# Patient Record
Sex: MALE | Race: WHITE | Employment: FULL TIME | URBAN - METROPOLITAN AREA
[De-identification: names, ages, dates, MRNs, and addresses within clinical notes are randomized per-mention and may not be internally consistent; named-entity substitution may affect disease eponyms.]

---

## 2017-01-10 ENCOUNTER — OFFICE VISIT (OUTPATIENT)
Dept: CARDIOLOGY CLINIC | Age: 57
End: 2017-01-10

## 2017-01-10 VITALS
BODY MASS INDEX: 34.27 KG/M2 | DIASTOLIC BLOOD PRESSURE: 70 MMHG | HEIGHT: 72 IN | HEART RATE: 79 BPM | WEIGHT: 253 LBS | SYSTOLIC BLOOD PRESSURE: 134 MMHG | OXYGEN SATURATION: 98 %

## 2017-01-10 DIAGNOSIS — R07.89 OTHER CHEST PAIN: Primary | ICD-10-CM

## 2017-01-10 NOTE — PROGRESS NOTES
1. Have you been to the ER, urgent care clinic since your last visit? Hospitalized since your last visit? No    2. Have you seen or consulted any other health care providers outside of the [address] [place] [address] since your last visit? Include any pap smears or colon screening.  No

## 2017-01-10 NOTE — PROGRESS NOTES
Mr. Ariana Fink is a pleasant 60year old male with history of coronary artery disease, status post LAD and OM branch stent in April, 2016, aortic aneurysm, mitral regurgitation, dyslipidemia and hypertension. Mr. Ariana Fink is here today to establish care with me. He used to be a patient of Dr. Vega Flores. Mr. Ariana Fink has a known history of coronary artery disease, mitral regurgitation, aortic aneurysm as mentioned above. He said that he is doing quite well since last time. He continues to have occasional dyspnea on exertion, which has been stable over the last couple years. There is no change in his cardiac status since last visit, however he said that occasionally he continues to feel chest tightness, lasting sometimes for a few seconds, sometimes a few minutes and sometimes even for one or two days. He has been having this discomfort for a couple years. There is no associated nausea or shortness of breath. There is no associated presyncope or syncope. He takes his medications regularly. Past Medical History   Diagnosis Date    Aortic aneurysm      annulus 34 x 16mm,  SOV 39 x 39mm,  STJ 35 x 38mm,  MID ASC 39 x 38mm,  MAX DIAMETER (at level of RPA) 41 x 43mm,  PROX ARCH 37 x 37mm,  DIST ARCH 34 x 32mm,  DESC AO 28 x 28mm   (CTA 12/15)    Coronary artery disease      LAD - 4.0 x 18mm XIENCE 4/16, OM1 - 4.0 x 15mm XIENCE 4/16     Dyslipidemia     Hypertension     Mitral regurgitation      moderate (TTE 10/15),  moderate (ANITA 4/16)    Pulmonary fibrosis      upper lobes bilat (CTA 12/16)    Remote tobacco use      quit 2012        Past Surgical History   Procedure Laterality Date    Hx orthopaedic       prior (L) knee surgery    Hx cervical fusion       cervical fusion       Current Outpatient Prescriptions   Medication Sig    clopidogrel (PLAVIX) 75 mg tab take 1 tablet by mouth once daily    albuterol (PROVENTIL HFA, VENTOLIN HFA, PROAIR HFA) 90 mcg/actuation inhaler Take  by inhalation.     ergocalciferol (ERGOCALCIFEROL) 50,000 unit capsule take 1 capsule by mouth every week    losartan (COZAAR) 100 mg tablet Take 1 Tab by mouth daily.  cholecalciferol, vitamin D3, 2,000 unit tab Take  by mouth.  ezetimibe (ZETIA) 10 mg tablet Take 1 Tab by mouth daily.  metoprolol succinate (TOPROL XL) 25 mg XL tablet Take 1 Tab by mouth daily.  atorvastatin (LIPITOR) 80 mg tablet Take 1 Tab by mouth daily.  nitroglycerin (NITROSTAT) 0.4 mg SL tablet 1 Tab by SubLINGual route every five (5) minutes as needed for Chest Pain.  ASPIRIN (ASPIR-81 PO) Take 81 mg by mouth daily.  omega-3 fatty acids-vitamin e (FISH OIL) 1,000 mg cap Take 4,000 mg by mouth daily. No current facility-administered medications for this visit. Allergies and Intolerances: Allergies   Allergen Reactions    Brilinta [Ticagrelor] Other (comments)     dyspnea    Sildenafil Other (comments)     Flushing    Vardenafil Other (comments)     Flushing          Family History:    Heart Disease Father        Social History:   He  reports that he quit smoking about 3 years ago. His smoking use included Cigarettes. He has a 40.00 pack-year smoking history. He has never used smokeless tobacco.  He  reports that he drinks alcohol. Review of Systems:   As above otherwise 11 point review of systems negative including constitutional, skin, HENT, eyes, respiratory, cardiovascular, gastrointestinal, genitourinary, musculoskeletal, endo/heme/aller, neurological.      Physical:   Vitals:   BP: 134/70  HR: 79  Wt: 253 lb (114.8 kg)    Exam:   General:          Middle aged  gentleman, no complaints, no distress.     Head/Neck:     No JVD                           No bruits. Lungs:             VH respiratory distress.                             Clear with good effort. Heart:              Regular.  II/VI systolic murmur. No diastolic component. Abdomen:       Soft.  Bowel sounds present  Extremities:     Intact pulses.                             No edema.     Neurological:   Alert and oriented to person, place, time.                             No gross neurological deficit. Skin:  Warm and dry. Review of Data:    LABS:   Lab Results   Component Value Date/Time    WBC 6.6 04/30/2016 04:20 AM    HGB 14.7 04/30/2016 04:20 AM    HCT 41.9 04/30/2016 04:20 AM    PLATELET 218 85/05/3074 04:20 AM    MCV 91.3 04/30/2016 04:20 AM     Lab Results   Component Value Date/Time    Sodium 140 10/13/2016 09:30 AM    Potassium 4.3 10/13/2016 09:30 AM    Chloride 102 10/13/2016 09:30 AM    CO2 24 10/13/2016 09:30 AM    Anion gap 14.0 10/13/2016 09:30 AM    Glucose 102 10/13/2016 09:30 AM    BUN 18 10/13/2016 09:30 AM    Creatinine 0.7 10/13/2016 09:30 AM    BUN/Creatinine ratio 19 04/30/2016 04:20 AM    GFR est AA >60 04/30/2016 04:20 AM    GFR est non-AA >60 04/30/2016 04:20 AM    Calcium 9.1 10/13/2016 09:30 AM    Bilirubin, total 0.6 04/26/2016 08:53 AM    ALT 37 10/13/2016 09:30 AM    AST 24 10/13/2016 09:30 AM    Alk. phosphatase 86 04/26/2016 08:53 AM    Protein, total 7.4 04/26/2016 08:53 AM    Albumin 3.8 04/26/2016 08:53 AM    Globulin 3.6 04/26/2016 08:53 AM    A-G Ratio 1.1 04/26/2016 08:53 AM     Lab Results   Component Value Date/Time    Cholesterol, total 129 10/13/2016 09:30 AM    HDL Cholesterol 39 10/13/2016 09:30 AM    LDL, calculated 75 10/13/2016 09:30 AM    VLDL, calculated 15 10/13/2016 09:30 AM    Triglyceride 77 10/13/2016 09:30 AM     Lab Results   Component Value Date/Time    Hemoglobin A1c 5.4 08/18/2015 09:38 AM     EKG:  April 2016:  Sinus rhythm, 54 beats per minute. NSTT. TRANSTHORACIC ECHOCARDIOGRAM: October 2015:  Left Ventricle: Mildly dilated left ventricle. Mild left ventricular hypertrophy. The left ventricular systolic function is normal.  EF 55%. No segmental wall motion abnormalities. Diastolic dysfunction.        Left Atrium: The left atrium was mildly dilated. The LA Volume Indexed was 35mL/m2. The estimated left atrial filling pressure was 13mmHg.        Right Ventricle: The right ventricle was normal in size and function. Tricuspid Annular Plane Systolic Excursion (TAPSE) is 2.0cm. Tricuspid Annular Plane Systolic Velocity (TAPSV) is 15cm/s.        Right Atrium: The right atrium was normal in size. The right atrial area was 18cm2        Mitral Valve: Mild thickening/calcification of the mitral valve leaflets with prolapse of the posterior leaflet. Moderate mitral regurgitation.        Aortic Valve: The aortic valve leaflets were normal without regurgitation or stenosis.       Tricuspid Valve: The tricuspid valve was structurally normal without regurgitation.  Unable to estimate pulmonary artery pressure due to the absence of a regurgitant jet.        Pulmonic Valve: The pulmonic valve was not well visualized.        Aorta: Dilated aortic root (4.4cm). Proximal ascending aorta measured 4.0cm. Mobile structure in the ascending aorta likely artifact.        Pericardium: The pericardium was normal.        Masses / Shunts: No masses, shunts or thrombi seen. TRANSESOPHAGEAL ECHOCARDIOGRAM: April 2016:   LEFT VENTRICLE: Size was normal. Systolic function was normal.    RIGHT VENTRICLE: The size was normal. Systolic function was normal.    LEFT ATRIUM: Size was normal. No thrombus was identified. There was no  spontaneous echo contrast (\"smoke\"). APPENDAGE: The size was normal. No  thrombus was identified. There was no spontaneous echo contrast (\"smoke\")  in the appendage. DOPPLER: The function was normal (normal emptying  velocity). ATRIAL SEPTUM: There was no evidence of intracardiac shunt by  peripherally-administered agitated saline contrast.    RIGHT ATRIUM: Size was normal.    MITRAL VALVE: There was minimal thickening. There was minimal  calcification. There appeared to be a partial flail of the P2 segment of  the posterior leaflet of the mitral valve. Torn chordae were not  visualized.  The papillary muscle appeared to be intact. There was moderate  eccentric mitral regurgitation along the atrial aspect of the anterior  leaflet and along the atrial septum. There was no evidence for pulmonary  vein inflow reversal.    AORTIC VALVE: The valve was trileaflet. Leaflets exhibited normal  thickness and normal cuspal separation. No obvious mass, vegetation or  thrombus noted. DOPPLER: There was no stenosis. There was no regurgitation. TRICUSPID VALVE: Normal valve structure. There was normal leaflet  separation. No obvious mass, vegetation or thrombus noted. DOPPLER: There  was trivial regurgitation. PULMONIC VALVE: Leaflets exhibited normal thickness, no calcification, and  normal cuspal separation. No obvious mass, vegetation or thrombus noted. AORTA: The root exhibited normal size. There was no atheroma. There was  mild atheroma. There was no evidence for dissection. PERICARDIUM: There was no pericardial effusion. STRESS TEST (EST, PHARM, NUC, ECHO etc):     CTA CHEST w/wo: December 2015: (report personally reviewed)  Pleura:  No effusion or pleural disease. Heart:  Heart normal in size. No pericardial effusion. Trileaflet aortic valve. Severe coronary artery disease. There is severe narrowing of the proximal left circumflex coronary artery (series 10 image 304) due to soft and calcified plaque. There is severe narrowing of the proximal LAD due to soft plaque (36). There is at least moderate stenosis of the right coronary artery at the atrioventricular groove; evaluation mildly motion degraded. Lymph nodes: There are calcified lymph nodes in the mediastinum. No enlarged lymph nodes. Pulmonary vessels:  Normally enhancing. No filling defects. Pulmonary parenchyma: There is mild septal thickening in the upper lobes anteriorly. Subsegmental atelectasis at the left base and right middle lobe. Upper abdomen:  Liver overall low-attenuation. Calcified granulomas at the spleen. Remainder included hollow viscera are unremarkable. CT angiography:  Dilated ascending thoracic aorta, largest at the level of right pulmonary artery, then tapering prior to the aortic arch. Arch vessels demonstrate circumferential soft thrombus at proximal left subclavian without stenosis. Arch vessels patent. Measurements of the aorta are obtained orthogonal to direction of flow: Aortic annulus, 34 x 16 mm. Sinus of Valsalva, 39 x 39 mm. Sinotubular ridge, 35 x 38 mm. Mid ascending aorta, 39 x 38 mm. Maximal dimension at the level of the right pulmonary artery, 41 x 43 mm. Aorta proximal to first arch vessel, 37 x 37 mm. Distal aortic arch, 34 x 32 mm. Descending aorta, 28 x 28 mm. Aorta at the hiatus, 26 x 27 mm. Impression:  Dilated ascending thoracic aorta with maximal dimensions at the level of right pulmonary artery, 41 x 43 mm.    Severe multifocal coronary artery disease. Please see details above. Hepatic steatosis. Evidence of old healed granulomatous disease. Mild fibrosis in the upper lobes. CATHETERIZATION: March and April 2016: (images and report personally reviewed)  LM - normal  LAD - 80-90% ostial/prox (4.0 x 18mm XIENCE 4/16)  D1 - 70%  Cx - 50-60% prox  OM1 - 70-80% mid (4.0 x 15mm XIENCE 4/16)  RCA - plaquing  RPDA - normal  RPLV - normal  EF 50%    MR 2+    IMPRESSION AND PLAN:    Coronary artery disease:  Mr. Parish Ojeda had an LAD and OM branch stent in April of 2016. He continues to have occasional chest tightness with some mixed features. He does have documented 70% stenosis of first diagonal branch. After a lengthy discussion, nuclear stress test will be performed to see if he has any physiologic compromise of the coronary flow in that diagonal distribution. If so, diagonal stenting will be considered. For now he would stay on aspirin and Plavix. He is also on Toprol. Use of nitroglycerin was encouraged for anginal chest pain.     Aortic aneurysm:  Mr. Parish Ojeda had a cardiac CTA from December, 2015, which suggested descending aortic aneurysm. The measurements are noted above. Given his body habitus I did not believe that dimensions are markedly abnormal.  For now I would recommend better blood pressure control and serial CAT scan in about 1-2 years. Mitral regurgitation:  Mr. Freddy Sibley had an echocardiogram, as well as ANITA, which showed moderate mitral regurgitation, likely from prolapse of the posterior leaflet. Currently he does not have any fluid overload on exam.  He is on Cozaar and Toprol. He does not require any diuretics at this time. He does not have any objective events of atrial fibrillation. For now I would recommend to continue observation. HTN:   BP today is 134/70 mm Hg. Continue BB and ARB.

## 2017-01-10 NOTE — MR AVS SNAPSHOT
Visit Information Date & Time Provider Department Dept. Phone Encounter #  
 1/10/2017  1:30 PM Shorty Moore  [facility] Specialist at [facility] [phone] [id number] Your Appointments 3/17/2017  1:00 PM  
Follow Up with Taj Steinberg MD  
[address]) Appt Note: 6 month f/u  
 [address] [facility] [address] [facility] [address]  
[phone]  
  
   
 [address] Upcoming Health Maintenance Date Due Hepatitis C Screening 1960 FOBT Q 1 YEAR AGE 50-75 10/1/2008 DTaP/Tdap/Td series (2 - Td) 5/7/2020 Allergies as of 1/10/2017  Review Complete On: 1/10/2017 By: Mary Curry RN Severity Noted Reaction Type Reactions Brilinta [Ticagrelor]    Other (comments) dyspnea Sildenafil Low 08/10/2016    Other (comments) [place] Vardenafil Low 08/10/2016    Other (comments) [place] Current Immunizations  Reviewed on 10/21/2016 Name Date Influenza Vaccine Harsh Carias) 10/21/2016 Influenza Vaccine PF 9/30/2013 Pneumococcal Conjugate (PCV-13) 8/18/2015  9:25 AM  
 Pneumococcal Polysaccharide (PPSV-23) 2/10/2012 Pneumococcal Vaccine (Unspecified Type) 7/30/2013 Tdap 5/7/2010 Not reviewed this visit You Were Diagnosed With   
  
 Codes Comments Other chest pain    -  Primary ICD-10-CM: R07.89 ICD-9-CM: 786.59 Vitals BP Pulse Height(growth percentile) Weight(growth percentile) SpO2 BMI  
 134/70 79 6' (1.829 m) 253 lb (114.8 kg) 98% 34.31 kg/m2 Smoking Status Former Smoker BMI and BSA Data Body Mass Index Body Surface Area  
 34.31 kg/m 2 2.41 m 2 Preferred Pharmacy Pharmacy Name Phone [facility] [address] [phone] Your Updated Medication List  
  
   
 This list is accurate as of: 1/10/17  1:51 PM.  Always use your most recent med list.  
  
  
  
  
 albuterol 90 mcg/actuation inhaler Commonly known as:  PROVENTIL HFA, VENTOLIN HFA, PROAIR HFA Take  by inhalation. ASPIR-81 PO Take 81 mg by mouth daily. atorvastatin 80 mg tablet Commonly known as:  LIPITOR Take 1 Tab by mouth daily. cholecalciferol (vitamin D3) 2,000 unit Tab Take  by mouth. clopidogrel 75 mg Tab Commonly known as:  PLAVIX  
take 1 tablet by mouth once daily  
  
 ergocalciferol 50,000 unit capsule Commonly known as:  ERGOCALCIFEROL  
take 1 capsule by mouth every week  
  
 ezetimibe 10 mg tablet Commonly known as:  Raisa Fairfax Take 1 Tab by mouth daily. FISH OIL 1,000 mg Cap Generic drug:  omega-3 fatty acids-vitamin e Take 4,000 mg by mouth daily. losartan 100 mg tablet Commonly known as:  COZAAR Take 1 Tab by mouth daily. metoprolol succinate 25 mg XL tablet Commonly known as:  TOPROL XL Take 1 Tab by mouth daily. nitroglycerin 0.4 mg SL tablet Commonly known as:  NITROSTAT  
1 Tab by SubLINGual route every five (5) minutes as needed for Chest Pain. To-Do List   
 01/16/2017 Imaging:  NM CARDIAC SPECT W STRS/REST MULT   
  
 01/16/2017 ECG:  NUCLEAR STRESS TEST Introducing \[facility] [place] [facility]\"" & HEALTH SERVICES! Dear Thad Cruz: Thank you for requesting a [employer] account. Our records indicate that you already have an active [employer] account. You can access your account anytime at [email or web address] Did you know that you can access your hospital and ER discharge instructions at any time in [employer]? You can also review all of your test results from your hospital stay or ER visit. Additional Information If you have questions, please visit the Frequently Asked Questions section of the [employer] website at [email or web address] Remember, MyChart is NOT to be used for urgent needs. For medical emergencies, dial 911. Now available from your iPhone and Android! Please provide this summary of care documentation to your next provider. Your primary care clinician is listed as [facility]. If you have any questions after today's visit, please call [phone].

## 2017-01-17 ENCOUNTER — HOSPITAL ENCOUNTER (OUTPATIENT)
Dept: NON INVASIVE DIAGNOSTICS | Age: 57
Discharge: HOME | End: 2017-01-17
Attending: INTERNAL MEDICINE
Payer: COMMERCIAL

## 2017-01-17 ENCOUNTER — HOSPITAL ENCOUNTER (OUTPATIENT)
Dept: NUCLEAR MEDICINE | Age: 57
Discharge: HOME | End: 2017-01-17
Attending: INTERNAL MEDICINE
Payer: COMMERCIAL

## 2017-01-17 DIAGNOSIS — R07.89 OTHER CHEST PAIN: ICD-10-CM

## 2017-01-17 PROCEDURE — 78452 HT MUSCLE IMAGE SPECT MULT: CPT

## 2017-01-17 PROCEDURE — 93017 CV STRESS TEST TRACING ONLY: CPT

## 2017-01-20 ENCOUNTER — TELEPHONE (OUTPATIENT)
Dept: PULMONOLOGY | Facility: CLINIC | Age: 57
End: 2017-01-20

## 2017-01-31 ENCOUNTER — OFFICE VISIT (OUTPATIENT)
Dept: CARDIOLOGY CLINIC | Age: 57
End: 2017-01-31

## 2017-01-31 VITALS
SYSTOLIC BLOOD PRESSURE: 120 MMHG | HEIGHT: 72 IN | WEIGHT: 245 LBS | BODY MASS INDEX: 33.18 KG/M2 | HEART RATE: 88 BPM | OXYGEN SATURATION: 97 % | DIASTOLIC BLOOD PRESSURE: 65 MMHG

## 2017-01-31 DIAGNOSIS — I34.1 MITRAL PROLAPSE: ICD-10-CM

## 2017-01-31 DIAGNOSIS — E78.00 PURE HYPERCHOLESTEROLEMIA: ICD-10-CM

## 2017-01-31 DIAGNOSIS — I25.10 CORONARY ARTERY DISEASE DUE TO LIPID RICH PLAQUE: Primary | ICD-10-CM

## 2017-01-31 DIAGNOSIS — I10 ESSENTIAL HYPERTENSION WITH GOAL BLOOD PRESSURE LESS THAN 140/90: ICD-10-CM

## 2017-01-31 DIAGNOSIS — I25.83 CORONARY ARTERY DISEASE DUE TO LIPID RICH PLAQUE: Primary | ICD-10-CM

## 2017-01-31 NOTE — MR AVS SNAPSHOT
Visit Information Date & Time Provider Department Dept. Phone Encounter #  
 1/31/2017  2:15 PM Shorty Phillip  [facility] at [facility] [phone] [id number] Follow-up Instructions Return in about 9 months (around 10/31/2017). Your Appointments 2/8/2017  1:45 PM  
Follow Up with MD Leon Cordova [facility] at [facility]) Appt Note: 6 month f/u, [facility] [place] [place] [address]  
  
   
 [address] 3/17/2017  1:00 PM  
Follow Up with Mark Baptiste MD  
[address] [facility]) Appt Note: 6 month f/u  
 [address] [facility] [address] [facility] [address]  
[phone]  
  
   
 [address] [place] [address] [address] [facility] Upcoming Health Maintenance Date Due Hepatitis C Screening 1960 FOBT Q 1 YEAR AGE 50-75 10/1/2008 DTaP/Tdap/Td series (2 - Td) 5/7/2020 Allergies as of 1/31/2017  Review Complete On: 1/31/2017 By: Lloyd Mcclure RN Severity Noted Reaction Type Reactions Brilinta [Ticagrelor]    Other (comments) dyspnea Sildenafil Low 08/10/2016    Other (comments) [place] Vardenafil Low 08/10/2016    Other (comments) [place] Current Immunizations  Reviewed on 10/21/2016 Name Date Influenza Vaccine Sarah Marlon) 10/21/2016 Influenza Vaccine PF 9/30/2013 Pneumococcal Conjugate (PCV-13) 8/18/2015  9:25 AM  
 Pneumococcal Polysaccharide (PPSV-23) 2/10/2012 Pneumococcal Vaccine (Unspecified Type) 7/30/2013 Tdap 5/7/2010 Not reviewed this visit Vitals BP Pulse Height(growth percentile) Weight(growth percentile) SpO2 BMI  
 120/65 88 6' (1.829 m) 245 lb (111.1 kg) 97% 33.23 kg/m2 Smoking Status Former Smoker BMI and BSA Data Body Mass Index Body Surface Area 33.23 kg/m 2 2.38 m 2 Preferred Pharmacy Pharmacy Name Phone [facility] [address] [id number] [address] [phone] Your Updated Medication List  
  
   
This list is accurate as of: 1/31/17  2:29 PM.  Always use your most recent med list.  
  
  
  
  
 albuterol 90 mcg/actuation inhaler Commonly known as:  PROVENTIL HFA, VENTOLIN HFA, PROAIR HFA Take  by inhalation. ASPIR-81 PO Take 81 mg by mouth daily. atorvastatin 80 mg tablet Commonly known as:  LIPITOR Take 1 Tab by mouth daily. cholecalciferol (vitamin D3) 2,000 unit Tab Take  by mouth. clopidogrel 75 mg Tab Commonly known as:  PLAVIX  
take 1 tablet by mouth once daily  
  
 ergocalciferol 50,000 unit capsule Commonly known as:  ERGOCALCIFEROL  
take 1 capsule by mouth every week  
  
 ezetimibe 10 mg tablet Commonly known as:  Peyton Douglas Take 1 Tab by mouth daily. FISH OIL 1,000 mg Cap Generic drug:  omega-3 fatty acids-vitamin e Take 4,000 mg by mouth daily. losartan 100 mg tablet Commonly known as:  COZAAR Take 1 Tab by mouth daily. metoprolol succinate 25 mg XL tablet Commonly known as:  TOPROL XL Take 1 Tab by mouth daily. nitroglycerin 0.4 mg SL tablet Commonly known as:  NITROSTAT  
1 Tab by SubLINGual route every five (5) minutes as needed for Chest Pain. Follow-up Instructions Return in about 9 months (around 10/31/2017). Introducing [facility] & HEALTH SERVICES! Dear Jeanene Lennox: Thank you for requesting a [email or web address] account. Our records indicate that you already have an active [email or web address] account. You can access your account anytime at [email or web address] Did you know that you can access your hospital and ER discharge instructions at any time in [email or web address]? You can also review all of your test results from your hospital stay or ER visit. Additional Information If you have questions, please visit the Frequently Asked Questions section of the [email or web address] website at [email or web address] Remember, [place] is NOT to be used for urgent needs. For medical emergencies, dial 911. Now available from your iPhone and Android! Please provide this summary of care documentation to your next provider. Your primary care clinician is listed as [facility]. If you have any questions after today's visit, please call [phone].

## 2017-01-31 NOTE — PROGRESS NOTES
Mr. Freddy Sibley is a pleasant 60 y.o. male with history of coronary artery disease, status post LAD and OM branch stent in April, 2016, aortic aneurysm, mitral regurgitation, dyslipidemia and hypertension. Mr. Freddy Sibley is here today for follow up appointment. He denies any chest pain or chest tightness. He denies any specific new cardiac complaint. Overall he is relatively stable. He does have a chronic dyspnea on exertion, which has remained stable over a couple years. Past Medical History   Diagnosis Date    Aortic aneurysm      annulus 34 x 16mm,  SOV 39 x 39mm,  STJ 35 x 38mm,  MID ASC 39 x 38mm,  MAX DIAMETER (at level of RPA) 41 x 43mm,  PROX ARCH 37 x 37mm,  DIST ARCH 34 x 32mm,  DESC AO 28 x 28mm   (CTA 12/15)    Coronary artery disease      LAD - 4.0 x 18mm XIENCE 4/16, OM1 - 4.0 x 15mm XIENCE 4/16     Dyslipidemia     Hypertension     Mitral regurgitation      moderate (TTE 10/15),  moderate (ANITA 4/16)    Pulmonary fibrosis      upper lobes bilat (CTA 12/16)    Remote tobacco use      quit 2012        Past Surgical History   Procedure Laterality Date    Hx orthopaedic       prior (L) knee surgery    Hx cervical fusion       cervical fusion       Current Outpatient Prescriptions   Medication Sig    clopidogrel (PLAVIX) 75 mg tab take 1 tablet by mouth once daily    albuterol (PROVENTIL HFA, VENTOLIN HFA, PROAIR HFA) 90 mcg/actuation inhaler Take  by inhalation.  ergocalciferol (ERGOCALCIFEROL) 50,000 unit capsule take 1 capsule by mouth every week    losartan (COZAAR) 100 mg tablet Take 1 Tab by mouth daily.  cholecalciferol, vitamin D3, 2,000 unit tab Take  by mouth.  ezetimibe (ZETIA) 10 mg tablet Take 1 Tab by mouth daily.  metoprolol succinate (TOPROL XL) 25 mg XL tablet Take 1 Tab by mouth daily.  atorvastatin (LIPITOR) 80 mg tablet Take 1 Tab by mouth daily.     nitroglycerin (NITROSTAT) 0.4 mg SL tablet 1 Tab by SubLINGual route every five (5) minutes as needed for Chest Pain.    ASPIRIN (ASPIR-81 PO) Take 81 mg by mouth daily.  omega-3 fatty acids-vitamin e (FISH OIL) 1,000 mg cap Take 4,000 mg by mouth daily. No current facility-administered medications for this visit. Allergies and Intolerances: Allergies   Allergen Reactions    Brilinta [Ticagrelor] Other (comments)     dyspnea    Sildenafil Other (comments)     Flushing    Vardenafil Other (comments)     Flushing          Family History:    Heart Disease Father        Social History:   He  reports that he quit smoking about 3 years ago. His smoking use included Cigarettes. He has a 40.00 pack-year smoking history. He has never used smokeless tobacco.  He  reports that he drinks alcohol. Review of Systems:   As above otherwise 11 point review of systems negative including constitutional, skin, HENT, eyes, respiratory, cardiovascular, gastrointestinal, genitourinary, musculoskeletal, endo/heme/aller, neurological.      Physical:   Vitals:   BP: 120/65  HR: 88  Wt: 245 lb (111.1 kg)    Exam:   General:          Middle aged  gentleman, no complaints, no distress.     Head/Neck:     No JVD                           No bruits. Lungs:             DA respiratory distress.                             Clear with good effort. Heart:              Regular.  II/VI systolic murmur. No diastolic component. Abdomen:       Soft. Bowel sounds present  Extremities:     Intact pulses.                             NY edema.     Neurological:   Alert and oriented to person, place, time.                             No gross neurological deficit. Skin:  Warm and dry.     Review of Data:    LABS:   Lab Results   Component Value Date/Time    WBC 6.6 04/30/2016 04:20 AM    HGB 14.7 04/30/2016 04:20 AM    HCT 41.9 04/30/2016 04:20 AM    PLATELET 890 74/33/2653 04:20 AM    MCV 91.3 04/30/2016 04:20 AM     Lab Results   Component Value Date/Time    Sodium 140 10/13/2016 09:30 AM    Potassium 4.3 10/13/2016 09:30 AM    Chloride 102 10/13/2016 09:30 AM    CO2 24 10/13/2016 09:30 AM    Anion gap 14.0 10/13/2016 09:30 AM    Glucose 102 10/13/2016 09:30 AM    BUN 18 10/13/2016 09:30 AM    Creatinine 0.7 10/13/2016 09:30 AM    BUN/Creatinine ratio 19 04/30/2016 04:20 AM    GFR est AA >60 04/30/2016 04:20 AM    GFR est non-AA >60 04/30/2016 04:20 AM    Calcium 9.1 10/13/2016 09:30 AM    Bilirubin, total 0.6 04/26/2016 08:53 AM    ALT 37 10/13/2016 09:30 AM    AST 24 10/13/2016 09:30 AM    Alk. phosphatase 86 04/26/2016 08:53 AM    Protein, total 7.4 04/26/2016 08:53 AM    Albumin 3.8 04/26/2016 08:53 AM    Globulin 3.6 04/26/2016 08:53 AM    A-G Ratio 1.1 04/26/2016 08:53 AM     Lab Results   Component Value Date/Time    Cholesterol, total 129 10/13/2016 09:30 AM    HDL Cholesterol 39 10/13/2016 09:30 AM    LDL, calculated 75 10/13/2016 09:30 AM    VLDL, calculated 15 10/13/2016 09:30 AM    Triglyceride 77 10/13/2016 09:30 AM     Lab Results   Component Value Date/Time    Hemoglobin A1c 5.4 08/18/2015 09:38 AM     EKG:  April 2016:  Sinus rhythm, 54 beats per minute. NSTT. TRANSTHORACIC ECHOCARDIOGRAM: October 2015:  Left Ventricle: Mildly dilated left ventricle. Mild left ventricular hypertrophy. The left ventricular systolic function is normal.  EF 55%. No segmental wall motion abnormalities. Diastolic dysfunction.        Left Atrium: The left atrium was mildly dilated. The LA Volume Indexed was 35mL/m2. The estimated left atrial filling pressure was 13mmHg.        Right Ventricle: The right ventricle was normal in size and function. Tricuspid Annular Plane Systolic Excursion (TAPSE) is 2.0cm. Tricuspid Annular Plane Systolic Velocity (TAPSV) is 15cm/s.        Right Atrium: The right atrium was normal in size. The right atrial area was 18cm2        Mitral Valve: Mild thickening/calcification of the mitral valve leaflets with prolapse of the posterior leaflet.   Moderate mitral regurgitation.        Aortic Valve: The aortic valve leaflets were normal without regurgitation or stenosis.       Tricuspid Valve: The tricuspid valve was structurally normal without regurgitation.  Unable to estimate pulmonary artery pressure due to the absence of a regurgitant jet.        Pulmonic Valve: The pulmonic valve was not well visualized.        Aorta: Dilated aortic root (4.4cm). Proximal ascending aorta measured 4.0cm. Mobile structure in the ascending aorta likely artifact.        Pericardium: The pericardium was normal.        Masses / Shunts: No masses, shunts or thrombi seen. TRANSESOPHAGEAL ECHOCARDIOGRAM: April 2016:   LEFT VENTRICLE: Size was normal. Systolic function was normal.    RIGHT VENTRICLE: The size was normal. Systolic function was normal.    LEFT ATRIUM: Size was normal. No thrombus was identified. There was no  spontaneous echo contrast (\"smoke\"). APPENDAGE: The size was normal. No  thrombus was identified. There was no spontaneous echo contrast (\"smoke\")  in the appendage. DOPPLER: The function was normal (normal emptying  velocity). ATRIAL SEPTUM: There was no evidence of intracardiac shunt by  peripherally-administered agitated saline contrast.    RIGHT ATRIUM: Size was normal.    MITRAL VALVE: There was minimal thickening. There was minimal  calcification. There appeared to be a partial flail of the P2 segment of  the posterior leaflet of the mitral valve. Torn chordae were not  visualized. The papillary muscle appeared to be intact. There was moderate  eccentric mitral regurgitation along the atrial aspect of the anterior  leaflet and along the atrial septum. There was no evidence for pulmonary  vein inflow reversal.    AORTIC VALVE: The valve was trileaflet. Leaflets exhibited normal  thickness and normal cuspal separation. No obvious mass, vegetation or  thrombus noted. DOPPLER: There was no stenosis. There was no regurgitation. TRICUSPID VALVE: Normal valve structure. There was normal leaflet  separation.  No obvious mass, vegetation or thrombus noted. DOPPLER: There  was trivial regurgitation. PULMONIC VALVE: Leaflets exhibited normal thickness, no calcification, and  normal cuspal separation. No obvious mass, vegetation or thrombus noted. AORTA: The root exhibited normal size. There was no atheroma. There was  mild atheroma. There was no evidence for dissection. PERICARDIUM: There was no pericardial effusion. STRESS TEST (EST, PHARM, NUC, ECHO etc):     CTA CHEST w/wo: December 2015:  Pleura:  No effusion or pleural disease. Heart:  Heart normal in size. No pericardial effusion. Trileaflet aortic valve. Severe coronary artery disease. There is severe narrowing of the proximal left circumflex coronary artery (series 10 image 304) due to soft and calcified plaque. There is severe narrowing of the proximal LAD due to soft plaque (36). There is at least moderate stenosis of the right coronary artery at the atrioventricular groove; evaluation mildly motion degraded. Lymph nodes: There are calcified lymph nodes in the mediastinum. No enlarged lymph nodes. Pulmonary vessels:  Normally enhancing. No filling defects. Pulmonary parenchyma: There is mild septal thickening in the upper lobes anteriorly. Subsegmental atelectasis at the left base and right middle lobe. Upper abdomen:  Liver overall low-attenuation. Calcified granulomas at the spleen. Remainder included hollow viscera are unremarkable. CT angiography:  Dilated ascending thoracic aorta, largest at the level of right pulmonary artery, then tapering prior to the aortic arch. Arch vessels demonstrate circumferential soft thrombus at proximal left subclavian without stenosis. Arch vessels patent. Measurements of the aorta are obtained orthogonal to direction of flow: Aortic annulus, 34 x 16 mm. Sinus of Valsalva, 39 x 39 mm. Sinotubular ridge, 35 x 38 mm. Mid ascending aorta, 39 x 38 mm.  Maximal dimension at the level of the right pulmonary artery, 41 x 43 mm. Aorta proximal to first arch vessel, 37 x 37 mm. Distal aortic arch, 34 x 32 mm. Descending aorta, 28 x 28 mm. Aorta at the hiatus, 26 x 27 mm. Impression:  Dilated ascending thoracic aorta with maximal dimensions at the level of right pulmonary artery, 41 x 43 mm.    Severe multifocal coronary artery disease. Please see details above. Hepatic steatosis. Evidence of old healed granulomatous disease. Mild fibrosis in the upper lobes. CATHETERIZATION: March and April 2016: (images and report personally reviewed)  LM - normal  LAD - 80-90% ostial/prox (4.0 x 18mm XIENCE 4/16)  D1 - 70%  Cx - 50-60% prox  OM1 - 70-80% mid (4.0 x 15mm XIENCE 4/16)  RCA - plaquing  RPDA - normal  RPLV - normal  EF 50%    MR 2+    STRESS TEST (01/17)  Fair exercise tolerance. Normal blood pressure response to exercise. Myocardial perfusion imaging is normal  There is no area of prior scarring or ongoing ischemia. Overall left ventricle systolic function was severely reduced  without regional wall motion abnormalities (as noted above). Risk/extent of ischemia: Low risk. IMPRESSION AND PLAN:    Coronary artery disease:  Mr. Krunal Galloway had an LAD and OM branch stent in April of 2016. Stress test in 01/17 without on going ischemia. He does have documented 70% stenosis of first diagonal branch. For now he would stay on aspirin and Plavix. He is also on Toprol. S/L NTG as needed. Aortic aneurysm:  Mr. Krunal Galloway had a cardiac CTA from December, 2015, which suggested descending aortic aneurysm. The measurements are noted above. Given his body habitus I did not believe that dimensions are not markedly abnormal.  For now I would recommend better blood pressure control and serial CAT scan in about 1-2 years. Mitral regurgitation:  Mr. Krunal Galloway had an echocardiogram, as well as ANITA 2016, which showed moderate mitral regurgitation, likely from prolapse of the posterior leaflet.   Currently he does not have any fluid overload on exam.  He is on Cozaar and Toprol. He does not require any diuretics at this time. He does not have any objective events of atrial fibrillation. For now I would recommend to continue observation. HTN:   BP today is 120/65 mm Hg. Continue BB and ARB. Folllow up in 6-9 months.

## 2017-01-31 NOTE — PROGRESS NOTES
1. Have you been to the ER, urgent care clinic since your last visit? Hospitalized since your last visit? No    2. Have you seen or consulted any other health care providers outside of the [address] [place] [address] since your last visit? Include any pap smears or colon screening.  No

## 2017-02-08 ENCOUNTER — OFFICE VISIT (OUTPATIENT)
Dept: PULMONOLOGY | Facility: CLINIC | Age: 57
End: 2017-02-08

## 2017-02-08 VITALS
WEIGHT: 264 LBS | HEART RATE: 76 BPM | TEMPERATURE: 98.3 F | OXYGEN SATURATION: 97 % | HEIGHT: 73 IN | SYSTOLIC BLOOD PRESSURE: 142 MMHG | DIASTOLIC BLOOD PRESSURE: 86 MMHG | BODY MASS INDEX: 34.99 KG/M2

## 2017-02-08 DIAGNOSIS — J84.10 PULMONARY FIBROSIS (HCC): ICD-10-CM

## 2017-02-08 DIAGNOSIS — Z87.891 EX-SMOKER: ICD-10-CM

## 2017-02-08 DIAGNOSIS — R06.09 DOE (DYSPNEA ON EXERTION): Primary | ICD-10-CM

## 2017-02-08 NOTE — MR AVS SNAPSHOT
Visit Information Date & Time Provider Department Dept. Phone Encounter #  
 2/8/2017  1:45 PM Faith Crystal MD [facility] at [address] [facility] [id number] Your Appointments 3/17/2017  1:00 PM  
Follow Up with Ying Mckee MD  
[address]) Appt Note: 6 month f/u  
 [address] [facility] [id number] [phone]  
  
   
 [address]  [address] [facility] Upcoming Health Maintenance Date Due Hepatitis C Screening 1960 FOBT Q 1 YEAR AGE 50-75 10/1/2008 DTaP/Tdap/Td series (2 - Td) 5/7/2020 Allergies as of 2/8/2017  Review Complete On: 2/8/2017 By: Faith Crystal MD  
  
 Severity Noted Reaction Type Reactions Brilinta [Ticagrelor]    Other (comments) dyspnea Sildenafil Low 08/10/2016    Other (comments) [place] Vardenafil Low 08/10/2016    Other (comments) [place] Current Immunizations  Reviewed on 10/21/2016 Name Date Influenza Vaccine Manuelita [place]) 10/21/2016 Influenza Vaccine PF 9/30/2013 Pneumococcal Conjugate (PCV-13) 8/18/2015  9:25 AM  
 Pneumococcal Polysaccharide (PPSV-23) 2/10/2012 Pneumococcal Vaccine (Unspecified Type) 7/30/2013 Tdap 5/7/2010 Not reviewed this visit You Were Diagnosed With   
  
 Codes Comments SMITH (dyspnea on exertion)    -  Primary ICD-10-CM: R06.09 
ICD-9-CM: 786.09 Ex-smoker     ICD-10-CM: S25.184 ICD-9-CM: V15.82 Pulmonary fibrosis ([facility] [address]     ICD-10-CM: J84.10 ICD-9-CM: 538 Vitals BP Pulse Temp Height(growth percentile) Weight(growth percentile) SpO2  
 142/86 76 98.3 °F (36.8 °C) 6' 1\" (1.854 m) 264 lb (119.7 kg) 97% BMI Smoking Status 34.83 kg/m2 Former Smoker BMI and BSA Data Body Mass Index Body Surface Area 34.83 kg/m 2 2.48 m 2 Preferred Pharmacy Pharmacy Name Phone [facility] [id number] [address] [place], [place] [address] [phone] Your Updated Medication List  
  
   
This list is accurate as of: 2/8/17  1:50 PM.  Always use your most recent med list.  
  
  
  
  
 albuterol 90 mcg/actuation inhaler Commonly known as:  PROVENTIL HFA, VENTOLIN HFA, PROAIR HFA Take 2 Puffs by inhalation every four (4) hours as needed. ASPIR-81 PO Take 81 mg by mouth daily. atorvastatin 80 mg tablet Commonly known as:  LIPITOR Take 1 Tab by mouth daily. cholecalciferol (vitamin D3) 2,000 unit Tab Take  by mouth. clopidogrel 75 mg Tab Commonly known as:  PLAVIX  
take 1 tablet by mouth once daily  
  
 ergocalciferol 50,000 unit capsule Commonly known as:  ERGOCALCIFEROL  
take 1 capsule by mouth every week  
  
 ezetimibe 10 mg tablet Commonly known as:  Gerardine [place] Take 1 Tab by mouth daily. FISH OIL 1,000 mg Cap Generic drug:  omega-3 fatty acids-vitamin e Take 4,000 mg by mouth daily. losartan 100 mg tablet Commonly known as:  COZAAR Take 1 Tab by mouth daily. metoprolol succinate 25 mg XL tablet Commonly known as:  TOPROL XL Take 1 Tab by mouth daily. nitroglycerin 0.4 mg SL tablet Commonly known as:  NITROSTAT  
1 Tab by SubLINGual route every five (5) minutes as needed for Chest Pain. Introducing [facility] [place] [facility]! Dear Noah Edgar: Thank you for requesting a [email or web address] account. Our records indicate that you already have an active [email or web address] account. You can access your account anytime at [email or web address] Did you know that you can access your hospital and ER discharge instructions at any time in [email or web address]? You can also review all of your test results from your hospital stay or ER visit. Additional Information If you have questions, please visit the Frequently Asked Questions section of the [employer] website at [email or web address] Remember, [facility] is NOT to be used for urgent needs. For medical emergencies, dial 911. Now available from your iPhone and Android! Please provide this summary of care documentation to your next provider. Your primary care clinician is listed as Geoffrey Gurrola. If you have any questions after today's visit, please call [phone].

## 2017-02-08 NOTE — PROGRESS NOTES
1. Have you been to the ER, urgent care clinic since your last visit? Hospitalized since your last visit? No    2. Have you seen or consulted any other health care providers outside of the [address] [place] [address] since your last visit? Include any pap smears or colon screening.  No

## 2017-02-08 NOTE — PROGRESS NOTES
[address] [place] [address] [facility]  Pulmonary, Critical Care, and Sleep Medicine    Name: Ijeoma Mcneill MRN: [id number]   : 1960 Hospital:    Date: 2017        Pulmonary Follow Up                                              Consult requesting physician: Sulaiman Valadez MD, Dr. Delong  Reason for Consult: SMITH    History of present illness:  Mr. Deric Pizano is a 58-year-old male who was referred to me by Dr. Delong for dyspnea on exertion. Mr. Bethann Severs is a 58-year-old male with a past medical history significant for hypertension, dyslipidemia, coronary artery disease status post cardiac catheterization in 2016 and two stent placements, mitral regurgitation, ascending thoracic aortic aneurysm of 41 x 43 mm on CT scan in 2015 at [facility], cervical fusion with screws and cervical osteoarthritis, obesity, obesity, who is seen by me for dyspnea on exertion. Since stopping Brilliant, his dyspnea much improved. The patient is an ex-smoker of about 30 pack years who quit smoking in . He was never diagnosed with any pulmonary conditions. After starting the patient on Brilinta after cardiac catheterization and stent placement 2016, the patient thought he had increasing dyspnea in the hospital.  His Brilinta was changed to Plavix. The patient's dyspnea resolved after such change and it was thought that the patient may have allergic reaction to Brilinta. Patient came for follow up  Since last office visit he had nuclear stress test in 2017 which showed reduced LVEF to 28% from normal in 2016. I discussed with cardiologist Dr. Sol Pink and have ordered repeat echo per his request.     Over the period of the last few years the patient was noticed to have some chest tightness upon exertion, especially upon climbing steps. He could climb two flights of stairs without getting out of breath or some chest tightness.   He stated he can walk unlimited distance without any dyspnea but does get some dyspnea after load moving or strenuous activity. He denies any associated wheezing. He occasionally has a tickle in the throat and dry cough without any sputum production or hemoptysis. The patient gained about 20 pounds over the period of three years, but unchanged since last 1 year. No heartburn. The patient denies any fevers, chills, sputum production, hemoptysis, lower extremity edema or paroxysmal nocturnal dyspnea. He denies any nighttime pulmonary symptoms like chest tightness, cough, dyspnea or wheezing waking him up at night. He was never diagnosed with asthma. The patient denies any headache, diplopia, seizure, focal weakness, generalized weakness, gait imbalance, frequent falls, excessive urination, bone pain, joint pain, swelling or stiffness, appetite loss, night sweats, hemoptysis, dysphonia, dysphagia, lymph node enlargement or skin rash, nausea, vomiting, abdominal pain, diarrhea, constipation, dysuria. The patient had a CT angiogram of the chest at [facility] in 12/2015, which showed evidence of old healed granulomatous disease with mediastinal lymph node calcification, mild fibrosis in the upper lobes with mild septal thickening in upper lobes anteriorly, subsegmental atelectasis at the left base and right middle lobe. Upon detailed questioning, the patient never had lived or traveled in [address] [place] [address] or [place] or in [address] and was never exposed to [place]. He denies any previous severe lung infections. His pulmonary function test done today in the office showed mildly reduced FEV1 and FVC but no evidence of obstruction or restriction. Normal TLC, FRC is mildly reduced. His diffusion capacity was normal.      He also had bronchitis in 12/2016 treated with steroids and albuterol prn which he is not needing to use. Assessment and Plan:  1.  Dyspnea on exertion:   Likely multifactorial.  No evidence of obstructive or restrictive defect on pulmonary function test.  Extensive history of 30 pack year smoking. He quit smoking in 2012. Normal diffusion capacity on pulmonary function test.  The patient's dyspnea is much improved and back to his normal after stopping Brilinta. His other possibilities of dyspnea on exertion could be deconditioning (not doing any exercise or activity), possible diastolic heart dysfunction (likely due to mitral regurgitation, hypertension, coronary artery disease), possibly due to upper lobe fibrotic changes and maybe emphysema (I was not able to review the CT scan myself), now to neuromuscular causes (with history of cervical spine surgery). The patient is strongly advised and encouraged to ambulate and start daily exercise and walking. The patient stated he sometimes has allergic symptoms, especially in the spring and fall. The patient is advised to call us when he has allergy symptoms or worsening chest tightness or dyspnea. At that point I will repeat the pulmonary function test to see if there is any obstructive defect, if obstructive defect is found on the pulmonary function tests then I would consider using Albuterol prn or start any other bronchodilators. Nuclear stress test in 01/2017 which showed reduced LVEF to 28% from normal in 04/2016. I discussed with cardiologist Dr. Ion Castañeda and have ordered repeat echo. Patient to follow up with cardiologist.  2. Possible seasonal allergic rhinitis. When symptomatic I will consider starting a nasal steroid. 3. Intermittent GERD. If symptomatic GERD after lifestyle modifications then I will consider adding proton pump inhibitor. 4. Weight gain. The patient is advised to reduce weight with diet and exercise. 5. Upper lobe pulmonary fibrosis and healed calcified mediastinal lymphadenopathy, likely old granulomatous disease. I will observe clinically for now. 6. Ex-smoker who quit smoking in 2012 after 30 pack year smoking.   The patient is strongly advised and encouraged not to restart smoking. 7. Nuclear stress test with LVEF 28%, new compared to echo 04/2016 with normal LVEF. See above. Repeat echo ordered. · Smoking cessation counseling done by me. Strongly advised not to restart smoking. · Yearly influenza vaccine and pneumococcal vaccine as appropriate discussed with patient. · Plan of care discussed with patient. Discussed diagnosis, its evaluation, treatment and ususal course. Discussed medication dosage, use, side effects, and goals of treatment in detail. Warning signs of respiratory distress were reviewed with the patient. Discussed avoidance of precipitants. · HIM care and advance directive per PCP. · Prior/Old records reviewed and discussed with patient. · Labs, Images and available PFT and sleep study discussed with patient. Labs and images personally seen and available reports reviewed with patient. · All current medicines are reviewed and doses and prescription adjusted. · Care of plan discussed with nursing. · Further recommendations will be based on the ptient's response to recommended treatment and results of the investigation ordered. · Discussed with patient, radiologic work up showed, answered all questions to their satisfaction. · Follow-up: with cardiologist after echo. 12 months, sooner should new symptoms or problems arise. · Patient to follow up with pulmonary at [place] office with Dr. Joshua Gray or Dr. Nicolette Nunez        ILD History: b/l UL fibrotic changes noted on CT chest 12/2015  Hx of connective tissue disease such as Lupus, Scleroderma, RA: no  Hx of Sarcoidosis: no  Hx of meds such as methotrexate, amiodarone, nitrofurantoin: no  Hx of cancer, chemotherapy, radiation: no  Hx of birds, chickens, farm animals: no  Hx of molds, hot tubs: no  Hx of GERD: heartburn occasionally   Hx of aspiration: no      Review of Systems:  A comprehensive review of systems was negative.       Review of Systems:   HEENT: No epistaxis, no nasal drainage, no difficulty in swallowing, no redness in eyes  Respiratory: as above  Cardiovascular: no chest pain, no palpitations, no chronic leg edema, no syncope  Gastrointestinal: no abd pain, no vomiting, no diarrhea, no bleeding symptoms  Genitourinary: No urinary symptoms or hematuria  Integument/breast: No ulcers or rashes  Musculoskeletal:Neg  Neurological: No focal weakness, no seizures, no headaches  Behvioral/Psych: No anxiety, no depression  Constitutional: No fever, no chills, no weight loss, no night sweats       Immunization status:   Immunization History   Administered Date(s) Administered    Influenza Vaccine (Quad) 10/21/2016    Influenza Vaccine PF 09/30/2013    Pneumococcal Conjugate (PCV-13) 08/18/2015    Pneumococcal Polysaccharide (PPSV-23) 02/10/2012    Pneumococcal Vaccine (Unspecified Type) 07/30/2013    Tdap 05/07/2010      Influenza vaccine: yearly from his company, last one in 10/2015, reported by patient    PPD: yearly, last negative test in 10/2015 , reported by patient  TB personal history: no  TB exposure: no    Immumocompromised status: none  HIV: negative years ago , reported by patient  Chronic Systemic Steroids use: no  Other immunosuppressants use: no    Occupation: director plant services, 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 Exposure: Asbestos/berillium/mercury/silica/concrete: not sure.  But has worked with asbestos ducts, refrigerator work etc.     Travel history: no  Sick contact: no  Lives with: wife    Allergies   Allergen Reactions    Brilinta [Ticagrelor] Other (comments)     dyspnea    Sildenafil Other (comments)     Flushing    Vardenafil Other (comments)     Flushing      Seasonal Allergy: not sure, may be spring  Animal Allergy: no    Past Medical History   Diagnosis Date    Aortic aneurysm      annulus 34 x 16mm,  SOV 39 x 39mm,  STJ 35 x 38mm,  MID ASC 39 x 38mm,  MAX DIAMETER (at level of RPA) 41 x 43mm,  PROX ARCH 37 x 37mm,  DIST ARCH 34 x 32mm,  DESC AO 28 x 28mm   (CTA 12/15)    Coronary artery disease      LAD - 4.0 x 18mm XIENCE 4/16, OM1 - 4.0 x 15mm XIENCE 4/16     Dyslipidemia     Hypertension     Mitral regurgitation      moderate (TTE 10/15),  moderate (ANITA 4/16)    Pulmonary fibrosis      upper lobes bilat (CTA 12/16)    Remote tobacco use      quit 2012        Past Surgical History   Procedure Laterality Date    Hx orthopaedic       prior (L) knee surgery    Hx cervical fusion       cervical fusion        Family History   Problem Relation Age of Onset    Post-op Nausea/Vomiting Mother     Cancer Mother      lung cancer     Heart Disease Father     Headache Father     Hypertension Maternal Grandmother     Heart Disease Maternal Grandmother     Cancer Paternal Grandmother      throat cancer     Diabetes Neg Hx     Stroke Neg Hx       Hx of asthma/atopy/emphysema: uncle emphysema  Lung cancer: mother  ILD/Sarcoidosis: no  VTE: no  Pulmonary fibrosis: no  PAH: no  TB: no  CORTEZ: no    Social History   Substance Use Topics    Smoking status: Former Smoker     Packs/day: 1.00     Years: 40.00     Types: Cigarettes     Quit date: 8/10/2013    Smokeless tobacco: Never Used      Comment: 30 years smoking, stopped Oct. 2012    Alcohol use 0.0 oz/week     0 Standard drinks or equivalent per week      Comment: beer 5-18 on weekends      Illicit Drugs: no  Pets: Dogs/Cats/Birds/Reptiles/Other animals: no    Current Outpatient Prescriptions   Medication Sig    clopidogrel (PLAVIX) 75 mg tab take 1 tablet by mouth once daily    albuterol (PROVENTIL HFA, VENTOLIN HFA, PROAIR HFA) 90 mcg/actuation inhaler Take 2 Puffs by inhalation every four (4) hours as needed.  ergocalciferol (ERGOCALCIFEROL) 50,000 unit capsule take 1 capsule by mouth every week    losartan (COZAAR) 100 mg tablet Take 1 Tab by mouth daily.  cholecalciferol, vitamin D3, 2,000 unit tab Take  by mouth.  ezetimibe (ZETIA) 10 mg tablet Take 1 Tab by mouth daily.  metoprolol succinate (TOPROL XL) 25 mg XL tablet Take 1 Tab by mouth daily.  atorvastatin (LIPITOR) 80 mg tablet Take 1 Tab by mouth daily.  nitroglycerin (NITROSTAT) 0.4 mg SL tablet 1 Tab by SubLINGual route every five (5) minutes as needed for Chest Pain.  ASPIRIN (ASPIR-81 PO) Take 81 mg by mouth daily.  omega-3 fatty acids-vitamin e (FISH OIL) 1,000 mg cap Take 4,000 mg by mouth daily. No current facility-administered medications for this visit. Objective:   Vital Signs:    Visit Vitals    /86    Pulse 76    Temp 98.3 °F (36.8 °C)    Ht 6' 1\" (1.854 m)    Wt 119.7 kg (264 lb)    SpO2 97%    BMI 34.83 kg/m2           Physical Exam:     General/Neurology: Alert, Awake, NAD. Head:   Normocephalic, without obvious abnormality, atraumatic. Eye:   no scleral icterus, no pallor, no cyanosis  Nose:   No sinus tenderness, no erythema, no induration, no discharge  Throat:  Lips, mucosa, and tongue normal. Teeth normal. Gums normal. No tonsillar enlargement, no erythema, no exudates. No oral thrush. MP 2. Neck:   Supple, symmetric. Thyroid: no enlargement/tenderness/nodule. No carotid bruit. No lymphadenopathy. Trachea midline  Back & spine: Symmetric, no curvature. Chest wall: No tenderness or deformity. No rash  Lung: Moderate air entry bilateral equal. No stridors. No rales. No rhonchi. No wheezing. No prolonged expiration. No accessory muscle use. Heart:   Regular rate & rhythm. S1 S2 present. 2/6 systolic murmur. No gallop. No click. No rub. No JVD. Abdomen/: Soft, NT, ND, +BS, no masses, no organomegaly. Truncal Obesity +. Extremities:  No pedal edema. No cyanosis. No clubbing  Pulses: 2+ and symmetric in DP  Lymphatic:  No cervical, supraclavicular palpable lymphadenopathy. Musculoskeletal: No joint swelling or tenderness.   Skin:   Color, texture, turgor normal. No rashes or lesions        Data:       CBC w/Diff Lab Results   Component Value Date/Time WBC 6.6 04/30/2016 04:20 AM    RBC 4.59 04/30/2016 04:20 AM    HGB 14.7 04/30/2016 04:20 AM    HCT 41.9 04/30/2016 04:20 AM    PLATELET 371 76/52/4660 04:20 AM    NEUTROPHILS 39 04/26/2016 08:53 AM    LYMPHOCYTES 47 04/26/2016 08:53 AM    EOSINOPHILS 3 04/26/2016 08:53 AM        Chemistry Lab Results   Component Value Date/Time    Glucose 102 10/13/2016 09:30 AM    Sodium 140 10/13/2016 09:30 AM    Potassium 4.3 10/13/2016 09:30 AM    Chloride 102 10/13/2016 09:30 AM    CO2 24 10/13/2016 09:30 AM    BUN 18 10/13/2016 09:30 AM    Creatinine 0.7 10/13/2016 09:30 AM    Calcium 9.1 10/13/2016 09:30 AM    Anion gap 14.0 10/13/2016 09:30 AM    BUN/Creatinine ratio 19 04/30/2016 04:20 AM    Alk. phosphatase 86 04/26/2016 08:53 AM    Protein, total 7.4 04/26/2016 08:53 AM    Albumin 3.8 04/26/2016 08:53 AM    Globulin 3.6 04/26/2016 08:53 AM    A-G Ratio 1.1 04/26/2016 08:53 AM        Lactic Acid No results found for: LAC     Liver Enzymes Lab Results   Component Value Date/Time    Protein, total 7.4 04/26/2016 08:53 AM    Albumin 3.8 04/26/2016 08:53 AM    Globulin 3.6 04/26/2016 08:53 AM    A-G Ratio 1.1 04/26/2016 08:53 AM    AST (SGOT) 24 10/13/2016 09:30 AM    Alk.  phosphatase 86 04/26/2016 08:53 AM        Cardiac Enzymes No results found for: CPK, CKMMB, CKMB, RCK3, CKMBT, CKNDX, CKND1, MIGUELANGEL, TROPT, TROIQ, ERWIN, TROPT, TNIPOC, BNP, BNPP     BNP No results found for: BNP, BNPP, XBNPT     Coagulation Lab Results   Component Value Date/Time    Prothrombin time 13.9 04/26/2016 08:53 AM    INR 1.1 04/26/2016 08:53 AM    aPTT 25.2 06/25/2013 01:08 PM         Thyroid  Lab Results   Component Value Date/Time    TSH 1.44 08/18/2015 09:38 AM          Lipid Panel Lab Results   Component Value Date/Time    Cholesterol, total 129 10/13/2016 09:30 AM    HDL Cholesterol 39 10/13/2016 09:30 AM    LDL, calculated 75 10/13/2016 09:30 AM    VLDL, calculated 15 10/13/2016 09:30 AM    Triglyceride 77 10/13/2016 09:30 AM        ABG No results found for: PHI, PHI, PCO2I, PO2, PO2I, HCO3, HCO3I, FIO2, FIO2I     Urinalysis Lab Results   Component Value Date/Time    Color YELLOW 06/25/2013 01:08 PM    Appearance CLEAR 06/25/2013 01:08 PM    Specific gravity 1.020 06/25/2013 01:08 PM    pH (UA) 6.0 08/18/2015 09:38 AM    Protein NEGATIVE  06/25/2013 01:08 PM    Glucose NEGATIVE  06/25/2013 01:08 PM    Ketone Negative 08/18/2015 09:38 AM    Bilirubin Negative 08/18/2015 09:38 AM    Urobilinogen 0.2 06/25/2013 01:08 PM    Nitrites Negative 08/18/2015 09:38 AM    Leukocyte Esterase Negative 08/18/2015 09:38 AM    WBC 0-2 08/18/2015 09:38 AM    RBC 0-2 08/18/2015 09:38 AM        Micro  No results found for: SDES, CULT  No results found for: CULT     Ultrasound       LE Doppler       ECHO  4/5/16 Left ventricle: Size was normal. Systolic function was normal.    Right ventricle: The size was normal. Systolic function was normal.    Left atrium: Size was normal. No thrombus was identified. There was no  spontaneous echo contrast (\"smoke\"). Left atrial appendage: No thrombus was identified. There was no  spontaneous echo contrast (\"smoke\") in the appendage. Atrial septum: There was no evidence of intracardiac shunt by  peripherally-administered agitated saline contrast.    Right atrium: Size was normal.    Mitral valve: There appeared to be a partial flail of the P2 segment of  the posterior leaflet of the mitral valve. Torn chordae were not  visualized. The papillary muscle appeared to be intact. There was moderate  eccentric mitral regurgitation along the atrial aspect of the anterior  leaflet and along the atrial septum. There was no evidence for pulmonary  vein inflow reversal.    Aortic valve: The valve was trileaflet. Leaflets exhibited normal  thickness and normal cuspal separation. There was no stenosis. There was  no regurgitation. Tricuspid valve: There was trivial regurgitation. Aorta, systemic arteries: There was mild atheroma. Nuclear stress test 1/17/17:  Impression:  Fair exercise tolerance. Normal blood pressure response to exercise. Myocardial perfusion imaging is normal  There is no area of prior scarring or ongoing ischemia. Overall left ventricle systolic function was severely reduced  without regional wall motion abnormalities (as noted above). PFT 8/10/16 PULMONARY FUNCTION TEST:    Spirometry/Flows:  FET (Forced Expiratory Time) pre-bronchodilator 10.75 seconds and post-bronchodilator 13.52 seconds. FEV1 (Forced Expiratory Volume in one second) is reduced (3.26 L or 79% predicted) before bronchodilator, diminished after bronchodilator (3.23 L or 79% predicted), with -1% change. FVC (Forced Vital Capacity) is reduced (4.23 L or 79% predicted) before bronchodilator, improved after bronchodilator (4.44 L or 82% predicted), with 5% change. FEV1/FVC ratio (FEV 1%) is normal 77%. Maximal Mid Expiratory Flow rate is normal (2.77 L or 81% predicted). Bronchodilator:  No significant improvement with bronchodilator therapy    Flow Volume Loop:  Reduced Terminal Flows in the Flow Volume Loop    Volumes:  Reduced FRC at 77% and reduced ERV at 30%  Total Lung Capacity is  normal (6.76 L or 86% predicted). Vital Capacity is reduced (4.24 L or 79% predicted). Diffusion:  Diffusion Capacity normal (28.08 mL/min/mm Hg or 82 % predicted). IMPRESSION:  Reduced FEV1 and FVC but no definitive eveidence of airway obstruction or restrictive disease. This pattern frequently reflects failure of the patient to inhale or exhale completely during spirometry. Post-bronchodilator testing failed to demonstrate a significant change in FVC or FEV1, nevertheless, a clinical benefit can occur in the absence of a response to bronchodilator in the laboratory. Total lung capacity is within normal limits. Reduced ERV suggests obesity. The diffusing capacity for carbon monoxide is  normal.      See technicians comments.   Please see scanned PFT raw data in patient's chart. Thomas Oquendo MD  8/10/2016      Bronchoscopy       Pathology       PET Scan       CT (Most Recent) reviewed by me independently No results found for this or any previous visit. 12/21/15 at [facility]:  Impression:    Dilated ascending thoracic aorta with maximal dimensions at the level of right pulmonary artery, 41 x 43 mm. Severe multifocal coronary artery disease. Please see details above. Hepatic steatosis. Evidence of old healed granulomatous disease. Mild fibrosis in the upper lobes. Result Narrative   CT angiogram of the chest    CPT code number: 46708    History: Patient with dilated aortic root. Technique: Multidetector gated helical CT angiography was carried out from the thoracic inlet to the upper abdomen following intravenous contrast administration (90cc Omnipaque 350). Thin section image collimation was utilized. Sagittal and coronal reformations were then created with the original data set. Using a separate [address] [place] [address] Road post processed images, including surface shaded displays, were produced for this exam, permanently archived, and interpreted. Parenchymal organ imaging will be limited by arterial phase of contrast administration. i    Comparison: None    CT scan thorax:    Pleura: No effusion or pleural disease. Heart: Heart normal in size. No pericardial effusion. Trileaflet aortic valve. Severe coronary artery disease. There is severe narrowing of the proximal left circumflex coronary artery (series 10 image 304) due to soft and calcified plaque. There is severe narrowing of the proximal LAD due to soft plaque (36). There is at least moderate stenosis of the right coronary artery at the atrioventricular groove; evaluation mildly motion degraded. Lymph nodes: There are calcified lymph nodes in the mediastinum. No enlarged lymph nodes. Pulmonary vessels: Normally enhancing.  No filling defects. Pulmonary parenchyma: There is mild septal thickening in the upper lobes anteriorly. Subsegmental atelectasis at the left base and right middle lobe. Upper abdomen: Liver overall low-attenuation. Calcified granulomas at the spleen. Remainder included hollow viscera are unremarkable. CT angiography: Dilated ascending thoracic aorta, largest at the level of right pulmonary artery, then tapering prior to the aortic arch. Arch vessels demonstrate circumferential soft thrombus at proximal left subclavian without stenosis. Arch vessels patent. Measurements of the aorta are obtained orthogonal to direction of flow: Aortic annulus, 34 x 16 mm. Sinus of Valsalva, 39 x 39 mm. Sinotubular ridge, 35 x 38 mm. Mid ascending aorta, 39 x 38 mm. Maximal dimension at the level of the right pulmonary artery, 41 x 43 mm. Aorta proximal to first arch vessel, 37 x 37 mm. Distal aortic arch, 34 x 32 mm. Descending aorta, 28 x 28 mm. Aorta at the hiatus, 26 x 27 mm. XR (Most Recent). CXR reviewed by me and compared with previous CXR   Results from [place] encounter on 01/24/14   XR SPINE CERV PA LAT ODONT 3 V MAX   Narrative Indication:  Postop fusion    Comparison:  10/03/13    Findings:  AP, lateral and odontoid: There is a reversed cervical lordosis  present. There is a fixation plate with screws between C4 and C5. A disc  spacers evident at the same level. Osseous fusion is seen between C5 and C6. Spondylosis with disc osteophytes are seen between C6-7 and C3-4. There is no  suspicion for fracture based on these views. The spine appears stable         Impression IMPRESSION:    Stable fusion changes                 Imaging:   [x] I have personally reviewed the patients radiographs  [] Radiographs reviewed with radiologist  [] No CXR study available for review today  [] No change from prior  [] Improved   [] Worsening      [x]See my orders for details    Please note:  This document has been produced using voice recognition software. Unrecognized errors in transcription may be present.     Ema Fabian MD  2/8/2017

## 2017-02-23 ENCOUNTER — DOCUMENTATION ONLY (OUTPATIENT)
Dept: PULMONOLOGY | Age: 57
End: 2017-02-23

## 2017-02-23 ENCOUNTER — HOSPITAL ENCOUNTER (OUTPATIENT)
Dept: NON INVASIVE DIAGNOSTICS | Age: 57
Discharge: HOME | End: 2017-02-23
Attending: INTERNAL MEDICINE
Payer: COMMERCIAL

## 2017-02-23 DIAGNOSIS — R06.09 DOE (DYSPNEA ON EXERTION): ICD-10-CM

## 2017-02-23 DIAGNOSIS — J84.10 PULMONARY FIBROSIS (HCC): ICD-10-CM

## 2017-02-23 DIAGNOSIS — Z87.891 EX-SMOKER: ICD-10-CM

## 2017-02-23 PROCEDURE — 93306 TTE W/DOPPLER COMPLETE: CPT

## 2017-02-23 NOTE — PROGRESS NOTES
Repeat echo confirmed normal LVEF. Left ventricle: Systolic function was at the lower limits of normal.  Ejection fraction was estimated in the range of 50 % to 55 %. There was  moderate hypokinesis of the mid anteroseptal wall(s). Wall thickness was  mildly increased. There was mild concentric hypertrophy. Mitral valve: There was a mild prolapse involving the posterior leaflet  with possible fenestration. There was moderate regurgitation.     Layne Call MD 2/23/2017 3:45 PM

## 2017-03-08 DIAGNOSIS — E55.9 VITAMIN D DEFICIENCY: ICD-10-CM

## 2017-03-08 DIAGNOSIS — E78.2 MIXED HYPERLIPIDEMIA: ICD-10-CM

## 2017-03-08 DIAGNOSIS — I10 ESSENTIAL HYPERTENSION: ICD-10-CM

## 2017-03-09 LAB
25(OH)D3 SERPL-MCNC: 28.3 NG/ML (ref 32–100)
A-G RATIO,AGRAT: 1.4 RATIO (ref 1.1–2.6)
ABSOLUTE LYMPHOCYTE COUNT, 10803: 2.6 K/UL (ref 1–4.8)
ALBUMIN SERPL-MCNC: 4.3 G/DL (ref 3.5–5)
ALP SERPL-CCNC: 77 U/L (ref 25–115)
ALT SERPL-CCNC: 35 U/L (ref 5–40)
ANION GAP SERPL CALC-SCNC: 17 MMOL/L
AST SERPL W P-5'-P-CCNC: 24 U/L (ref 10–37)
AVG GLU, 10930: 119 MG/DL (ref 91–123)
BASOPHILS # BLD: 0.1 K/UL (ref 0–0.2)
BASOPHILS NFR BLD: 1 % (ref 0–2)
BILIRUB SERPL-MCNC: 0.5 MG/DL (ref 0.2–1.2)
BILIRUB UR QL: NEGATIVE
BUN SERPL-MCNC: 14 MG/DL (ref 6–22)
CALCIUM SERPL-MCNC: 9 MG/DL (ref 8.4–10.4)
CHLORIDE SERPL-SCNC: 100 MMOL/L (ref 98–110)
CHOLEST SERPL-MCNC: 144 MG/DL (ref 110–200)
CO2 SERPL-SCNC: 23 MMOL/L (ref 20–32)
CREAT SERPL-MCNC: 0.7 MG/DL (ref 0.5–1.2)
EOSINOPHIL # BLD: 0.1 K/UL (ref 0–0.5)
EOSINOPHIL NFR BLD: 2 % (ref 0–6)
EPITHELIAL,EPSU: NORMAL /HPF (ref 0–2)
ERYTHROCYTE [DISTWIDTH] IN BLOOD BY AUTOMATED COUNT: 13.4 % (ref 10–16)
GFRAA, 66117: >60
GFRNA, 66118: >60
GLOBULIN,GLOB: 3.1 G/DL (ref 2–4)
GLUCOSE SERPL-MCNC: 116 MG/DL (ref 65–99)
GLUCOSE UR QL: NEGATIVE MG/DL
GRANULOCYTES,GRANS: 42 % (ref 40–75)
HBA1C MFR BLD HPLC: 5.8 % (ref 4.8–5.9)
HCT VFR BLD AUTO: 43.5 % (ref 39.3–51.6)
HDLC SERPL-MCNC: 46 MG/DL (ref 40–59)
HGB BLD-MCNC: 15 G/DL (ref 13.1–17.2)
HGB UR QL STRIP: NEGATIVE
KETONES UR QL STRIP.AUTO: NEGATIVE MG/DL
LDLC SERPL CALC-MCNC: 71 MG/DL (ref 50–99)
LEUKOCYTE ESTERASE: NEGATIVE
LYMPHOCYTES, LYMLT: 46 % (ref 27–45)
MCH RBC QN AUTO: 32 PG (ref 26–34)
MCHC RBC AUTO-ENTMCNC: 35 G/DL (ref 32–36)
MCV RBC AUTO: 93 FL (ref 80–95)
MONOCYTES # BLD: 0.5 K/UL (ref 0.1–0.9)
MONOCYTES NFR BLD: 9 % (ref 3–9)
NEUTROPHILS # BLD AUTO: 2.4 K/UL (ref 1.8–7.7)
NITRITE UR QL STRIP.AUTO: NEGATIVE
PH UR STRIP: 5 PH (ref 5–8)
PLATELET # BLD AUTO: 169 K/UL (ref 140–440)
PMV BLD AUTO: 9.8 FL (ref 6–10.8)
POTASSIUM SERPL-SCNC: 4.8 MMOL/L (ref 3.5–5.5)
PROT SERPL-MCNC: 7.4 G/DL (ref 6.4–8.3)
PROT UR QL STRIP: NEGATIVE MG/DL
RBC # BLD AUTO: 4.7 M/UL (ref 3.8–5.8)
RBC #/AREA URNS HPF: NORMAL /HPF
SODIUM SERPL-SCNC: 140 MMOL/L (ref 133–145)
SOURCE OF URINE, 17028: NORMAL
SP GR UR: 1.02 (ref 1–1.03)
TRIGL SERPL-MCNC: 133 MG/DL (ref 40–149)
TSH SERPL DL<=0.005 MIU/L-ACNC: 2.23 MCU/ML (ref 0.27–4.2)
UROBILINOGEN UR STRIP-MCNC: <2 MG/DL
VLDLC SERPL CALC-MCNC: 27 MG/DL (ref 8–30)
WBC # BLD AUTO: 5.7 K/UL (ref 4–11)
WBC URNS QL MICRO: NORMAL /HPF (ref 0–2)

## 2017-03-17 ENCOUNTER — OFFICE VISIT (OUTPATIENT)
Dept: FAMILY MEDICINE CLINIC | Age: 57
End: 2017-03-17

## 2017-03-17 VITALS
RESPIRATION RATE: 16 BRPM | HEIGHT: 73 IN | WEIGHT: 263.3 LBS | OXYGEN SATURATION: 95 % | SYSTOLIC BLOOD PRESSURE: 124 MMHG | BODY MASS INDEX: 34.9 KG/M2 | DIASTOLIC BLOOD PRESSURE: 64 MMHG | HEART RATE: 76 BPM | TEMPERATURE: 98 F

## 2017-03-17 DIAGNOSIS — Z11.59 NEED FOR HEPATITIS C SCREENING TEST: ICD-10-CM

## 2017-03-17 DIAGNOSIS — J84.10 PULMONARY FIBROSIS (HCC): ICD-10-CM

## 2017-03-17 DIAGNOSIS — E78.2 MIXED HYPERLIPIDEMIA: ICD-10-CM

## 2017-03-17 DIAGNOSIS — E55.9 VITAMIN D DEFICIENCY: ICD-10-CM

## 2017-03-17 DIAGNOSIS — I25.10 CORONARY ARTERY DISEASE INVOLVING NATIVE CORONARY ARTERY OF NATIVE HEART WITHOUT ANGINA PECTORIS: ICD-10-CM

## 2017-03-17 DIAGNOSIS — I71.20 THORACIC AORTIC ANEURYSM WITHOUT RUPTURE: ICD-10-CM

## 2017-03-17 DIAGNOSIS — I10 ESSENTIAL HYPERTENSION: Primary | ICD-10-CM

## 2017-03-17 NOTE — PROGRESS NOTES
Cherylene Dresser is a 60 y.o. male here for follow up      1. Have you been to the ER, urgent care clinic or hospitalized since your last visit? NO.     2. Have you seen or consulted any other health care providers outside of the [address] [place] [address] since your last visit (Include any pap smears or colon screening)? NO      Do you have an Advanced Directive? NO    Would you like information on Advanced Directives?  NO

## 2017-03-17 NOTE — PROGRESS NOTES
HISTORY OF PRESENT ILLNESS  Brandy Bradford is a 60 y.o. male. Hypertension    The history is provided by the patient and medical records. This is a chronic problem. Pertinent negatives include no chest pain, no palpitations, no blurred vision, no headaches, no dizziness, no shortness of breath, no nausea and no vomiting. Patient Active Problem List   Diagnosis Code    Essential hypertension I10    Mixed hyperlipidemia E78.2    Coronary artery disease I25.10    Mitral regurgitation I34.0    Aortic aneurysm I71.9    Pulmonary fibrosis J84.10    Vitamin D deficiency E55.9    Bilateral carpal tunnel syndrome G56.03    SMITH (dyspnea on exertion) R06.09    Ex-smoker Z87.891    Granulomatous lung disease J84.10       Current Outpatient Prescriptions:     clopidogrel (PLAVIX) 75 mg tab, take 1 tablet by mouth once daily, Disp: 30 Tab, Rfl: 6    albuterol (PROVENTIL HFA, VENTOLIN HFA, PROAIR HFA) 90 mcg/actuation inhaler, Take 2 Puffs by inhalation every four (4) hours as needed. , Disp: , Rfl:     ergocalciferol (ERGOCALCIFEROL) 50,000 unit capsule, take 1 capsule by mouth every week, Disp: 12 Cap, Rfl: 3    losartan (COZAAR) 100 mg tablet, Take 1 Tab by mouth daily. , Disp: 90 Tab, Rfl: 3    cholecalciferol, vitamin D3, 2,000 unit tab, Take  by mouth., Disp: , Rfl:     ezetimibe (ZETIA) 10 mg tablet, Take 1 Tab by mouth daily. , Disp: 90 Tab, Rfl: 3    metoprolol succinate (TOPROL XL) 25 mg XL tablet, Take 1 Tab by mouth daily. , Disp: 90 Tab, Rfl: 3    atorvastatin (LIPITOR) 80 mg tablet, Take 1 Tab by mouth daily. , Disp: 90 Tab, Rfl: 3    nitroglycerin (NITROSTAT) 0.4 mg SL tablet, 1 Tab by SubLINGual route every five (5) minutes as needed for Chest Pain., Disp: 25 Tab, Rfl: 3    ASPIRIN (ASPIR-81 PO), Take 81 mg by mouth daily. , Disp: , Rfl:     omega-3 fatty acids-vitamin e (FISH OIL) 1,000 mg cap, Take 4,000 mg by mouth daily. , Disp: , Rfl:       Review of Systems   Constitutional: Negative for chills, fever and weight loss. HENT: Negative for hearing loss. Eyes: Negative for blurred vision and double vision. Respiratory: Negative for cough, shortness of breath and wheezing. Cardiovascular: Negative for chest pain, palpitations and leg swelling. Gastrointestinal: Positive for heartburn (infrequently). Negative for abdominal pain, constipation, diarrhea, nausea and vomiting. Genitourinary: Negative for dysuria and urgency. Nocturia x 1-2   Musculoskeletal: Negative for joint pain and myalgias. Skin: Negative for itching and rash. Neurological: Positive for tingling (fingers,still tingling, not wearing splints). Negative for dizziness, sensory change, focal weakness and headaches. Endo/Heme/Allergies: Negative for environmental allergies. Psychiatric/Behavioral: Negative for depression. The patient is not nervous/anxious and does not have insomnia. Visit Vitals    /64 (BP 1 Location: Left arm, BP Patient Position: Sitting)    Pulse 76    Temp 98 °F (36.7 °C) (Oral)    Resp 16    Ht 6' 1\" (1.854 m)    Wt 263 lb 4.8 oz (119.4 kg)    SpO2 95%    BMI 34.74 kg/m2       Physical Exam   Constitutional: He is oriented to person, place, and time. He appears well-developed and well-nourished. HENT:   Head: Normocephalic. Right Ear: Tympanic membrane and ear canal normal.   Left Ear: Tympanic membrane and ear canal normal.   Mouth/Throat: Oropharynx is clear and moist.   Eyes: Conjunctivae and EOM are normal. Pupils are equal, round, and reactive to light. Neck: Neck supple. Cardiovascular: Normal rate, regular rhythm, normal heart sounds and intact distal pulses. Pulmonary/Chest: Effort normal and breath sounds normal.   Abdominal: Soft. Bowel sounds are normal. There is no tenderness. Musculoskeletal: He exhibits no edema. Neurological: He is alert and oriented to person, place, and time. He has normal reflexes. Skin: Skin is warm and dry. Psychiatric: He has a normal mood and affect. His behavior is normal.   Nursing note and vitals reviewed. Results for Jatin Meyers (MRN [id number]) as of 3/17/2017 13:05   Ref. Range 7/13/2016 08:45 10/13/2016 09:30 1/17/2017 10:30 2/23/2017 00:00 3/8/2017 09:06   WBC Latest Ref Range: 4.0 - 11.0 K/uL     5.7   RBC Latest Ref Range: 3.80 - 5.80 M/uL     4.70   HGB Latest Ref Range: 13.1 - 17.2 g/dL     15.0   HCT Latest Ref Range: 39.3 - 51.6 %     43.5   MCV Latest Ref Range: 80 - 95 fL     93   MCH Latest Ref Range: 26 - 34 pg     32   MCHC Latest Ref Range: 32 - 36 g/dL     35   RDW Latest Ref Range: 10.0 - 16.0 %     13.4   PLATELET Latest Ref Range: 140 - 440 K/uL     169   MPV Latest Ref Range: 6.0 - 10.8 fL     9.8   NEUTROPHILS Latest Ref Range: 40 - 75 %     42   Lymphocytes Latest Ref Range: 27 - 45 %     46 (H)   MONOCYTES Latest Ref Range: 3 - 9 %     9   EOSINOPHILS Latest Ref Range: 0 - 6 %     2   BASOPHILS Latest Ref Range: 0 - 2 %     1   ABS. NEUTROPHILS Latest Ref Range: 1.8 - 7.7 K/uL     2.4   ABS. MONOCYTES Latest Ref Range: 0.1 - 0.9 K/uL     0.5   ABS. EOSINOPHILS Latest Ref Range: 0.0 - 0.5 K/uL     0.1   ABS.  BASOPHILS Latest Ref Range: 0.0 - 0.2 K/uL     0.1   Specific Gravity Latest Ref Range: 1.005 - 1.03      1.018   pH (UA) Latest Ref Range: 5.0 - 8.0 pH     5.0   Protein Latest Ref Range: Negative, mg/dL     Negative   Glucose Latest Ref Range: Negative mg/dL     Negative   Ketone Latest Ref Range: Negative mg/dL     Negative   Blood Latest Ref Range: Negative      Negative   Bilirubin Latest Ref Range: Negative      Negative   Urobilinogen Latest Ref Range: <2.0 mg/dL     <2.0   Nitrites Latest Ref Range: Negative      Negative   Leukocyte Esterase Latest Ref Range: Negative      Negative   Epithelial cells Latest Ref Range: 0 - 2 /hpf     0-2   WBC Latest Ref Range: 0 - 2 /hpf     0-2   RBC Latest Ref Range: Negative, /hpf     0-2   Sodium Latest Ref Range: 133 - 145 mmol/L 140   140   Potassium Latest Ref Range: 3.5 - 5.5 mmol/L  4.3   4.8   Chloride Latest Ref Range: 98 - 110 mmol/L  102   100   CO2 Latest Ref Range: 20 - 32 mmol/L  24   23   Anion gap Latest Units: mmol/L  14.0   17.0   Glucose Latest Ref Range: 65 - 99 mg/dL  102 (H)   116 (H)   BUN Latest Ref Range: 6 - 22 mg/dL  18   14   Creatinine Latest Ref Range: 0.5 - 1.2 mg/dL  0.7   0.7   Calcium Latest Ref Range: 8.4 - 10.4 mg/dL  9.1   9.0   Bilirubin, total Latest Ref Range: 0.2 - 1.2 mg/dL     0.5   Protein, total Latest Ref Range: 6.4 - 8.3 g/dL     7.4   Albumin Latest Ref Range: 3.5 - 5.0 g/dL     4.3   Globulin Latest Ref Range: 2.0 - 4.0 g/dL     3.1   A-G Ratio Latest Ref Range: 1.1 - 2.6 ratio     1.4   ALT Latest Ref Range: 5 - 40 U/L 30 37   35   AST Latest Ref Range: 10 - 37 U/L 17 24   24   Alk. phosphatase Latest Ref Range: 25 - 115 U/L     77   Triglyceride Latest Ref Range: 40 - 149 mg/dL 158 (H) 77   133   Cholesterol, total Latest Ref Range: 110 - 200 mg/dL 162 129   144   HDL Cholesterol Latest Ref Range: 40 - 59 mg/dL 44 39 (L)   46   LDL, calculated Latest Ref Range: 50 - 99 mg/dL 87 75   71   VLDL, calculated Latest Ref Range: 8 - 30 mg/dL 32 (H) 15   27   Hemoglobin A1c, (calculated) Latest Ref Range: 4.8 - 5.9 %     5.8   TSH Latest Ref Range: 0.27 - 4.20 mcU/mL     2.23      VITAMIN D, 25-HYDROXY 28.3 (L) 32.0 - 100.0 ng/mL Final       ASSESSMENT and PLAN    ICD-10-CM ICD-9-CM    1. Essential hypertension I10 401.9    2. Mixed hyperlipidemia E78.2 272.2    3. Pulmonary fibrosis J84.10 515    4. Coronary artery disease involving native coronary artery of native heart without angina pectoris I25.10 414.01    5. Thoracic aortic aneurysm without rupture (HCC) I71.2 441.2    Continue current medications. Continue ergocalciferol (Vitamin D2) 50,000 units weekly and increase OTC Vitamin D3 to 3,000 units daily. avoid dietary starch and sugar and follow a program of regular aerobic exercise.   Return for follow up in 6 months, sooner with any problems. Return for fasting lab a few days before the appointment.

## 2017-03-17 NOTE — PATIENT INSTRUCTIONS
Continue current medications. Continue ergocalciferol (Vitamin D2) 50,000 units weekly and increase OTC Vitamin D3 to 3,000 units daily. avoid dietary starch and sugar and follow a program of regular aerobic exercise. Return for follow up in 6 months, sooner with any problems. Return for fasting lab a few days before the appointment.

## 2017-03-17 NOTE — MR AVS SNAPSHOT
Visit Information Date & Time Provider Department Dept. Phone Encounter #  
 3/17/2017  1:00 PM Cristina Zhanna, 3 [facility] [phone] [id number] Upcoming Health Maintenance Date Due Hepatitis C Screening 1960 FOBT Q 1 YEAR AGE 50-75 10/1/2008 DTaP/Tdap/Td series (2 - Td) 5/7/2020 Allergies as of 3/17/2017  Review Complete On: 3/17/2017 By: Cristina Chao MD  
  
 Severity Noted Reaction Type Reactions Brilinta [Ticagrelor]    Other (comments) dyspnea Sildenafil Low 08/10/2016    Other (comments) [place] Vardenafil Low 08/10/2016    Other (comments) [place] Current Immunizations  Reviewed on 10/21/2016 Name Date Influenza Vaccine Stella Guzman) 10/21/2016 Influenza Vaccine PF 9/30/2013 Pneumococcal Conjugate (PCV-13) 8/18/2015  9:25 AM  
 Pneumococcal Polysaccharide (PPSV-23) 2/10/2012 Pneumococcal Vaccine (Unspecified Type) 7/30/2013 Tdap 5/7/2010 Not reviewed this visit You Were Diagnosed With   
  
 Codes Comments Essential hypertension    -  Primary ICD-10-CM: I10 
ICD-9-CM: 401.9 Mixed hyperlipidemia     ICD-10-CM: E78.2 ICD-9-CM: 272.2 Pulmonary fibrosis ([address]     ICD-10-CM: J84.10 ICD-9-CM: 101 Coronary artery disease involving native coronary artery of native heart without angina pectoris     ICD-10-CM: I25.10 ICD-9-CM: 414.01 Thoracic aortic aneurysm without rupture (HCC)     ICD-10-CM: I71.2 ICD-9-CM: 441.2 Need for hepatitis C screening test     ICD-10-CM: Z11.59 
ICD-9-CM: V73.89 Vitals BP Pulse Temp Resp Height(growth percentile) Weight(growth percentile) 124/64 (BP 1 Location: Left arm, BP Patient Position: Sitting) 76 98 °F (36.7 °C) (Oral) 16 6' 1\" (1.854 m) 263 lb 4.8 oz (119.4 kg) SpO2 BMI Smoking Status 95% 34.74 kg/m2 Former Smoker BMI and BSA Data Body Mass Index Body Surface Area  34.74 kg/m 2 2.48 m 2  
  
  
 Preferred Pharmacy Pharmacy Name Phone [facility] Sabina [place]alta, [address] [place] [address] [address] [phone] Your Updated Medication List  
  
   
This list is accurate as of: 3/17/17  1:21 PM.  Always use your most recent med list.  
  
  
  
  
 albuterol 90 mcg/actuation inhaler Commonly known as:  PROVENTIL HFA, VENTOLIN HFA, PROAIR HFA Take 2 Puffs by inhalation every four (4) hours as needed. ASPIR-81 PO Take 81 mg by mouth daily. atorvastatin 80 mg tablet Commonly known as:  LIPITOR Take 1 Tab by mouth daily. cholecalciferol (vitamin D3) 2,000 unit Tab Take  by mouth. clopidogrel 75 mg Tab Commonly known as:  PLAVIX  
take 1 tablet by mouth once daily  
  
 ergocalciferol 50,000 unit capsule Commonly known as:  ERGOCALCIFEROL  
take 1 capsule by mouth every week  
  
 ezetimibe 10 mg tablet Commonly known as:  Suellyn Wayne Take 1 Tab by mouth daily. FISH OIL 1,000 mg Cap Generic drug:  omega-3 fatty acids-vitamin e Take 4,000 mg by mouth daily. losartan 100 mg tablet Commonly known as:  COZAAR Take 1 Tab by mouth daily. metoprolol succinate 25 mg XL tablet Commonly known as:  TOPROL XL Take 1 Tab by mouth daily. nitroglycerin 0.4 mg SL tablet Commonly known as:  NITROSTAT  
1 Tab by SubLINGual route every five (5) minutes as needed for Chest Pain. Patient Instructions Continue current medications. Continue ergocalciferol (Vitamin D2) 50,000 units weekly and increase OTC Vitamin D3 to 3,000 units daily. avoid dietary starch and sugar and follow a program of regular aerobic exercise. Return for follow up in 6 months, sooner with any problems. Return for fasting lab a few days before the appointment. Introducing [facility] & HEALTH SERVICES! Dear Romeo Nixon: Thank you for requesting a [employer] account.   Our records indicate that you already have an active Million-[employer]-1 account. You can access your account anytime at [email or web address] (powering [email or web address]). [email or web address] (powering InternMatch) Did you know that you can access your hospital and ER discharge instructions at any time in Million-2-1? You can also review all of your test results from your hospital stay or ER visit. Additional Information If you have questions, please visit the Frequently Asked Questions section of the Million-2-1 website at [email or web address] (powering [email or web address]). [email or web address]/[email or web address] Therapeuticst/. Remember, Million-2-1 is NOT to be used for urgent needs. For medical emergencies, dial 911. Now available from your iPhone and Android! Please provide this summary of care documentation to your next provider. Your primary care clinician is listed as Idris Macias. If you have any questions after today's visit, please call [phone].

## 2017-04-03 DIAGNOSIS — I10 ESSENTIAL HYPERTENSION: ICD-10-CM

## 2017-04-03 DIAGNOSIS — Z13.1 SCREENING FOR DIABETES MELLITUS: ICD-10-CM

## 2017-04-03 DIAGNOSIS — Z13.0 SCREENING, ANEMIA, DEFICIENCY, IRON: ICD-10-CM

## 2017-04-03 DIAGNOSIS — E78.2 MIXED HYPERLIPIDEMIA: ICD-10-CM

## 2017-04-07 DIAGNOSIS — E78.2 MIXED HYPERLIPIDEMIA: ICD-10-CM

## 2017-04-07 RX ORDER — ATORVASTATIN CALCIUM 80 MG/1
TABLET, FILM COATED ORAL
Qty: 90 TAB | Refills: 3 | Status: SHIPPED | OUTPATIENT
Start: 2017-04-07 | End: 2018-04-12 | Stop reason: SDUPTHER

## 2017-06-11 DIAGNOSIS — E78.5 DYSLIPIDEMIA: ICD-10-CM

## 2017-06-12 RX ORDER — EZETIMIBE 10 MG/1
10 TABLET ORAL DAILY
Qty: 90 TAB | Refills: 3 | Status: SHIPPED | OUTPATIENT
Start: 2017-06-12 | End: 2018-06-08 | Stop reason: SDUPTHER

## 2017-06-12 NOTE — TELEPHONE ENCOUNTER
From: Brittany Rodriguez  To: Linda Malloy MD  Sent: 6/11/2017 10:20 AM EDT  Subject: Medication Renewal Request    Original authorizing provider: MD Brittany Mayorga would like a refill of the following medications:  ezetimibe (ZETIA) 10 mg tablet Linda Malloy MD]    Preferred pharmacy: [id number]    Comment:

## 2017-06-20 DIAGNOSIS — E78.2 MIXED HYPERLIPIDEMIA: ICD-10-CM

## 2017-06-20 RX ORDER — METOPROLOL SUCCINATE 25 MG/1
TABLET, EXTENDED RELEASE ORAL
Qty: 90 TAB | Refills: 3 | Status: SHIPPED | OUTPATIENT
Start: 2017-06-20 | End: 2018-06-20 | Stop reason: SDUPTHER

## 2017-08-15 DIAGNOSIS — I10 ESSENTIAL HYPERTENSION: ICD-10-CM

## 2017-08-15 RX ORDER — LOSARTAN POTASSIUM 100 MG/1
TABLET ORAL
Qty: 90 TAB | Refills: 3 | Status: SHIPPED | OUTPATIENT
Start: 2017-08-15 | End: 2018-08-09 | Stop reason: SDUPTHER

## 2017-08-30 LAB
25(OH)D3 SERPL-MCNC: 44 NG/ML (ref 32–100)
ALT SERPL-CCNC: 31 U/L (ref 5–40)
ANION GAP SERPL CALC-SCNC: 17 MMOL/L
AST SERPL W P-5'-P-CCNC: 23 U/L (ref 10–37)
BUN SERPL-MCNC: 17 MG/DL (ref 6–22)
CALCIUM SERPL-MCNC: 8.7 MG/DL (ref 8.4–10.4)
CHLORIDE SERPL-SCNC: 96 MMOL/L (ref 98–110)
CHOLEST SERPL-MCNC: 121 MG/DL (ref 110–200)
CO2 SERPL-SCNC: 23 MMOL/L (ref 20–32)
CREAT SERPL-MCNC: 0.6 MG/DL (ref 0.5–1.2)
GFRAA, 66117: >60
GFRNA, 66118: >60
GLUCOSE SERPL-MCNC: 94 MG/DL (ref 65–99)
HCV AB SER IA-ACNC: NORMAL
HDLC SERPL-MCNC: 42 MG/DL (ref 40–59)
LDLC SERPL CALC-MCNC: 53 MG/DL (ref 50–99)
POTASSIUM SERPL-SCNC: 4.1 MMOL/L (ref 3.5–5.5)
SODIUM SERPL-SCNC: 136 MMOL/L (ref 133–145)
TRIGL SERPL-MCNC: 128 MG/DL (ref 40–149)
VLDLC SERPL CALC-MCNC: 26 MG/DL (ref 8–30)

## 2017-09-04 DIAGNOSIS — I10 ESSENTIAL HYPERTENSION: ICD-10-CM

## 2017-09-04 DIAGNOSIS — E78.2 MIXED HYPERLIPIDEMIA: ICD-10-CM

## 2017-09-04 DIAGNOSIS — E55.9 VITAMIN D DEFICIENCY: ICD-10-CM

## 2017-09-04 DIAGNOSIS — Z11.59 NEED FOR HEPATITIS C SCREENING TEST: ICD-10-CM

## 2017-09-15 ENCOUNTER — OFFICE VISIT (OUTPATIENT)
Dept: FAMILY MEDICINE CLINIC | Age: 57
End: 2017-09-15

## 2017-09-15 VITALS
OXYGEN SATURATION: 87 % | DIASTOLIC BLOOD PRESSURE: 80 MMHG | WEIGHT: 267.4 LBS | HEART RATE: 80 BPM | BODY MASS INDEX: 35.44 KG/M2 | RESPIRATION RATE: 18 BRPM | HEIGHT: 73 IN | TEMPERATURE: 99 F | SYSTOLIC BLOOD PRESSURE: 132 MMHG

## 2017-09-15 DIAGNOSIS — I25.10 CORONARY ARTERY DISEASE INVOLVING NATIVE CORONARY ARTERY OF NATIVE HEART WITHOUT ANGINA PECTORIS: ICD-10-CM

## 2017-09-15 DIAGNOSIS — I10 ESSENTIAL HYPERTENSION: Primary | ICD-10-CM

## 2017-09-15 DIAGNOSIS — E78.2 MIXED HYPERLIPIDEMIA: ICD-10-CM

## 2017-09-15 DIAGNOSIS — Z87.898 HISTORY OF PREDIABETES: ICD-10-CM

## 2017-09-15 DIAGNOSIS — J84.10 PULMONARY FIBROSIS (HCC): ICD-10-CM

## 2017-09-15 DIAGNOSIS — E66.9 OBESITY (BMI 30-39.9): ICD-10-CM

## 2017-09-15 LAB — HBA1C MFR BLD HPLC: 5.5 %

## 2017-09-15 NOTE — PROGRESS NOTES
HISTORY OF PRESENT ILLNESS  Awilda Gant is a 60 y.o. male. Skin Problem   Associated symptoms include chest pain (chronic angina) and shortness of breath (somewhat increased). Pertinent negatives include no abdominal pain. Hypertension    The history is provided by the patient and medical records. Associated symptoms include chest pain (chronic angina) and shortness of breath (somewhat increased). Pertinent negatives include no palpitations and no nausea. Review of Systems   Constitutional: Negative for fever and weight loss. Respiratory: Positive for shortness of breath (somewhat increased). Cardiovascular: Positive for chest pain (chronic angina). Negative for palpitations and leg swelling. Gastrointestinal: Negative for abdominal pain, heartburn and nausea. Skin: Positive for rash. Negative for itching. Left hand, left elbow x ~ 10 months, occurs after camping in the [facility] - this has resolved spontaneously in the past after a few months but this time has persisted x 10 months-no pruritis     Visit Vitals    /80 (BP 1 Location: Left arm, BP Patient Position: Sitting)    Pulse 80    Temp 99 °F (37.2 °C) (Oral)    Resp 18    Ht 6' 1\" (1.854 m)    Wt 267 lb 6.4 oz (121.3 kg)    SpO2 (!) 87%    BMI 35.28 kg/m2       Physical Exam   Constitutional: He is oriented to person, place, and time. He appears well-developed and well-nourished. Obese, weight has increased by 22 lbs since 1/2017   HENT:   Head: Normocephalic. Eyes: EOM are normal.   Neck: Neck supple. Cardiovascular: Normal rate, regular rhythm and normal heart sounds. Pulmonary/Chest: Effort normal and breath sounds normal.   Musculoskeletal: He exhibits no edema. Neurological: He is alert and oriented to person, place, and time. Skin: Skin is warm and dry.  Rash (two ~ 8 mm diameter cupped annular lesions left lateral elbow, non tender, +/- raised border- additional lesion dorsal left hand, has enlarged, red slightly raised border) noted. Psychiatric: He has a normal mood and affect. His behavior is normal.   Nursing note and vitals reviewed. Results for Debora Sahni (MRN [id number]) as of 9/15/2017 13:03   Ref. Range 10/13/2016 09:30 1/17/2017 10:30 2/23/2017 00:00 3/8/2017 09:06 8/29/2017 09:18   Sodium Latest Ref Range: 133 - 145 mmol/L 140   140 136   Potassium Latest Ref Range: 3.5 - 5.5 mmol/L 4.3   4.8 4.1   Chloride Latest Ref Range: 98 - 110 mmol/L 102   100 96 (L)   CO2 Latest Ref Range: 20 - 32 mmol/L 24   23 23   Anion gap Latest Units: mmol/L 14.0   17.0 17.0   Glucose Latest Ref Range: 65 - 99 mg/dL 102 (H)   116 (H) 94   BUN Latest Ref Range: 6 - 22 mg/dL 18   14 17   Creatinine Latest Ref Range: 0.5 - 1.2 mg/dL 0.7   0.7 0.6   Calcium Latest Ref Range: 8.4 - 10.4 mg/dL 9.1   9.0 8.7   Bilirubin, total Latest Ref Range: 0.2 - 1.2 mg/dL    0.5    Protein, total Latest Ref Range: 6.4 - 8.3 g/dL    7.4    Albumin Latest Ref Range: 3.5 - 5.0 g/dL    4.3    Globulin Latest Ref Range: 2.0 - 4.0 g/dL    3.1    A-G Ratio Latest Ref Range: 1.1 - 2.6 ratio    1.4    ALT (SGPT) Latest Ref Range: 5 - 40 U/L 37   35 31   AST Latest Ref Range: 10 - 37 U/L 24   24 23   Alk.  phosphatase Latest Ref Range: 25 - 115 U/L    77    Triglyceride Latest Ref Range: 40 - 149 mg/dL 77   133 128   Cholesterol, total Latest Ref Range: 110 - 200 mg/dL 129   144 121   HDL Cholesterol Latest Ref Range: 40 - 59 mg/dL 39 (L)   46 42   LDL, calculated Latest Ref Range: 50 - 99 mg/dL 75   71 53   VLDL, calculated Latest Ref Range: 8 - 30 mg/dL 15   27 26   Hemoglobin A1c, (calculated) Latest Ref Range: 4.8 - 5.9 %    5.8    TSH Latest Ref Range: 0.27 - 4.20 mcU/mL    2.23    HEPATITIS C AB Unknown     Rpt   VITAMIN D, 25-HYDROXY Latest Ref Range: 32.0 - 100.0 ng/mL 29.5 (L)   28.3 (L) 44.0   9/15/2017  1:18 PM - Antwon Luque LPN   Component Results   Component Value Flag Ref Range Units Status   Hemoglobin A1c (POC) 5.5   % Final       ASSESSMENT and PLAN    ICD-10-CM ICD-9-CM    1. Essential hypertension I10 401.9    2. Coronary artery disease involving native coronary artery of native heart without angina pectoris I25.10 414.01    3. Mixed hyperlipidemia E78.2 272.2    4. Pulmonary fibrosis J84.10 515    5. History of prediabetes Z87.898 V12.29 AMB POC HEMOGLOBIN A1C   6. Obesity (BMI 30-39. 9) E66.9 278.00    CAD with chronic angina appears to be stable-cardiology follow up next month. Hypertension, hyperlipidemia - at goal  Some increase in SMITH with Hx CAD and pulmonary fibrosis but also significant weight gain  Continue current medications. I have reviewed/discussed the above normal BMI with the patient. I have recommended the following interventions: monitor weight . Avoid dietary starch and sugar and follow a program of regular aerobic exercise. Return for annual physical and follow up in 6 months, sooner with any problems. Return for fasting lab including a PSA a few days before the appointment.

## 2017-09-15 NOTE — PATIENT INSTRUCTIONS
Continue current medications. Avoid dietary starch and sugar and follow a program of regular aerobic exercise. Return for annual physical and follow up in 6 months, sooner with any problems.

## 2017-09-15 NOTE — MR AVS SNAPSHOT
Visit Information Date & Time Provider Department Dept. Phone Encounter #  
 9/15/2017  1:00 PM Tonny Agosto, 3 [facility] [phone] [id number] Upcoming Health Maintenance Date Due FOBT Q 1 YEAR AGE 50-75 10/1/2008 INFLUENZA AGE 9 TO ADULT 8/1/2017 DTaP/Tdap/Td series (2 - Td) 5/7/2020 Allergies as of 9/15/2017  Review Complete On: 9/15/2017 By: Tonny Agosto MD  
  
 Severity Noted Reaction Type Reactions Brilinta [Ticagrelor]    Other (comments) dyspnea Sildenafil Low 08/10/2016    Other (comments) [place] Vardenafil Low 08/10/2016    Other (comments) [place] Current Immunizations  Reviewed on 10/21/2016 Name Date Influenza Vaccine Ophelia German) 10/21/2016 Influenza Vaccine PF 9/30/2013 Pneumococcal Conjugate (PCV-13) 8/18/2015  9:25 AM  
 Pneumococcal Polysaccharide (PPSV-23) 2/10/2012 Pneumococcal Vaccine (Unspecified Type) 7/30/2013 Tdap 5/7/2010 Not reviewed this visit You Were Diagnosed With   
  
 Codes Comments Essential hypertension    -  Primary ICD-10-CM: I10 
ICD-9-CM: 401.9 Coronary artery disease involving native coronary artery of native heart without angina pectoris     ICD-10-CM: I25.10 ICD-9-CM: 414.01 Mixed hyperlipidemia     ICD-10-CM: E78.2 ICD-9-CM: 272.2 [facility]     ICD-10-CM: J84.10 ICD-9-CM: 510 History of prediabetes     ICD-10-CM: Z87.898 ICD-9-CM: V12.29 Vitals BP Pulse Temp Resp Height(growth percentile) Weight(growth percentile) 132/80 (BP 1 Location: Left arm, BP Patient Position: Sitting) 80 99 °F (37.2 °C) (Oral) 18 6' 1\" (1.854 m) 267 lb 6.4 oz (121.3 kg) SpO2 BMI Smoking Status (!) 87% 35.28 kg/m2 Former Smoker Vitals History BMI and BSA Data Body Mass Index Body Surface Area  
 35.28 kg/m 2 2.5 m 2 Preferred Pharmacy Pharmacy Name Phone [facility] [address] [place] [address] [address] [phone] Your Updated Medication List  
  
   
This list is accurate as of: 9/15/17  1:24 PM.  Always use your most recent med list.  
  
  
  
  
 albuterol 90 mcg/actuation inhaler Commonly known as:  PROVENTIL HFA, VENTOLIN HFA, PROAIR HFA Take 2 Puffs by inhalation every four (4) hours as needed. ASPIR-81 PO Take 81 mg by mouth daily. atorvastatin 80 mg tablet Commonly known as:  LIPITOR  
take 1 tablet by mouth once daily  
  
 cholecalciferol (vitamin D3) 2,000 unit Tab Take  by mouth.  
  
 ergocalciferol 50,000 unit capsule Commonly known as:  ERGOCALCIFEROL  
take 1 capsule by mouth every week  
  
 ezetimibe 10 mg tablet Commonly known as:  Arleta Me Take 1 Tab by mouth daily. FISH OIL 1,000 mg Cap Generic drug:  omega-3 fatty acids-vitamin e Take 4,000 mg by mouth daily. losartan 100 mg tablet Commonly known as:  COZAAR  
take 1 tablet by mouth once daily  
  
 metoprolol succinate 25 mg XL tablet Commonly known as:  TOPROL-XL  
take 1 tablet by mouth once daily  
  
 nitroglycerin 0.4 mg SL tablet Commonly known as:  NITROSTAT  
1 Tab by SubLINGual route every five (5) minutes as needed for Chest Pain. We Performed the Following AMB POC HEMOGLOBIN A1C [48907 CPT(R)] Patient Instructions Continue current medications. Avoid dietary starch and sugar and follow a program of regular aerobic exercise. Return for annual physical and follow up in 6 months, sooner with any problems. Introducing [facility] & HEALTH SERVICES! Dear Kristen Pedraza: Thank you for requesting a [employer] account. Our records indicate that you already have an active [employer] account. You can access your account anytime at [email or web address] Did you know that you can access your hospital and ER discharge instructions at any time in [email or web address]? You can also review all of your test results from your hospital stay or ER visit. Additional Information If you have questions, please visit the Frequently Asked Questions section of the [email or web address] website at [email or web address] Remember, [email or web address] is NOT to be used for urgent needs. For medical emergencies, dial 911. Now available from your iPhone and Android! Please provide this summary of care documentation to your next provider. Your primary care clinician is listed as Mathias Romberg. If you have any questions after today's visit, please call [phone].

## 2017-09-15 NOTE — PROGRESS NOTES
Wilberto Lee is a 60 y.o. male here for follow up       1. Have you been to the ER, urgent care clinic or hospitalized since your last visit? NO.     2. Have you seen or consulted any other health care providers outside of the [address] [place] [address] since your last visit (Include any pap smears or colon screening)? NO      Do you have an Advanced Directive? NO    Would you like information on Advanced Directives?  NO

## 2017-12-11 ENCOUNTER — TELEPHONE (OUTPATIENT)
Dept: CARDIOLOGY CLINIC | Age: 57
End: 2017-12-11

## 2018-01-29 DIAGNOSIS — B35.4 TINEA CORPORIS: Primary | ICD-10-CM

## 2018-01-29 RX ORDER — KETOCONAZOLE 20 MG/G
CREAM TOPICAL 2 TIMES DAILY
Qty: 15 G | Refills: 1 | Status: SHIPPED | OUTPATIENT
Start: 2018-01-29 | End: 2018-03-16 | Stop reason: ALTCHOICE

## 2018-01-30 ENCOUNTER — OFFICE VISIT (OUTPATIENT)
Dept: CARDIOLOGY CLINIC | Age: 58
End: 2018-01-30

## 2018-01-30 VITALS
SYSTOLIC BLOOD PRESSURE: 135 MMHG | BODY MASS INDEX: 35.52 KG/M2 | DIASTOLIC BLOOD PRESSURE: 73 MMHG | HEIGHT: 73 IN | OXYGEN SATURATION: 98 % | WEIGHT: 268 LBS | HEART RATE: 79 BPM

## 2018-01-30 DIAGNOSIS — I34.0 MITRAL VALVE INSUFFICIENCY, UNSPECIFIED ETIOLOGY: ICD-10-CM

## 2018-01-30 DIAGNOSIS — I77.9 AORTA DISORDER (HCC): ICD-10-CM

## 2018-01-30 DIAGNOSIS — R06.02 SOB (SHORTNESS OF BREATH): Primary | ICD-10-CM

## 2018-01-30 NOTE — PROGRESS NOTES
Mr. Sanchez Whitmore is a pleasant 59 y.o. male with history of coronary artery disease, status post LAD and OM branch stent in April, 2016, aortic aneurysm, mitral regurgitation, dyslipidemia and hypertension. Mr. Sanchez Whitmore is here today for follow up appointment. He continues to have dyspnea on exertion with moderate exertion. He feels like it may be slightly worse over the last one year. He also admits he's gained weight since last time I saw him. He denies any exertional chest pain or chest tightness, however he does feel constant some sort of pressure in the chest and back area. However, this is constant without any relief, which does not get worse with exertion. He denies any presyncope or syncope. He denies any palpitations. He denies any otherwise new symptoms. He feels like most of the symptoms are stable and have been there for several years without any major change. Past Medical History:   Diagnosis Date    Aortic aneurysm     annulus 34 x 16mm,  SOV 39 x 39mm,  STJ 35 x 38mm,  MID ASC 39 x 38mm,  MAX DIAMETER (at level of RPA) 41 x 43mm,  PROX ARCH 37 x 37mm,  DIST ARCH 34 x 32mm,  DESC AO 28 x 28mm   (CTA 12/15)    Coronary artery disease     LAD - 4.0 x 18mm XIENCE 4/16, OM1 - 4.0 x 15mm XIENCE 4/16     Dyslipidemia     Hypertension     Mitral regurgitation     moderate (TTE 10/15),  moderate (ANITA 4/16)    Pulmonary fibrosis     upper lobes bilat (CTA 12/16)    Remote tobacco use     quit 2012        Past Surgical History:   Procedure Laterality Date    HX CERVICAL FUSION      cervical fusion    HX ORTHOPAEDIC      prior (L) knee surgery       Current Outpatient Prescriptions   Medication Sig    ketoconazole (NIZORAL) 2 % topical cream Apply  to affected area two (2) times a day.  ergocalciferol (ERGOCALCIFEROL) 50,000 unit capsule Take 1 Cap by mouth every seven (7) days.     losartan (COZAAR) 100 mg tablet take 1 tablet by mouth once daily    metoprolol succinate (TOPROL-XL) 25 mg XL tablet take 1 tablet by mouth once daily    ezetimibe (ZETIA) 10 mg tablet Take 1 Tab by mouth daily.  atorvastatin (LIPITOR) 80 mg tablet take 1 tablet by mouth once daily    albuterol (PROVENTIL HFA, VENTOLIN HFA, PROAIR HFA) 90 mcg/actuation inhaler Take 2 Puffs by inhalation every four (4) hours as needed.  nitroglycerin (NITROSTAT) 0.4 mg SL tablet 1 Tab by SubLINGual route every five (5) minutes as needed for Chest Pain.  ASPIRIN (ASPIR-81 PO) Take 81 mg by mouth daily.  omega-3 fatty acids-vitamin e (FISH OIL) 1,000 mg cap Take 4,000 mg by mouth daily. No current facility-administered medications for this visit. Allergies and Intolerances: Allergies   Allergen Reactions    Brilinta [Ticagrelor] Other (comments)     dyspnea    Sildenafil Other (comments)     Flushing    Vardenafil Other (comments)     Flushing          Family History:    Heart Disease Father        Social History:   He  reports that he quit smoking about 4 years ago. His smoking use included Cigarettes. He has a 40.00 pack-year smoking history. He has never used smokeless tobacco.  He  reports that he drinks alcohol. Physical:   Vitals:   BP: 135/73  HR: 79  Wt: 268 lb (121.6 kg)    Exam:   General:          Middle aged  gentleman, no complaints, no distress.     Head/Neck:     No JVD                           No bruits. Lungs:             Divehi respiratory distress.                             Clear with good effort. Heart:              Regular.  II/VI systolic murmur. No diastolic component. Abdomen:       Soft. Bowel sounds present  Extremities:     Intact pulses.                             UU edema.     Neurological:   Alert and oriented to person, place, time.                             No gross neurological deficit. Skin:  Warm and dry.     Review of Data:    LABS:   Lab Results   Component Value Date/Time    WBC 5.7 03/08/2017 09:06 AM    HGB 15.0 03/08/2017 09:06 AM    HCT 43.5 03/08/2017 09:06 AM PLATELET 947 74/73/4528 09:06 AM    MCV 93 03/08/2017 09:06 AM     Lab Results   Component Value Date/Time    Sodium 136 08/29/2017 09:18 AM    Potassium 4.1 08/29/2017 09:18 AM    Chloride 96 08/29/2017 09:18 AM    CO2 23 08/29/2017 09:18 AM    Anion gap 17.0 08/29/2017 09:18 AM    Glucose 94 08/29/2017 09:18 AM    BUN 17 08/29/2017 09:18 AM    Creatinine 0.6 08/29/2017 09:18 AM    BUN/Creatinine ratio 19 04/30/2016 04:20 AM    GFR est AA >60 04/30/2016 04:20 AM    GFR est non-AA >60 04/30/2016 04:20 AM    Calcium 8.7 08/29/2017 09:18 AM    Bilirubin, total 0.5 03/08/2017 09:06 AM    AST (SGOT) 23 08/29/2017 09:18 AM    Alk. phosphatase 77 03/08/2017 09:06 AM    Protein, total 7.4 03/08/2017 09:06 AM    Albumin 4.3 03/08/2017 09:06 AM    Globulin 3.1 03/08/2017 09:06 AM    A-G Ratio 1.4 03/08/2017 09:06 AM    ALT (SGPT) 31 08/29/2017 09:18 AM     Lab Results   Component Value Date/Time    Cholesterol, total 121 08/29/2017 09:18 AM    HDL Cholesterol 42 08/29/2017 09:18 AM    LDL, calculated 53 08/29/2017 09:18 AM    VLDL, calculated 26 08/29/2017 09:18 AM    Triglyceride 128 08/29/2017 09:18 AM     Lab Results   Component Value Date/Time    Hemoglobin A1c 5.8 03/08/2017 09:06 AM    Hemoglobin A1c (POC) 5.5 09/15/2017 01:18 PM     EKG:  April 2016:  Sinus rhythm, 54 beats per minute. NSTT. TRANSTHORACIC ECHOCARDIOGRAM: (02/17)  SUMMARY:  Left ventricle: Systolic function was at the lower limits of normal.  Ejection fraction was estimated in the range of 50 % to 55 %. There was  moderate hypokinesis of the mid anteroseptal wall(s). Wall thickness was  mildly increased. There was mild concentric hypertrophy. Mitral valve: There was a mild prolapse involving the posterior leaflet  with possible fenestration. There was moderate regurgitation.     TRANSESOPHAGEAL ECHOCARDIOGRAM: April 2016:   LEFT VENTRICLE: Size was normal. Systolic function was normal.    RIGHT VENTRICLE: The size was normal. Systolic function was normal.    LEFT ATRIUM: Size was normal. No thrombus was identified. There was no  spontaneous echo contrast (\"smoke\"). APPENDAGE: The size was normal. No  thrombus was identified. There was no spontaneous echo contrast (\"smoke\")  in the appendage. DOPPLER: The function was normal (normal emptying  velocity). ATRIAL SEPTUM: There was no evidence of intracardiac shunt by  peripherally-administered agitated saline contrast.    RIGHT ATRIUM: Size was normal.    MITRAL VALVE: There was minimal thickening. There was minimal  calcification. There appeared to be a partial flail of the P2 segment of  the posterior leaflet of the mitral valve. Torn chordae were not  visualized. The papillary muscle appeared to be intact. There was moderate  eccentric mitral regurgitation along the atrial aspect of the anterior  leaflet and along the atrial septum. There was no evidence for pulmonary  vein inflow reversal.  AORTIC VALVE: The valve was trileaflet. : There was no stenosis. There was no regurgitation. TRICUSPID VALVE: Normal valve structure. There was trivial regurgitation. AORTA: The root exhibited normal size. There was no atheroma. There was mild atheroma. There was no evidence for dissection. PERICARDIUM: There was no pericardial effusion. CTA CHEST w/wo: December 2015:  Pleura:  No effusion or pleural disease. Heart:  Heart normal in size. No pericardial effusion. Trileaflet aortic valve. Severe coronary artery disease. There is severe narrowing of the proximal left circumflex coronary artery (series 10 image 304) due to soft and calcified plaque. There is severe narrowing of the proximal LAD due to soft plaque (36). There is at least moderate stenosis of the right coronary artery at the atrioventricular groove; evaluation mildly motion degraded. Lymph nodes: There are calcified lymph nodes in the mediastinum. No enlarged lymph nodes. Pulmonary vessels:  Normally enhancing.  No filling defects. Pulmonary parenchyma: There is mild septal thickening in the upper lobes anteriorly. Subsegmental atelectasis at the left base and right middle lobe. Upper abdomen:  Liver overall low-attenuation. Calcified granulomas at the spleen. Remainder included hollow viscera are unremarkable. CT angiography:  Dilated ascending thoracic aorta, largest at the level of right pulmonary artery, then tapering prior to the aortic arch. Measurements of the aorta are obtained orthogonal to direction of flow: Aortic annulus, 34 x 16 mm. Sinus of Valsalva, 39 x 39 mm. Sinotubular ridge, 35 x 38 mm. Mid ascending aorta, 39 x 38 mm. Maximal dimension at the level of the right pulmonary artery, 41 x 43 mm. Aorta proximal to first arch vessel, 37 x 37 mm. Distal aortic arch, 34 x 32 mm. Descending aorta, 28 x 28 mm. Aorta at the hiatus, 26 x 27 mm. Impression:  Dilated ascending thoracic aorta with maximal dimensions at the level of right pulmonary artery, 41 x 43 mm.    Severe multifocal coronary artery disease. Please see details above. Hepatic steatosis. Evidence of old healed granulomatous disease. Mild fibrosis in the upper lobes. CATHETERIZATION: March and April 2016: (images and report personally reviewed)  LM - normal  LAD - 80-90% ostial/prox (4.0 x 18mm XIENCE 4/16)  D1 - 70%  Cx - 50-60% prox  OM1 - 70-80% mid (4.0 x 15mm XIENCE 4/16)  RCA - plaquing  RPDA - normal  RPLV - normal  EF 50%    MR 2+    STRESS TEST (01/17)  Fair exercise tolerance. Normal blood pressure response to exercise. Myocardial perfusion imaging is normal  There is no area of prior scarring or ongoing ischemia. Overall left ventricle systolic function was severely reduced  without regional wall motion abnormalities (as noted above). Risk/extent of ischemia: Low risk. IMPRESSION AND PLAN:    Coronary artery disease:   Mr. Adalberto Cortes had an LAD and OM branch stent in April of 2016.     Stress test in 01/17 without on going ischemia. He does have documented 70% stenosis of first diagonal branch. But does not appear to have worsening anginal symptoms. Most of his symptoms are chronic and stable. For now he would stay on aspirin and Toprol. S/L NTG as needed. Denies any use of S/L NTG since last time. Aortic aneurysm:  Mr. Boo Restrepo had a cardiac CTA from December, 2015, which suggested descending aortic aneurysm. The measurements are noted above. Given his body habitus I did not believe that dimensions are not markedly abnormal.  For now I would recommend better blood pressure control and serial CAT scan in 6-9 month. Mitral regurgitation:  Mr. Boo Restrepo had an echocardiogram, as well as ANITA in 2016, which showed moderate mitral regurgitation, likely from prolapse of the posterior leaflet. Last ECHO with moderate MR in 02/17. Stable  Currently he does not have any fluid overload on exam.  He is on Cozaar and Toprol. He does not require any diuretics at this time. He does not have any objective events of atrial fibrillation. Will repeat ECHO in 6-9 months for follow up on MR severity. HTN:   BP today is 135/73 mm Hg. Continue BB and ARB. Folllow up in 9 months.

## 2018-01-30 NOTE — MR AVS SNAPSHOT
[facility] 
 
 
 [address] [facility] [address] [address] 
[phone] Patient: Esha Arce MRN: [id number] Visit Information Date & Time Provider Department Dept. Phone Encounter #  
 1/30/2018  1:00 PM Shorty Tyler  [facility] Specialist at [facility] [phone] [id number] Follow-up Instructions Return in about 7 months (around 8/30/2018). Your Appointments 3/16/2018  1:00 PM  
PHYSICAL with Moira Diggs MD  
[employer] [address] [place] [address]) Appt Note: cpe  
 [address] [facility] [address] [facility] [address]  
[phone]  
  
   
 [address] [facility]  
  
    
 8/28/2018  1:30 PM  
Follow Up with Sharon Bravo MD  
Cardio Specialist at [address]) Appt Note: fu after echo and CT  
 [address] [facility] [address] [address] [address]  
  
   
 [address] [place] [address] [address] Upcoming Health Maintenance Date Due FOBT Q 1 YEAR AGE 50-75 10/1/2008 Influenza Age 5 to Adult 8/1/2017 DTaP/Tdap/Td series (2 - Td) 5/7/2020 Allergies as of 1/30/2018  Review Complete On: 1/30/2018 By: Mitchel Schilder, RN Severity Noted Reaction Type Reactions Brilinta [Ticagrelor]    Other (comments) dyspnea Sildenafil Low 08/10/2016    Other (comments) [place] Vardenafil Low 08/10/2016    Other (comments) [place] Current Immunizations  Reviewed on 10/21/2016 Name Date Influenza Vaccine [place] Ashley) 10/21/2016 Influenza Vaccine PF 9/30/2013 Pneumococcal Conjugate (PCV-13) 8/18/2015  9:25 AM  
 Pneumococcal Polysaccharide (PPSV-23) 2/10/2012 Pneumococcal Vaccine (Unspecified Type) 7/30/2013 Tdap 5/7/2010 Not reviewed this visit You Were Diagnosed With   
  
 Codes Comments  SOB (shortness of breath)    -  Primary ICD-10-CM: R06.02 
 ICD-9-CM: 786.05 Mitral valve insufficiency, unspecified etiology     ICD-10-CM: I34.0 ICD-9-CM: 424.0 Aorta disorder ([address]     ICD-10-CM: I77.9 ICD-9-CM: 382. 9 Vitals BP Pulse Height(growth percentile) Weight(growth percentile) SpO2 BMI  
 135/73 79 6' 1\" (1.854 m) 268 lb (121.6 kg) 98% 35.36 kg/m2 Smoking Status Former Smoker BMI and BSA Data Body Mass Index Body Surface Area  
 35.36 kg/m 2 2.5 m 2 Preferred Pharmacy Pharmacy Name Phone [facility] [address] [address] [phone] Your Updated Medication List  
  
   
This list is accurate as of: 1/30/18  1:26 PM.  Always use your most recent med list.  
  
  
  
  
 albuterol 90 mcg/actuation inhaler Commonly known as:  PROVENTIL HFA, VENTOLIN HFA, PROAIR HFA Take 2 Puffs by inhalation every four (4) hours as needed. ASPIR-81 PO Take 81 mg by mouth daily. atorvastatin 80 mg tablet Commonly known as:  LIPITOR  
take 1 tablet by mouth once daily  
  
 ergocalciferol 50,000 unit capsule Commonly known as:  ERGOCALCIFEROL Take 1 Cap by mouth every seven (7) days. ezetimibe 10 mg tablet Commonly known as:  Media Men Take 1 Tab by mouth daily. FISH OIL 1,000 mg Cap Generic drug:  omega-3 fatty acids-vitamin e Take 4,000 mg by mouth daily. ketoconazole 2 % topical cream  
Commonly known as:  NIZORAL Apply  to affected area two (2) times a day. losartan 100 mg tablet Commonly known as:  COZAAR  
take 1 tablet by mouth once daily  
  
 metoprolol succinate 25 mg XL tablet Commonly known as:  TOPROL-XL  
take 1 tablet by mouth once daily  
  
 nitroglycerin 0.4 mg SL tablet Commonly known as:  NITROSTAT  
1 Tab by SubLINGual route every five (5) minutes as needed for Chest Pain. Follow-up Instructions Return in about 7 months (around 8/30/2018). Introducing \[facility]\"" & HEALTH SERVICES! Dear Fred Davalos: Thank you for requesting a Fingerprint account. Our records indicate that you already have an active Fingerprint account. You can access your account anytime at [email or web address] Did you know that you can access your hospital and ER discharge instructions at any time in Fingerprint? You can also review all of your test results from your hospital stay or ER visit. Additional Information If you have questions, please visit the Frequently Asked Questions section of the Fingerprint website at [email or web address] Remember, Fingerprint is NOT to be used for urgent needs. For medical emergencies, dial 911. Now available from your iPhone and Android! Please provide this summary of care documentation to your next provider. Your primary care clinician is listed as Lauren Huizar. If you have any questions after today's visit, please call [phone].

## 2018-02-08 ENCOUNTER — OFFICE VISIT (OUTPATIENT)
Dept: PULMONOLOGY | Facility: CLINIC | Age: 58
End: 2018-02-08

## 2018-02-08 VITALS
BODY MASS INDEX: 35.52 KG/M2 | TEMPERATURE: 98.1 F | RESPIRATION RATE: 20 BRPM | HEIGHT: 73 IN | WEIGHT: 268 LBS | HEART RATE: 68 BPM | OXYGEN SATURATION: 96 % | SYSTOLIC BLOOD PRESSURE: 156 MMHG | DIASTOLIC BLOOD PRESSURE: 90 MMHG

## 2018-02-08 DIAGNOSIS — F17.201 TOBACCO ABUSE, IN REMISSION: ICD-10-CM

## 2018-02-08 DIAGNOSIS — J84.10 PULMONARY FIBROSIS (HCC): ICD-10-CM

## 2018-02-08 DIAGNOSIS — R06.02 SHORTNESS OF BREATH: ICD-10-CM

## 2018-02-08 DIAGNOSIS — J37.0 CHRONIC LARYNGITIS: ICD-10-CM

## 2018-02-08 DIAGNOSIS — R59.0 MEDIASTINAL LYMPHADENOPATHY: ICD-10-CM

## 2018-02-08 DIAGNOSIS — R49.0 HOARSENESS: Primary | ICD-10-CM

## 2018-02-08 NOTE — PROGRESS NOTES
[facility]  Pulmonary, Critical Care, and Sleep Medicine      Pulmonary Office Progress Notes    Name: Gaetano Herndon     : 1960     Date: 2018        Subjective:     Patient is a 59 y.o. male is here for follow up of shortness of breath developing over the last year. The patient can now notice shortness of breath at rest and with minimal exertion. He cannot walk to the top of a flight of stairs without slowing down or resting. He recently saw his cardiologist, and there was no clear indication that his dyspnea was from a cardiac source. No constant cough. No orthopnea. No LE edema. No peculiar weakness. His voice has been constantly hoarse for at least the last couple months. He needs to even catch his breath after prolonged speaking. He has a history of principally apical pulmonary fibrosis. No HP, sarcoidosis, or regular silica exposure. He denies any chest surgery or intubation    No epistaxis, no nasal drainage, no difficulty in swallowing, no redness in eyes  No exertional chest pain, no chronic leg edema, no syncope  No unusual bleeding symptoms  No weight loss. Weight gain of 20 pounds    Past Medical History:   Diagnosis Date    Aortic aneurysm     annulus 34 x 16mm,  SOV 39 x 39mm,  STJ 35 x 38mm,  MID ASC 39 x 38mm,  MAX DIAMETER (at level of RPA) 41 x 43mm,  PROX ARCH 37 x 37mm,  DIST ARCH 34 x 32mm,  DESC AO 28 x 28mm   (CTA 12/15)    Coronary artery disease     LAD - 4.0 x 18mm XIENCE , OM1 - 4.0 x 15mm XIENCE      Dyslipidemia     Hypertension     Mitral regurgitation     moderate (TTE ),  moderate (ANITA )    Pulmonary fibrosis     upper lobes bilat (CTA )    Remote tobacco use     quit . One ppd for 40 years.        Objective:     Visit Vitals    /90 (BP 1 Location: Left arm, BP Patient Position: Sitting)    Pulse 68    Temp 98.1 °F (36.7 °C) (Oral)    Resp 20    Ht 6' 1\" (1.854 m)    Wt 121.6 kg (268 lb)    SpO2 96%    BMI 35.36 kg/m2        Physical Exam:   General: comfortable, no acute distress  HEENT: sclera anicteric, EOM intact  Neck: No adenopathy, no JVD, supple  CVS: S1S2 normal, no appreciable murmur or gallop  RS: Mild stridulous breath sounds over trachea. Mod AE bilaterally, no tactile fremitus or egophony, no accessory muscle use. No wheezing, rales or rhonchi  Abd: soft, non tender  Neuro: non focal, awake, alert. Normal gais  Extrm: no leg edema, clubbing or cyanosis    Data review:     PFTs:  Normal 8/2016  Imaging:  No recent radiographs available    Patient Active Problem List   Diagnosis Code    Essential hypertension I10    Mixed hyperlipidemia E78.2    Mitral regurgitation I34.0    Aortic aneurysm I71.9    Pulmonary fibrosis J84.10    Vitamin D deficiency E55.9    Bilateral carpal tunnel syndrome G56.03    Ex-smoker Z87.891    Granulomatous lung disease ([address] presumed J84.10    Coronary artery disease involving native coronary artery of native heart without angina pectoris I25.10    Obesity (BMI 30-39. 9) E66.9     IMPRESSION:   · Shortness of breath  · Hoarseness  · 40 pack year history of tobacco abuse with none since 2012  · BUL interstitial lung disease  · Calcified mediastinal LAD      RECOMMENDATIONS:   · Chest CT with contrast to screen for PE and evaluate mediastinum for source of hoarseness  · Neck CT with contrast for hoarseness and stridulous breath sounds  · Spirometry with bronchodilator  · Recommend annual, low-dose lung cancer screening chest CT starting next year after the above studies are completed. · Therapeutic trial of Anoro and albuterol. Recent cardiology evaluation unremarkable. Shortness of breath and smoking history. Follow-up Disposition: 4 weeks or after completion of echo and above studies. Jackson Tovarr on return to clinic. Total patient care time: 40 minutes    Mr. Nataliia Mason has a reminder for a \"due or due soon\" health maintenance.  I have asked that he contact his primary care provider for follow-up on this health maintenance.          Constance Temple MD

## 2018-02-08 NOTE — MR AVS SNAPSHOT
Saeed [facility] [address] [address] 
[phone] Patient: Yvonne Mao MRN: [id number] OSZ:[id number] Visit Information Date & Time Provider Department Dept. Phone Encounter #  
 2/8/2018 10:30 AM MD Indira Ge [facility] at [facility] [phone] Follow-up Instructions Return in about 4 weeks (around 3/8/2018) for or one week after chest CT and echocardiogram done. irma at office f-u. Follow-up and Disposition History Your Appointments 3/16/2018  1:00 PM  
PHYSICAL with Yfn Levin MD  
3 [facility]) Appt Note: cpe  
 400 [facility] [address] [place] [id number]  
[phone]  
  
   
 [address] [facility]  
  
    
 8/28/2018  1:30 PM  
Follow Up with Dmitriy Odonnell MD  
Cardio Specialist at [facility]) Appt Note: fu after echo and CT  
 [facility] [address] [address] [address] Upcoming Health Maintenance Date Due FOBT Q 1 YEAR AGE 50-75 10/1/2008 Influenza Age 5 to Adult 8/1/2017 DTaP/Tdap/Td series (2 - Td) 5/7/2020 Allergies as of 2/8/2018  Review Complete On: 2/8/2018 By: Shruthi Valentin MD  
  
 Severity Noted Reaction Type Reactions Brilinta [Ticagrelor]    Other (comments) dyspnea Sildenafil Low 08/10/2016    Other (comments) [place] Vardenafil Low 08/10/2016    Other (comments) [place] Current Immunizations  Reviewed on 10/21/2016 Name Date Influenza Vaccine Pixie Cross) 10/21/2016 Influenza Vaccine PF 9/30/2013 Pneumococcal Conjugate (PCV-13) 8/18/2015  9:25 AM  
 Pneumococcal Polysaccharide (PPSV-23) 2/10/2012 Pneumococcal Vaccine (Unspecified Type) 7/30/2013 Tdap 5/7/2010 Not reviewed this visit You Were Diagnosed With   
  
 Codes Comments Hoarseness    -  Primary ICD-10-CM: R49.0 ICD-9-CM: 784.42 Chronic laryngitis     ICD-10-CM: J37.0 ICD-9-CM: 476.0 Shortness of breath     ICD-10-CM: R06.02 
ICD-9-CM: 786.05   
 Pulmonary fibrosis (HCC)     ICD-10-CM: J84.10 ICD-9-CM: 014 Tobacco abuse, in remission     ICD-10-CM: F17.201 ICD-9-CM: V15.82 Mediastinal lymphadenopathy     ICD-10-CM: R59.0 ICD-9-CM: 944. 6 Vitals BP Pulse Temp Resp Height(growth percentile) Weight(growth percentile) 156/90 (BP 1 Location: Left arm, BP Patient Position: Sitting) 68 98.1 °F (36.7 °C) (Oral) 20 6' 1\" (1.854 m) 268 lb (121.6 kg) SpO2 BMI Smoking Status 96% 35.36 kg/m2 Former Smoker BMI and BSA Data Body Mass Index Body Surface Area  
 35.36 kg/m 2 2.5 m 2 Preferred Pharmacy Pharmacy Name Phone [facility] [address] [address] [address] [phone] Your Updated Medication List  
  
   
This list is accurate as of: 2/8/18 11:59 PM.  Always use your most recent med list.  
  
  
  
  
 albuterol 90 mcg/actuation inhaler Commonly known as:  PROVENTIL HFA, VENTOLIN HFA, PROAIR HFA Take 2 Puffs by inhalation every four (4) hours as needed. ASPIR-81 PO Take 162 mg by mouth daily. atorvastatin 80 mg tablet Commonly known as:  LIPITOR  
take 1 tablet by mouth once daily  
  
 ergocalciferol 50,000 unit capsule Commonly known as:  ERGOCALCIFEROL Take 1 Cap by mouth every seven (7) days. ezetimibe 10 mg tablet Commonly known as:  Narcisa Muss Take 1 Tab by mouth daily. FISH OIL 1,000 mg Cap Generic drug:  omega-3 fatty acids-vitamin e Take 4,000 mg by mouth daily. ketoconazole 2 % topical cream  
Commonly known as:  NIZORAL Apply  to affected area two (2) times a day. losartan 100 mg tablet Commonly known as:  COZAAR  
take 1 tablet by mouth once daily metoprolol succinate 25 mg XL tablet Commonly known as:  TOPROL-XL  
take 1 tablet by mouth once daily  
  
 nitroglycerin 0.4 mg SL tablet Commonly known as:  NITROSTAT  
1 Tab by SubLINGual route every five (5) minutes as needed for Chest Pain.  
  
 umeclidinium-vilanterol 62.5-25 mcg/actuation inhaler Commonly known as:  Pollie Kussmaul Take 1 Puff by inhalation daily. Prescriptions Sent to Pharmacy Refills  
 umeclidinium-vilanterol (ANORO ELLIPTA) 62.5-25 mcg/actuation inhaler 11 Sig: Take 1 Puff by inhalation daily. Class: Normal  
 Pharmacy: [address] Ph #: [phone] Route: Inhalation We Performed the Following AMB POC SPIROMETRY W/BRONCHODILATOR [21471 CPT(R)] Follow-up Instructions Return in about 4 weeks (around 3/8/2018) for or one week after chest CT and echocardiogram done. irma at office f-u. To-Do List   
 02/08/2018 Imaging:  CT CHEST W CONT   
  
 02/08/2018 Imaging:  CT NECK SOFT TISSUE W CONT Patient Instructions Shortness of Breath: Care Instructions Your Care Instructions Shortness of breath has many causes. Sometimes conditions such as anxiety can lead to shortness of breath. Some people get mild shortness of breath when they exercise. Trouble breathing also can be a symptom of a serious problem, such as asthma, lung disease, emphysema, heart problems, and pneumonia. If your shortness of breath continues, you may need tests and treatment. Watch for any changes in your breathing and other symptoms. Follow-up care is a key part of your treatment and safety. Be sure to make and go to all appointments, and call your doctor if you are having problems. It's also a good idea to know your test results and keep a list of the medicines you take. How can you care for yourself at home? · Do not smoke or allow others to smoke around you. If you need help quitting, talk to your doctor about stop-smoking programs and medicines. These can increase your chances of quitting for good. · Get plenty of rest and sleep. · Take your medicines exactly as prescribed. Call your doctor if you think you are having a problem with your medicine. · Find healthy ways to deal with stress. ¨ Exercise daily. ¨ Get plenty of sleep. ¨ Eat regularly and well. When should you call for help? Call 911 anytime you think you may need emergency care. For example, call if: 
? · You have severe shortness of breath. ? · You have symptoms of a heart attack. These may include: ¨ Chest pain or pressure, or a strange feeling in the chest. 
¨ Sweating. ¨ Shortness of breath. ¨ Nausea or vomiting. ¨ Pain, pressure, or a strange feeling in the back, neck, jaw, or upper belly or in one or both shoulders or arms. ¨ Lightheadedness or sudden weakness. ¨ A fast or irregular heartbeat. After you call 911, the  may tell you to chew 1 adult-strength or 2 to 4 low-dose aspirin. Wait for an ambulance. Do not try to drive yourself. ?Call your doctor now or seek immediate medical care if: 
? · Your shortness of breath gets worse or you start to wheeze. Wheezing is a high-pitched sound when you breathe. ? · You wake up at night out of breath or have to prop your head up on several pillows to breathe. ? · You are short of breath after only light activity or while at rest. ? Watch closely for changes in your health, and be sure to contact your doctor if: 
? · You do not get better over the next 1 to 2 days. Where can you learn more? Go to http://delon-timur.info/. Enter S780 in the search box to learn more about \"Shortness of Breath: Care Instructions. \" Current as of: May 12, 2017 Content Version: 11.4 © 0881-4694 Healthwise, Sold.  Care instructions adapted under license by [address] (which disclaims liability or warranty for this information). If you have questions about a medical condition or this instruction, always ask your healthcare professional. [address] any warranty or liability for your use of this information. Patient Instructions History Introducing [address]! Dear Billy Terrazasd: Thank you for requesting a "[employer]" account. Our records indicate that you already have an active "[employer]" account. You can access your account anytime at [email or web address] Did you know that you can access your hospital and ER discharge instructions at any time in "[employer]"? You can also review all of your test results from your hospital stay or ER visit. Additional Information If you have questions, please visit the Frequently Asked Questions section of the "[employer]" website at [email or web address] Remember, "[employer]" is NOT to be used for urgent needs. For medical emergencies, dial 911. Now available from your iPhone and Android! Please provide this summary of care documentation to your next provider. Your primary care clinician is listed as Ponce Mendez. If you have any questions after today's visit, please call [phone].

## 2018-02-08 NOTE — PATIENT INSTRUCTIONS
Shortness of Breath: Care Instructions  Your Care Instructions  Shortness of breath has many causes. Sometimes conditions such as anxiety can lead to shortness of breath. Some people get mild shortness of breath when they exercise. Trouble breathing also can be a symptom of a serious problem, such as asthma, lung disease, emphysema, heart problems, and pneumonia. If your shortness of breath continues, you may need tests and treatment. Watch for any changes in your breathing and other symptoms. Follow-up care is a key part of your treatment and safety. Be sure to make and go to all appointments, and call your doctor if you are having problems. It's also a good idea to know your test results and keep a list of the medicines you take. How can you care for yourself at home? · Do not smoke or allow others to smoke around you. If you need help quitting, talk to your doctor about stop-smoking programs and medicines. These can increase your chances of quitting for good. · Get plenty of rest and sleep. · Take your medicines exactly as prescribed. Call your doctor if you think you are having a problem with your medicine. · Find healthy ways to deal with stress. ¨ Exercise daily. ¨ Get plenty of sleep. ¨ Eat regularly and well. When should you call for help? Call 911 anytime you think you may need emergency care. For example, call if:  ? · You have severe shortness of breath. ? · You have symptoms of a heart attack. These may include:  ¨ Chest pain or pressure, or a strange feeling in the chest.  ¨ Sweating. ¨ Shortness of breath. ¨ Nausea or vomiting. ¨ Pain, pressure, or a strange feeling in the back, neck, jaw, or upper belly or in one or both shoulders or arms. ¨ Lightheadedness or sudden weakness. ¨ A fast or irregular heartbeat. After you call 911, the  may tell you to chew 1 adult-strength or 2 to 4 low-dose aspirin. Wait for an ambulance. Do not try to drive yourself.    ?Call your doctor now or seek immediate medical care if:  ? · Your shortness of breath gets worse or you start to wheeze. Wheezing is a high-pitched sound when you breathe. ? · You wake up at night out of breath or have to prop your head up on several pillows to breathe. ? · You are short of breath after only light activity or while at rest.   ? Watch closely for changes in your health, and be sure to contact your doctor if:  ? · You do not get better over the next 1 to 2 days. Where can you learn more? Go to http://delon-timur.info/. Enter S780 in the search box to learn more about \"Shortness of Breath: Care Instructions. \"  Current as of: May 12, 2017  Content Version: 11.4  © 4804-0572 [employer]. Care instructions adapted under license by [employer] (which disclaims liability or warranty for this information). If you have questions about a medical condition or this instruction, always ask your healthcare professional. [address] any warranty or liability for your use of this information.

## 2018-02-08 NOTE — PROGRESS NOTES
Chief Complaint   Patient presents with    Shortness of Breath     patient states that he is here today to establish care with a \"lung doctor to monitor his lungs. \" he complains that the SOB has worsened progressively over the past year and he does have difficulty going up stairs. He does also mention that when talking he notices that he has to stop to breathe more frequently. 1. Have you been to the ER, urgent care clinic since your last visit? Hospitalized since your last visit? No    2. Have you seen or consulted any other health care providers outside of the [address] [place] [address] since your last visit? Include any pap smears or colon screening.  No

## 2018-02-22 ENCOUNTER — HOSPITAL ENCOUNTER (OUTPATIENT)
Dept: CT IMAGING | Age: 58
Discharge: HOME | End: 2018-02-22
Attending: INTERNAL MEDICINE
Payer: COMMERCIAL

## 2018-02-22 DIAGNOSIS — J37.0 CHRONIC LARYNGITIS: ICD-10-CM

## 2018-02-22 DIAGNOSIS — F17.201 TOBACCO ABUSE, IN REMISSION: ICD-10-CM

## 2018-02-22 DIAGNOSIS — R06.02 SHORTNESS OF BREATH: ICD-10-CM

## 2018-02-22 DIAGNOSIS — R49.0 HOARSENESS: ICD-10-CM

## 2018-02-22 DIAGNOSIS — R59.0 MEDIASTINAL LYMPHADENOPATHY: ICD-10-CM

## 2018-02-22 DIAGNOSIS — J84.10 PULMONARY FIBROSIS (HCC): ICD-10-CM

## 2018-02-22 PROCEDURE — 70491 CT SOFT TISSUE NECK W/DYE: CPT

## 2018-02-22 PROCEDURE — 71260 CT THORAX DX C+: CPT

## 2018-02-22 PROCEDURE — 74011636320 HC RX REV CODE- 636/320: Performed by: INTERNAL MEDICINE

## 2018-02-22 RX ADMIN — IOPAMIDOL 80 ML: 612 INJECTION, SOLUTION INTRAVENOUS at 08:32

## 2018-02-26 ENCOUNTER — OFFICE VISIT (OUTPATIENT)
Dept: FAMILY MEDICINE CLINIC | Age: 58
End: 2018-02-26

## 2018-02-26 VITALS
DIASTOLIC BLOOD PRESSURE: 70 MMHG | RESPIRATION RATE: 22 BRPM | HEIGHT: 73 IN | TEMPERATURE: 98.7 F | OXYGEN SATURATION: 98 % | BODY MASS INDEX: 35.12 KG/M2 | HEART RATE: 67 BPM | WEIGHT: 265 LBS | SYSTOLIC BLOOD PRESSURE: 130 MMHG

## 2018-02-26 DIAGNOSIS — J22 ACUTE RESPIRATORY INFECTION: Primary | ICD-10-CM

## 2018-02-26 DIAGNOSIS — R06.2 WHEEZING: ICD-10-CM

## 2018-02-26 LAB
FLUAV+FLUBV AG NOSE QL IA.RAPID: NEGATIVE POS/NEG
FLUAV+FLUBV AG NOSE QL IA.RAPID: NEGATIVE POS/NEG
VALID INTERNAL CONTROL?: YES

## 2018-02-26 RX ORDER — PREDNISONE 10 MG/1
TABLET ORAL
Qty: 21 TAB | Refills: 0 | Status: SHIPPED | OUTPATIENT
Start: 2018-02-26 | End: 2018-03-16 | Stop reason: ALTCHOICE

## 2018-02-26 RX ORDER — ALBUTEROL SULFATE 0.83 MG/ML
2.5 SOLUTION RESPIRATORY (INHALATION) ONCE
Qty: 1 EACH | Refills: 0
Start: 2018-02-26 | End: 2018-02-26

## 2018-02-26 RX ORDER — AZITHROMYCIN 250 MG/1
TABLET, FILM COATED ORAL
Qty: 6 TAB | Refills: 0 | Status: SHIPPED | OUTPATIENT
Start: 2018-02-26 | End: 2018-03-03

## 2018-02-26 RX ORDER — CODEINE PHOSPHATE AND GUAIFENESIN 10; 100 MG/5ML; MG/5ML
SOLUTION ORAL
Qty: 180 ML | Refills: 1 | Status: SHIPPED | OUTPATIENT
Start: 2018-02-26 | End: 2018-03-16 | Stop reason: ALTCHOICE

## 2018-02-26 NOTE — MR AVS SNAPSHOT
Abiodun Koo 
 
 
 [address] [facility] [id number] [phone] Patient: Vito Shaw MRN: [id number] Visit Information Date & Time Provider Department Dept. Phone Encounter #  
 2/26/2018  1:30 PM Leatha Her, [facility] [phone] [id number] Your Appointments 3/16/2018  1:00 PM  
PHYSICAL with Leatha Her MD  
3 [facility]) Appt Note: cpe  
 [address] [facility] [address] [phone]  
  
   
 [address]  
  
    
 8/28/2018  1:30 PM  
Follow Up with Zana Campbell MD  
Cardio Specialist at [facility]) Appt Note: fu after echo and CT  
 [address] [facility] [address] [address] [address]  
  
   
 [address] [facility] [address] Upcoming Health Maintenance Date Due FOBT Q 1 YEAR AGE 50-75 10/1/2008 Influenza Age 5 to Adult 8/1/2017 DTaP/Tdap/Td series (2 - Td) 5/7/2020 Allergies as of 2/26/2018  Review Complete On: 2/26/2018 By: Leatha Her MD  
  
 Severity Noted Reaction Type Reactions Brilinta [Ticagrelor]    Other (comments) dyspnea Sildenafil Low 08/10/2016    Other (comments) [place] Vardenafil Low 08/10/2016    Other (comments) [place] Current Immunizations  Reviewed on 10/21/2016 Name Date Influenza Vaccine Jaxson Okeefe) 10/21/2016 Influenza Vaccine PF 9/30/2013 Pneumococcal Conjugate (PCV-13) 8/18/2015  9:25 AM  
 Pneumococcal Polysaccharide (PPSV-23) 2/10/2012 Pneumococcal Vaccine (Unspecified Type) 7/30/2013 Tdap 5/7/2010 Not reviewed this visit You Were Diagnosed With   
  
 Codes Comments Acute respiratory infection    -  Primary ICD-10-CM: Canda Gallo ICD-9-CM: 519.8 Wheezing     ICD-10-CM: R06.2 ICD-9-CM: 786.07 Vitals BP Pulse Temp Resp Height(growth percentile) Weight(growth percentile) 130/70 (BP 1 Location: Left arm, BP Patient Position: Sitting) 67 98.7 °F (37.1 °C) (Oral) 22 6' 1\" (1.854 m) 265 lb (120.2 kg) SpO2 BMI Smoking Status 98% 34.96 kg/m2 Former Smoker BMI and BSA Data Body Mass Index Body Surface Area 34.96 kg/m 2 2.49 m 2 Preferred Pharmacy Pharmacy Name Phone [phone] [phone] Your Updated Medication List  
  
   
This list is accurate as of 2/26/18  2:04 PM.  Always use your most recent med list.  
  
  
  
  
 * albuterol 90 mcg/actuation inhaler Commonly known as:  PROVENTIL HFA, VENTOLIN HFA, PROAIR HFA Take 2 Puffs by inhalation every four (4) hours as needed. * albuterol 2.5 mg /3 mL (0.083 %) nebulizer solution Commonly known as:  PROVENTIL VENTOLIN  
3 mL by Nebulization route once for 1 dose. ASPIR-81 PO Take 162 mg by mouth daily. atorvastatin 80 mg tablet Commonly known as:  LIPITOR  
take 1 tablet by mouth once daily  
  
 azithromycin 250 mg tablet Commonly known as:  Pena Bon Take 2 tablets today, then take 1 tablet daily  
  
 ergocalciferol 50,000 unit capsule Commonly known as:  ERGOCALCIFEROL Take 1 Cap by mouth every seven (7) days. ezetimibe 10 mg tablet Commonly known as:  Lunda Harps Take 1 Tab by mouth daily. FISH OIL 1,000 mg Cap Generic drug:  omega-3 fatty acids-vitamin e Take 4,000 mg by mouth daily. guaiFENesin-codeine 100-10 mg/5 mL solution Commonly known as:  ROBITUSSIN AC  
5-10 mL (1-2 teaspoons) every 6 hours if needed for cough. ketoconazole 2 % topical cream  
Commonly known as:  NIZORAL Apply  to affected area two (2) times a day. losartan 100 mg tablet Commonly known as:  COZAAR  
take 1 tablet by mouth once daily  
  
 metoprolol succinate 25 mg XL tablet Commonly known as:  TOPROL-XL  
take 1 tablet by mouth once daily  
  
 nitroglycerin 0.4 mg SL tablet Commonly known as:  NITROSTAT  
1 Tab by SubLINGual route every five (5) minutes as needed for Chest Pain. predniSONE 10 mg dose pack Commonly known as:  STERAPRED DS Follow dose pack instructions  
  
 umeclidinium-vilanterol 62.5-25 mcg/actuation inhaler Commonly known as:  Georges Petrin Take 1 Puff by inhalation daily. * Notice: This list has 2 medication(s) that are the same as other medications prescribed for you. Read the directions carefully, and ask your doctor or other care provider to review them with you. Prescriptions Printed Refills  
 guaiFENesin-codeine (ROBITUSSIN AC) 100-10 mg/5 mL solution 1 Si-10 mL (1-2 teaspoons) every 6 hours if needed for cough. Class: Print Prescriptions Sent to Pharmacy Refills  
 azithromycin (ZITHROMAX) 250 mg tablet 0 Sig: Take 2 tablets today, then take 1 tablet daily Class: Normal  
 Pharmacy: [address] Yany Milligan Ph #: [phone]  
 predniSONE (STERAPRED DS) 10 mg dose pack 0 Sig: Follow dose pack instructions Class: Normal  
 Pharmacy: [address] Ph #: [phone] We Performed the Following ALBUTEROL, INHAL. SOL., FDA-APPROVED FINAL, NON-COMPOUND UNIT DOSE, 1 MG [ HCPCS] AMB POC CLARISA INFLUENZA A/B TEST [63577 CPT(R)] INHAL RX, AIRWAY OBST/DX SPUTUM INDUCT [id number] CPT(R)] Patient Instructions Complete prescribed course of antibiotics. Follow dose pack instructions Cheratussin syrup, 5-10 mL (1-2 teaspoons) every 6 hours if needed for cough. Follow up for new symptoms, worsening symptoms or failure to improve. Introducing [facility]! Dear Signe Curling: Thank you for requesting a [employer] account.   Our records indicate that you already have an active "[employer] account. You can access your account anytime at [email or web address] Did you know that you can access your hospital and ER discharge instructions at any time in "[employer]? You can also review all of your test results from your hospital stay or ER visit. Additional Information If you have questions, please visit the Frequently Asked Questions section of the "[employer] website at [email or web address] Remember, "[employer] is NOT to be used for urgent needs. For medical emergencies, dial 911. Now available from your iPhone and Android! Please provide this summary of care documentation to your next provider. Your primary care clinician is listed as Moira Diggs. If you have any questions after today's visit, please call [phone].

## 2018-02-26 NOTE — PROGRESS NOTES
Vincenzo Blas is a 59 y.o. male here for cold symptoms      Vincenzo Blas is a 59 y.o. male (: 1960) presenting to address:    Chief Complaint   Patient presents with    Cold Symptoms     pt states he's had a cough, congestion, SOB, body aches since last wednesday,        Vitals:    18 1328   BP: 130/70   Pulse: 67   Resp: 22   Temp: 98.7 °F (37.1 °C)   TempSrc: Oral   SpO2: 98%   Weight: 265 lb (120.2 kg)   Height: 6' 1\" (1.854 m)   PainSc:   2   PainLoc: Generalized       Hearing/Vision:   No exam data present    Learning Assessment:     Learning Assessment 2016   PRIMARY LEARNER Patient   HIGHEST LEVEL OF EDUCATION - PRIMARY LEARNER  -   BARRIERS PRIMARY LEARNER -   CO-LEARNER CAREGIVER -   PRIMARY LANGUAGE ENGLISH   LEARNER PREFERENCE PRIMARY READING   ANSWERED BY pt   RELATIONSHIP SELF     Depression Screening:     PHQ over the last two weeks 2018   Little interest or pleasure in doing things Not at all   Feeling down, depressed or hopeless Not at all   Total Score PHQ 2 0     Fall Risk Assessment:   No flowsheet data found. Abuse Screening:   No flowsheet data found. Coordination of Care Questionaire:   1. Have you been to the ER, urgent care clinic since your last visit? Hospitalized since your last visit? NO    2. Have you seen or consulted any other health care providers outside of the [address] [place] [address] since your last visit? Include any pap smears or colon screening. NO    Advanced Directive:   1. Do you have an Advanced Directive? NO    2. Would you like information on Advanced Directives?  NO

## 2018-02-26 NOTE — PATIENT INSTRUCTIONS
Complete prescribed course of antibiotics. Follow dose pack instructions  Cheratussin syrup, 5-10 mL (1-2 teaspoons) every 6 hours if needed for cough. Follow up for new symptoms, worsening symptoms or failure to improve.

## 2018-02-26 NOTE — PROGRESS NOTES
HISTORY OF PRESENT ILLNESS  Sang Waters is a 59 y.o. male. Cold Symptoms   The history is provided by the patient and medical records. This is a new problem. Episode onset: 5 days ago. Associated symptoms include chills, headaches (from coughing), myalgias, shortness of breath and wheezing. Pertinent negatives include no nausea and no vomiting. Patient Active Problem List   Diagnosis Code    Essential hypertension I10    Mixed hyperlipidemia E78.2    Mitral regurgitation I34.0    Aortic aneurysm I71.9    Pulmonary fibrosis J84.10    Vitamin D deficiency E55.9    Bilateral carpal tunnel syndrome G56.03    SMITH (dyspnea on exertion) R06.09    Ex-smoker Z87.891    [facility] J84.10    Coronary artery disease involving native coronary artery of native heart without angina pectoris I25.10    Obesity (BMI 30-39. 9) E66.9       Current Outpatient Prescriptions:     umeclidinium-vilanterol (ANORO ELLIPTA) 62.5-25 mcg/actuation inhaler, Take 1 Puff by inhalation daily. , Disp: 1 Inhaler, Rfl: 11    ketoconazole (NIZORAL) 2 % topical cream, Apply  to affected area two (2) times a day., Disp: 15 g, Rfl: 1    ergocalciferol (ERGOCALCIFEROL) 50,000 unit capsule, Take 1 Cap by mouth every seven (7) days. , Disp: 12 Cap, Rfl: 3    losartan (COZAAR) 100 mg tablet, take 1 tablet by mouth once daily, Disp: 90 Tab, Rfl: 3    metoprolol succinate (TOPROL-XL) 25 mg XL tablet, take 1 tablet by mouth once daily, Disp: 90 Tab, Rfl: 3    ezetimibe (ZETIA) 10 mg tablet, Take 1 Tab by mouth daily. , Disp: 90 Tab, Rfl: 3    atorvastatin (LIPITOR) 80 mg tablet, take 1 tablet by mouth once daily, Disp: 90 Tab, Rfl: 3    albuterol (PROVENTIL HFA, VENTOLIN HFA, PROAIR HFA) 90 mcg/actuation inhaler, Take 2 Puffs by inhalation every four (4) hours as needed. , Disp: , Rfl:     nitroglycerin (NITROSTAT) 0.4 mg SL tablet, 1 Tab by SubLINGual route every five (5) minutes as needed for Chest Pain., Disp: 25 Tab, Rfl: 3    ASPIRIN (ASPIR-81 PO), Take 162 mg by mouth daily. , Disp: , Rfl:     omega-3 fatty acids-vitamin e (FISH OIL) 1,000 mg cap, Take 4,000 mg by mouth daily. , Disp: , Rfl:     Allergies   Allergen Reactions    Brilinta [Ticagrelor] Other (comments)     dyspnea    Sildenafil Other (comments)     Flushing    Vardenafil Other (comments)     Flushing         Review of Systems   Constitutional: Positive for chills and fever (99 this AM). HENT: Positive for congestion (nasal congestion and runny nose). Respiratory: Positive for cough, sputum production (clear - yellow mucous), shortness of breath and wheezing. Gastrointestinal: Negative for diarrhea, nausea and vomiting. Musculoskeletal: Positive for myalgias. Neurological: Positive for headaches (from coughing). Visit Vitals    /70 (BP 1 Location: Left arm, BP Patient Position: Sitting)    Pulse 67    Temp 98.7 °F (37.1 °C) (Oral)    Resp 22    Ht 6' 1\" (1.854 m)    Wt 265 lb (120.2 kg)    SpO2 98%    BMI 34.96 kg/m2       Physical Exam   Constitutional: He is oriented to person, place, and time. He appears well-developed and well-nourished. HENT:   Head: Normocephalic. Right Ear: Tympanic membrane and ear canal normal.   Left Ear: Tympanic membrane and ear canal normal.   Mouth/Throat: Oropharynx is clear and moist.   Eyes: Conjunctivae and EOM are normal.   Neck: Neck supple. Cardiovascular: Normal rate, regular rhythm and normal heart sounds. Pulmonary/Chest: Effort normal. He has wheezes (diffuse inspiratory and expiratory , improved but not resolved after an albuterol nebulizer treatment). Lymphadenopathy:     He has no cervical adenopathy. Neurological: He is alert and oriented to person, place, and time. Skin: Skin is warm and dry. Psychiatric: He has a normal mood and affect. His behavior is normal.   Nursing note and vitals reviewed.     Viv rapid flu negative  ASSESSMENT and PLAN    ICD-10-CM ICD-9-CM    1. Acute respiratory infection J22 519.8 azithromycin (ZITHROMAX) 250 mg tablet      guaiFENesin-codeine (ROBITUSSIN AC) 100-10 mg/5 mL solution   2. Wheezing R06.2 786.07 AMB POC CLARISA INFLUENZA A/B TEST      albuterol (PROVENTIL VENTOLIN) 2.5 mg /3 mL (0.083 %) nebulizer solution      ALBUTEROL, INHAL. SOL., FDA-APPROVED FINAL, NON-COMPOUND UNIT DOSE, 1 MG      INHAL RX, AIRWAY OBST/DX SPUTUM INDUCT      predniSONE (STERAPRED DS) 10 mg dose pack   Complete prescribed course of antibiotics. Follow dose pack instructions  Cheratussin syrup, 5-10 mL (1-2 teaspoons) every 6 hours if needed for cough. Follow up for new symptoms, worsening symptoms or failure to improve.

## 2018-03-08 ENCOUNTER — TELEPHONE (OUTPATIENT)
Dept: FAMILY MEDICINE CLINIC | Age: 58
End: 2018-03-08

## 2018-03-08 DIAGNOSIS — Z13.1 SCREENING FOR DIABETES MELLITUS: ICD-10-CM

## 2018-03-08 DIAGNOSIS — Z13.0 SCREENING, ANEMIA, DEFICIENCY, IRON: ICD-10-CM

## 2018-03-08 DIAGNOSIS — I10 ESSENTIAL HYPERTENSION: Primary | ICD-10-CM

## 2018-03-08 DIAGNOSIS — E78.2 MIXED HYPERLIPIDEMIA: ICD-10-CM

## 2018-03-08 DIAGNOSIS — Z12.5 SCREENING FOR PROSTATE CANCER: ICD-10-CM

## 2018-03-08 DIAGNOSIS — E55.9 VITAMIN D DEFICIENCY: ICD-10-CM

## 2018-03-08 NOTE — TELEPHONE ENCOUNTER
Patient would like labs to be ordered so that he can come in and have them drawn them tomorrow 3/9/2018.

## 2018-03-09 LAB
25(OH)D3 SERPL-MCNC: 43.6 NG/ML (ref 32–100)
A-G RATIO,AGRAT: 1.4 RATIO (ref 1.1–2.6)
ABSOLUTE LYMPHOCYTE COUNT, 10803: 3.3 K/UL (ref 1–4.8)
ALBUMIN SERPL-MCNC: 4.4 G/DL (ref 3.5–5)
ALP SERPL-CCNC: 81 U/L (ref 25–115)
ALT SERPL-CCNC: 42 U/L (ref 5–40)
ANION GAP SERPL CALC-SCNC: 18 MMOL/L
AST SERPL W P-5'-P-CCNC: 19 U/L (ref 10–37)
AVG GLU, 10930: 123 MG/DL (ref 91–123)
BASOPHILS # BLD: 0 K/UL (ref 0–0.2)
BASOPHILS NFR BLD: 0 % (ref 0–2)
BILIRUB SERPL-MCNC: 0.6 MG/DL (ref 0.2–1.2)
BILIRUB UR QL: NEGATIVE
BUN SERPL-MCNC: 18 MG/DL (ref 6–22)
CALCIUM SERPL-MCNC: 9.2 MG/DL (ref 8.4–10.4)
CHLORIDE SERPL-SCNC: 100 MMOL/L (ref 98–110)
CHOLEST SERPL-MCNC: 140 MG/DL (ref 110–200)
CO2 SERPL-SCNC: 24 MMOL/L (ref 20–32)
CREAT SERPL-MCNC: 0.7 MG/DL (ref 0.5–1.2)
EOSINOPHIL # BLD: 0.1 K/UL (ref 0–0.5)
EOSINOPHIL NFR BLD: 1 % (ref 0–6)
ERYTHROCYTE [DISTWIDTH] IN BLOOD BY AUTOMATED COUNT: 12.9 % (ref 10–16)
GFRAA, 66117: >60
GFRNA, 66118: >60
GLOBULIN,GLOB: 3.2 G/DL (ref 2–4)
GLUCOSE SERPL-MCNC: 113 MG/DL (ref 70–99)
GLUCOSE UR QL: NEGATIVE MG/DL
GRANULOCYTES,GRANS: 49 % (ref 40–75)
HBA1C MFR BLD HPLC: 5.9 % (ref 4.8–5.9)
HCT VFR BLD AUTO: 44.8 % (ref 39.3–51.6)
HDLC SERPL-MCNC: 2.9 MG/DL (ref 0–5)
HDLC SERPL-MCNC: 48 MG/DL (ref 40–59)
HGB BLD-MCNC: 15.5 G/DL (ref 13.1–17.2)
HGB UR QL STRIP: NEGATIVE
KETONES UR QL STRIP.AUTO: NEGATIVE MG/DL
LDLC SERPL CALC-MCNC: 58 MG/DL (ref 50–99)
LEUKOCYTE ESTERASE: NEGATIVE
LYMPHOCYTES, LYMLT: 43 % (ref 27–45)
MCH RBC QN AUTO: 32 PG (ref 26–34)
MCHC RBC AUTO-ENTMCNC: 35 G/DL (ref 32–36)
MCV RBC AUTO: 92 FL (ref 80–95)
MONOCYTES # BLD: 0.5 K/UL (ref 0.1–0.9)
MONOCYTES NFR BLD: 6 % (ref 3–9)
NEUTROPHILS # BLD AUTO: 3.7 K/UL (ref 1.8–7.7)
NITRITE UR QL STRIP.AUTO: NEGATIVE
PH UR STRIP: 5 PH (ref 5–8)
PLATELET # BLD AUTO: 167 K/UL (ref 140–440)
PMV BLD AUTO: 9.1 FL (ref 6–10.8)
POTASSIUM SERPL-SCNC: 4.4 MMOL/L (ref 3.5–5.5)
PROT SERPL-MCNC: 7.6 G/DL (ref 6.4–8.3)
PROT UR QL STRIP: NEGATIVE MG/DL
PSA SERPL-MCNC: 0.64 NG/ML
RBC # BLD AUTO: 4.86 M/UL (ref 3.8–5.8)
RBC #/AREA URNS HPF: NORMAL /HPF
SODIUM SERPL-SCNC: 142 MMOL/L (ref 133–145)
SP GR UR: 1.02 (ref 1–1.03)
TRIGL SERPL-MCNC: 171 MG/DL (ref 40–149)
TSH SERPL DL<=0.005 MIU/L-ACNC: 1.51 MCU/ML (ref 0.27–4.2)
UROBILINOGEN UR STRIP-MCNC: <2 MG/DL
VLDLC SERPL CALC-MCNC: 34 MG/DL (ref 8–30)
WBC # BLD AUTO: 7.7 K/UL (ref 4–11)
WBC URNS QL MICRO: NORMAL /HPF (ref 0–2)

## 2018-03-16 ENCOUNTER — OFFICE VISIT (OUTPATIENT)
Dept: FAMILY MEDICINE CLINIC | Age: 58
End: 2018-03-16

## 2018-03-16 VITALS
BODY MASS INDEX: 35.28 KG/M2 | DIASTOLIC BLOOD PRESSURE: 82 MMHG | HEART RATE: 66 BPM | RESPIRATION RATE: 18 BRPM | OXYGEN SATURATION: 98 % | TEMPERATURE: 98.4 F | HEIGHT: 73 IN | SYSTOLIC BLOOD PRESSURE: 140 MMHG | WEIGHT: 266.2 LBS

## 2018-03-16 DIAGNOSIS — E66.9 OBESITY (BMI 30-39.9): ICD-10-CM

## 2018-03-16 DIAGNOSIS — Z00.00 ROUTINE GENERAL MEDICAL EXAMINATION AT A HEALTH CARE FACILITY: Primary | ICD-10-CM

## 2018-03-16 DIAGNOSIS — E78.2 MIXED HYPERLIPIDEMIA: ICD-10-CM

## 2018-03-16 DIAGNOSIS — E55.9 VITAMIN D DEFICIENCY: ICD-10-CM

## 2018-03-16 DIAGNOSIS — I71.20 THORACIC AORTIC ANEURYSM WITHOUT RUPTURE: ICD-10-CM

## 2018-03-16 DIAGNOSIS — R73.03 PREDIABETES: ICD-10-CM

## 2018-03-16 DIAGNOSIS — L40.9 PSORIASIS: ICD-10-CM

## 2018-03-16 DIAGNOSIS — I25.10 CORONARY ARTERY DISEASE INVOLVING NATIVE CORONARY ARTERY OF NATIVE HEART WITHOUT ANGINA PECTORIS: ICD-10-CM

## 2018-03-16 DIAGNOSIS — G56.03 BILATERAL CARPAL TUNNEL SYNDROME: ICD-10-CM

## 2018-03-16 DIAGNOSIS — J84.10 PULMONARY FIBROSIS (HCC): ICD-10-CM

## 2018-03-16 DIAGNOSIS — I10 ESSENTIAL HYPERTENSION: ICD-10-CM

## 2018-03-16 RX ORDER — TRIAMCINOLONE ACETONIDE 1 MG/G
CREAM TOPICAL
Qty: 28.4 G | Refills: 3 | Status: SHIPPED | OUTPATIENT
Start: 2018-03-16 | End: 2018-09-19 | Stop reason: ALTCHOICE

## 2018-03-16 NOTE — PROGRESS NOTES
HISTORY OF PRESENT ILLNESS  Shanell Small is a 59 y.o. male. Physical   Pertinent negatives include no abdominal pain.      Past Medical History:   Diagnosis Date    Aortic aneurysm     annulus 34 x 16mm,  SOV 39 x 39mm,  STJ 35 x 38mm,  MID ASC 39 x 38mm,  MAX DIAMETER (at level of RPA) 41 x 43mm,  PROX ARCH 37 x 37mm,  DIST ARCH 34 x 32mm,  DESC AO 28 x 28mm   (CTA 12/15)    Coronary artery disease     LAD - 4.0 x 18mm XIENCE 4/16, OM1 - 4.0 x 15mm XIENCE 4/16     Dyslipidemia     Hypertension     Mitral regurgitation     moderate (TTE 02/17),  moderate (ANITA 4/16)    Pulmonary fibrosis     upper lobes bilat (CTA 12/16)    Remote tobacco use     quit 2012     Past Surgical History:   Procedure Laterality Date    HX CERVICAL FUSION      cervical fusion    HX ORTHOPAEDIC      prior (L) knee surgery     Family History   Problem Relation Age of Onset    Post-op Nausea/Vomiting Mother     Cancer Mother      lung cancer     Heart Disease Father     Headache Father     Hypertension Maternal Grandmother     Heart Disease Maternal Grandmother     Cancer Paternal Grandmother      throat cancer     Diabetes Neg Hx     Stroke Neg Hx      History   Smoking Status    Former Smoker    Packs/day: 1.00    Years: 40.00    Types: Cigarettes    Quit date: 8/10/2013   Smokeless Tobacco    Never Used     Comment: 30 years smoking, stopped Oct. 2012     History   Alcohol Use    0.0 oz/week    0 Standard drinks or equivalent per week     Comment: beer 5-18 on weekends     Health Maintenance Review:  Immunization History   Administered Date(s) Administered    Influenza Vaccine (Quad) 10/21/2016    Influenza Vaccine PF 09/30/2013    Pneumococcal Conjugate (PCV-13) 08/18/2015    Pneumococcal Polysaccharide (PPSV-23) 02/10/2012    Pneumococcal Vaccine (Unspecified Type) 07/30/2013    Tdap 05/07/2010   Colonoscopy: 1/2010, normal  ?PSA - normal    Review of Systems   Constitutional: Negative for chills, fever and weight loss. HENT: Positive for hearing loss (wears hearing aids). Eyes: Negative for blurred vision and double vision. Corrective lenses, Eye exam including glaucoma 2/2018   Gastrointestinal: Negative for abdominal pain, constipation, diarrhea, heartburn, nausea and vomiting. Genitourinary: Negative for dysuria and urgency. Musculoskeletal: Negative for joint pain and myalgias. Skin: Negative for itching. Rash:     Endo/Heme/Allergies: Negative for environmental allergies. Psychiatric/Behavioral: Negative for depression. The patient is nervous/anxious (stable). The patient does not have insomnia. Visit Vitals    /82 (BP 1 Location: Left arm, BP Patient Position: Sitting)    Pulse 66    Temp 98.4 °F (36.9 °C) (Oral)    Resp 18    Ht 6' 1\" (1.854 m)    Wt 266 lb 3.2 oz (120.7 kg)    SpO2 98%    BMI 35.12 kg/m2       Physical Exam   HENT:   Head: Normocephalic. Right Ear: Tympanic membrane and ear canal normal.   Left Ear: Tympanic membrane and ear canal normal.   Mouth/Throat: Oropharynx is clear and moist.   Eyes: Conjunctivae and EOM are normal. Pupils are equal, round, and reactive to light. Neck: Neck supple. Pulmonary/Chest: Effort normal and breath sounds normal.   Abdominal: Soft. Bowel sounds are normal. There is no tenderness. Psychiatric: He has a normal mood and affect. His behavior is normal.   Nursing note and vitals reviewed. Patient Active Problem List   Diagnosis Code    Essential hypertension I10    Mixed hyperlipidemia E78.2    Mitral regurgitation I34.0    Aortic aneurysm I71.9    Pulmonary fibrosis J84.10    Vitamin D deficiency E55.9    Bilateral carpal tunnel syndrome G56.03    Ex-smoker Z87.891    Granulomatous lung disease ([facility] J84.10    Coronary artery disease involving native coronary artery of native heart without angina pectoris I25.10    Obesity (BMI 30-39. 9) E66.9       Current Outpatient Prescriptions:     triamcinolone acetonide (KENALOG) 0.1 % topical cream, Apply sparingly to affected areas twice daily, do not use for more than 14 days consecutively without a break, Disp: 28.4 g, Rfl: 3    umeclidinium-vilanterol (ANORO ELLIPTA) 62.5-25 mcg/actuation inhaler, Take 1 Puff by inhalation daily. , Disp: 1 Inhaler, Rfl: 11    ergocalciferol (ERGOCALCIFEROL) 50,000 unit capsule, Take 1 Cap by mouth every seven (7) days. , Disp: 12 Cap, Rfl: 3    losartan (COZAAR) 100 mg tablet, take 1 tablet by mouth once daily, Disp: 90 Tab, Rfl: 3    metoprolol succinate (TOPROL-XL) 25 mg XL tablet, take 1 tablet by mouth once daily, Disp: 90 Tab, Rfl: 3    ezetimibe (ZETIA) 10 mg tablet, Take 1 Tab by mouth daily. , Disp: 90 Tab, Rfl: 3    atorvastatin (LIPITOR) 80 mg tablet, take 1 tablet by mouth once daily, Disp: 90 Tab, Rfl: 3    albuterol (PROVENTIL HFA, VENTOLIN HFA, PROAIR HFA) 90 mcg/actuation inhaler, Take 2 Puffs by inhalation every four (4) hours as needed. , Disp: , Rfl:     nitroglycerin (NITROSTAT) 0.4 mg SL tablet, 1 Tab by SubLINGual route every five (5) minutes as needed for Chest Pain., Disp: 25 Tab, Rfl: 3    ASPIRIN (ASPIR-81 PO), Take 162 mg by mouth daily. , Disp: , Rfl:     omega-3 fatty acids-vitamin e (FISH OIL) 1,000 mg cap, Take 4,000 mg by mouth daily. , Disp: , Rfl:     CAD, Thoracic aortic aneurysm, mitral regurgitation followed by cardiology  Pulmonary fibrosis, granulomatous lung disease followed by pulmonary medicine    Annual CPE/Wellness Encounter Plan:  Anticipatory guidance and recommendations provided verbally and with printed information. Return for routine follow up in 1 year, sooner with any problems.   3/9/2018 11:53 PM - GuidoRiccardo In   Component Results   Component Value Flag Ref Range Units Status   Prostate Specific Ag 0.644  <=3.100 ng/mL Final       Problems Encounter:  HPI - CAD without exertional chest pain, Follow up hyperlipidemia, SMITH has progressed some, pulmonary medicine work up in progress, worsening numbness and tingling in hands and fingers-dropping things, rash on hands, fingers, elbows waxes and wanes-better when taking corticosteroids, no progress with weight loss-difficulty following diet and exercise recommendations, follow up Vitamin D deficiency, follow up prediabetes. ROS:  Respiratory: Positive for shortness of breath (persistent, a little worse - seeing pulmonologist). Negative for cough and wheezing. Cardiovascular: Negative for chest pain, palpitations and leg swelling. Reports Home BP consistently <135/85  Skin: Positive for rash (red lesions on hands, fingers, elbows - got better while taking Medrol dose pack for respiratory symptoms). Negative for itching. Neurological: Positive for tingling and sensory change (numbness in hands, has been dropping things - less symptomatic when wearing wrist splints at night but hasn't been wearing them). Negative for dizziness, focal weakness and headaches. Exam:   Constitutional: He is oriented to person, place, and time. He appears well-developed and well-nourished. Obese, weight stable   Cardiovascular: Normal rate, regular rhythm and intact distal pulses. No peripheral edema. Murmur (JANELL - LSB, left axilla) heard. Pulmonary/Chest: Effort normal and breath sounds normal.   Neurological: He is alert and oriented to person, place, and time. He has normal reflexes. Positive Tinel's sign at wrists bilaterally  Skin: Skin is warm and dry. Raised confluent erythematous plateau lesions, dorsal left hand and fingers, extensor surfaces of elbows  Results for Gaye York (MRN [id number]) as of 3/16/2018 16:36   Ref.  Range 10/13/2016 09:30 3/8/2017 09:06 8/29/2017 09:18 9/15/2017 13:18 3/9/2018 09:04   Triglyceride Latest Ref Range: 40 - 149 mg/dL 77 133 128  171 (H)   Cholesterol, total Latest Ref Range: 110 - 200 mg/dL 129 144 121  140   HDL Cholesterol Latest Ref Range: 40 - 59 mg/dL 39 (L) 46 42  48   LDL, calculated Latest Ref Range: 50 - 99 mg/dL 75 71 53  58   VLDL, calculated Latest Ref Range: 8 - 30 mg/dL 15 27 26  34 (H)   Hemoglobin A1c, (calculated) Latest Ref Range: 4.8 - 5.9 %  5.8   5.9   Hemoglobin A1c (POC) Latest Units: %    5.5      Results for Kate Means (MRN [id number]) as of 3/16/2018 16:36   Ref. Range 7/13/2016 08:45 10/13/2016 09:30 3/8/2017 09:06 8/29/2017 09:18 3/9/2018 09:04   VITAMIN D, 25-HYDROXY Latest Ref Range: 32.0 - 100.0 ng/mL 17.8 (L) 29.5 (L) 28.3 (L) 44.0 43.6     ASSESSMENT and PLAN    ICD-10-CM ICD-9-CM    1. Routine general medical examination at a health care facility Z00.00 V70.0    2. Coronary artery disease involving native coronary artery of native heart without angina pectoris I25.10 414.01    3. Essential hypertension I10 401.9    4. Mixed hyperlipidemia E78.2 272.2    5. Pulmonary fibrosis J84.10 515    6. Thoracic aortic aneurysm without rupture (HCC) I71.2 441.2    7. Vitamin D deficiency E55.9 268.9    8. Obesity (BMI 30-39. 9) E66.9 278.00    9. Bilateral carpal tunnel syndrome G56.03 354.0 REFERRAL TO ORTHOPEDIC SURGERY   10. Psoriasis L40.9 696.1 triamcinolone acetonide (KENALOG) 0.1 % topical cream   Continue current medications. Avoid dietary starch and sugar and follow a program of regular aerobic exercise. Verbal and written recommendations provided. Return for follow up in 6 months, sooner with any problems. Return for fasting lab a few days before the appointment. Apply triamcinolone cream sparingly to affected areas twice daily, do not use for more than 14 days consecutively without a break  Orthopaedic surgery referral-wear wrist splints nightly in the interim.

## 2018-03-16 NOTE — PROGRESS NOTES
Elodia Peabody is a 59 y.o. male here for physical       Elodia Peabody is a 59 y.o. male (: 1960) presenting to address:    Chief Complaint   Patient presents with    Physical     pt here for physical        Vitals:    18 1303   BP: 140/82   Pulse: 66   Resp: 18   SpO2: 98%   Weight: 266 lb 3.2 oz (120.7 kg)   Height: 6' 1\" (1.854 m)   PainSc:   0 - No pain       Hearing/Vision:   No exam data present    Learning Assessment:     Learning Assessment 2016   PRIMARY LEARNER Patient   HIGHEST LEVEL OF EDUCATION - PRIMARY LEARNER  -   BARRIERS PRIMARY LEARNER -   CO-LEARNER CAREGIVER -   PRIMARY LANGUAGE ENGLISH   LEARNER PREFERENCE PRIMARY READING   ANSWERED BY pt   RELATIONSHIP SELF     Depression Screening:     PHQ over the last two weeks 3/16/2018   Little interest or pleasure in doing things Not at all   Feeling down, depressed or hopeless Not at all   Total Score PHQ 2 0     Fall Risk Assessment:   No flowsheet data found. Abuse Screening:   No flowsheet data found. Coordination of Care Questionaire:   1. Have you been to the ER, urgent care clinic since your last visit? Hospitalized since your last visit? NO    2. Have you seen or consulted any other health care providers outside of the [address] [place] [address] since your last visit? Include any pap smears or colon screening. NO    Advanced Directive:   1. Do you have an Advanced Directive? NO    2. Would you like information on Advanced Directives?  NO

## 2018-03-16 NOTE — MR AVS SNAPSHOT
Clemens Brigitte 
 
 
 [address]  [address] [facility] [id number] [phone] Patient: Elodia Peabody MRN: [id number] DSC:[id number] Visit Information Date & Time Provider Department Dept. Phone Encounter #  
 3/16/2018  1:00 PM Shanti Carroll, [facility] [phone] [id number] Follow-up Instructions Return in about 6 months (around 9/16/2018), or 30 minute follow up appointment. Your Appointments 8/28/2018  1:30 PM  
Follow Up with Shorty Niño MD  
Cardio Specialist at [facility]) Appt Note: fu after echo and CT  
 [address] [facility] [address]  
  
   
 [id number] [place] [address] [address] Upcoming Health Maintenance Date Due FOBT Q 1 YEAR AGE 50-75 10/1/2008 DTaP/Tdap/Td series (2 - Td) 5/7/2020 Allergies as of 3/16/2018  Review Complete On: 3/16/2018 By: Shanti Carroll MD  
  
 Severity Noted Reaction Type Reactions Brilinta [Ticagrelor]    Other (comments) dyspnea Sildenafil Low 08/10/2016    Other (comments) [place] Vardenafil Low 08/10/2016    Other (comments) [place] Current Immunizations  Reviewed on 10/21/2016 Name Date Influenza Vaccine Mahogany Mendes) 10/21/2016 Influenza Vaccine PF 9/30/2013 Pneumococcal Conjugate (PCV-13) 8/18/2015  9:25 AM  
 Pneumococcal Polysaccharide (PPSV-23) 2/10/2012 Pneumococcal Vaccine (Unspecified Type) 7/30/2013 Tdap 5/7/2010 Not reviewed this visit You Were Diagnosed With   
  
 Codes Comments Routine general medical examination at a health care facility    -  Primary ICD-10-CM: Z00.00 ICD-9-CM: V70.0 Coronary artery disease involving native coronary artery of native heart without angina pectoris     ICD-10-CM: I25.10 ICD-9-CM: 414.01 Essential hypertension     ICD-10-CM: I10 
ICD-9-CM: 401.9 Mixed hyperlipidemia     ICD-10-CM: E78.2 ICD-9-CM: 272.2 Pulmonary fibrosis ([address]     ICD-10-CM: J84.10 ICD-9-CM: 098 Thoracic aortic aneurysm without rupture (HCC)     ICD-10-CM: I71.2 ICD-9-CM: 551. 2 Vitamin D deficiency     ICD-10-CM: E55.9 ICD-9-CM: 268.9 Obesity (BMI 30-39. 9)     ICD-10-CM: E66.9 ICD-9-CM: 278.00 Bilateral carpal tunnel syndrome     ICD-10-CM: G56.03 
ICD-9-CM: 354.0 Psoriasis     ICD-10-CM: L40.9 ICD-9-CM: 696.1 Vitals BP Pulse Temp Resp Height(growth percentile) Weight(growth percentile) 140/82 (BP 1 Location: Left arm, BP Patient Position: Sitting) 66 98.4 °F (36.9 °C) (Oral) 18 6' 1\" (1.854 m) 266 lb 3.2 oz (120.7 kg) SpO2 BMI Smoking Status 98% 35.12 kg/m2 Former Smoker Vitals History BMI and BSA Data Body Mass Index Body Surface Area  
 35.12 kg/m 2 2.49 m 2 Preferred Pharmacy Pharmacy Name Phone [phone] [place] [phone] Your Updated Medication List  
  
   
This list is accurate as of 3/16/18  1:37 PM.  Always use your most recent med list.  
  
  
  
  
 albuterol 90 mcg/actuation inhaler Commonly known as:  PROVENTIL HFA, VENTOLIN HFA, PROAIR HFA Take 2 Puffs by inhalation every four (4) hours as needed. ASPIR-81 PO Take 162 mg by mouth daily. atorvastatin 80 mg tablet Commonly known as:  LIPITOR  
take 1 tablet by mouth once daily  
  
 ergocalciferol 50,000 unit capsule Commonly known as:  ERGOCALCIFEROL Take 1 Cap by mouth every seven (7) days. ezetimibe 10 mg tablet Commonly known as:  Yohannes Mediate Take 1 Tab by mouth daily. FISH OIL 1,000 mg Cap Generic drug:  omega-3 fatty acids-vitamin e Take 4,000 mg by mouth daily. losartan 100 mg tablet Commonly known as:  COZAAR  
take 1 tablet by mouth once daily  
  
 metoprolol succinate 25 mg XL tablet Commonly known as:  TOPROL-XL  
 take 1 tablet by mouth once daily  
  
 nitroglycerin 0.4 mg SL tablet Commonly known as:  NITROSTAT  
1 Tab by SubLINGual route every five (5) minutes as needed for Chest Pain.  
  
 triamcinolone acetonide 0.1 % topical cream  
Commonly known as:  KENALOG Apply sparingly to affected areas twice daily, do not use for more than 14 days consecutively without a break  
  
 umeclidinium-vilanterol 62.5-25 mcg/actuation inhaler Commonly known as:  Dorcus Marbury Take 1 Puff by inhalation daily. Prescriptions Sent to Pharmacy Refills  
 triamcinolone acetonide (KENALOG) 0.1 % topical cream 3 Sig: Apply sparingly to affected areas twice daily, do not use for more than 14 days consecutively without a break Class: Normal  
 Pharmacy: [address] #: [phone] We Performed the Following REFERRAL TO ORTHOPEDIC SURGERY [REF62 Custom] Comments:  
 Please evaluate patient for worsening carpal tunnel symptoms Follow-up Instructions Return in about 6 months (around 9/16/2018), or 30 minute follow up appointment. Referral Information Referral ID Referred By Referred To  
  
 [id number] Hayden Rizvi MD   
   [place] [address] [address] [place] [address] Phone: [phone] Fax: [phone] Visits Status Start Date End Date 1 New Request 3/16/18 3/16/19 If your referral has a status of pending review or denied, additional information will be sent to support the outcome of this decision. Patient Instructions Continue current medications. Avoid dietary starch and sugar and follow a program of regular aerobic exercise. Verbal and written recommendations provided. Return for follow up in 6 months, sooner with any problems. Return for fasting lab a few days before the appointment. Apply triamcinolone cream sparingly to affected areas twice daily, do not use for more than 14 days consecutively without a break Orthopaedic surgery referral-wear wrist splints nightly in the interim. Well Visit, Men 48 to 72: Care Instructions Your Care Instructions Physical exams can help you stay healthy. Your doctor has checked your overall health and may have suggested ways to take good care of yourself. He or she also may have recommended tests. At home, you can help prevent illness with healthy eating, regular exercise, and other steps. Follow-up care is a key part of your treatment and safety. Be sure to make and go to all appointments, and call your doctor if you are having problems. It's also a good idea to know your test results and keep a list of the medicines you take. How can you care for yourself at home? · Reach and stay at a healthy weight. This will lower your risk for many problems, such as obesity, diabetes, heart disease, and high blood pressure. · Get at least 30 minutes of exercise on most days of the week. Walking is a good choice. You also may want to do other activities, such as running, swimming, cycling, or playing tennis or team sports. · Do not smoke. Smoking can make health problems worse. If you need help quitting, talk to your doctor about stop-smoking programs and medicines. These can increase your chances of quitting for good. · Protect your skin from too much sun. When you're outdoors from 10 a.m. to 4 p.m., stay in the shade or cover up with clothing and a hat with a wide brim. Wear sunglasses that block UV rays. Even when it's cloudy, put broad-spectrum sunscreen (SPF 30 or higher) on any exposed skin. · See a dentist one or two times a year for checkups and to have your teeth cleaned. · Wear a seat belt in the car. · Limit alcohol to 2 drinks a day. Too much alcohol can cause health problems. Follow your doctor's advice about when to have certain tests. These tests can spot problems early. · Cholesterol. Your doctor will tell you how often to have this done based on your overall health and other things that can increase your risk for heart attack and stroke. · Blood pressure. Have your blood pressure checked during a routine doctor visit. Your doctor will tell you how often to check your blood pressure based on your age, your blood pressure results, and other factors. · Prostate exam. Talk to your doctor about whether you should have a blood test (called a PSA test) for prostate cancer. Experts disagree on whether men should have this test. Some experts recommend that you discuss the benefits and risks of the test with your doctor. · Diabetes. Ask your doctor whether you should have tests for diabetes. · Vision. Some experts recommend that you have yearly exams for glaucoma and other age-related eye problems starting at age 48. · Hearing. Tell your doctor if you notice any change in your hearing. You can have tests to find out how well you hear. · Colon cancer. You should begin tests for colon cancer at age 48. You may have one of several tests. Your doctor will tell you how often to have tests based on your age and risk. Risks include whether you already had a precancerous polyp removed from your colon or whether your parent, brother, sister, or child has had colon cancer. · Heart attack and stroke risk. At least every 4 to 6 years, you should have your risk for heart attack and stroke assessed. Your doctor uses factors such as your age, blood pressure, cholesterol, and whether you smoke or have diabetes to show what your risk for a heart attack or stroke is over the next 10 years. · Abdominal aortic aneurysm. Ask your doctor whether you should have a test to check for an aneurysm. You may need a test if you ever smoked or if your parent, brother, sister, or child has had an aneurysm. When should you call for help? Watch closely for changes in your health, and be sure to contact your doctor if you have any problems or symptoms that concern you. Where can you learn more? Go to http://delon-timur.info/. Enter X722 in the search box to learn more about \"Well Visit, Men 48 to 72: Care Instructions. \" Current as of: May 12, 2017 Content Version: 11.4 © 5368-8006 Browserling. Care instructions adapted under license by Transparency Software (which disclaims liability or warranty for this information). If you have questions about a medical condition or this instruction, always ask your healthcare professional. [place] [address] any warranty or liability for your use of this information. Learning About Low-Carbohydrate Diets for Weight Loss What is a low-carbohydrate diet? Low-carb diets avoid foods that are high in carbohydrate. These high-carb foods include pasta, bread, rice, cereal, fruits, and starchy vegetables. Instead, these diets usually have you eat foods that are high in fat and protein. Many people lose weight quickly on a low-carb diet. But the early weight loss is water. People on this diet often gain the weight back after they start eating carbs again. Not all diet plans are safe or work well. A lot of the evidence shows that low-carb diets aren't healthy. That's because these diets often don't include healthy foods like fruits and vegetables. Losing weight safely means balancing protein, fat, and carbs with every meal and snack. And low-carb diets don't always provide the vitamins, minerals, and fiber you need. If you have a serious medical condition, talk to your doctor before you try any diet. These conditions include kidney disease, heart disease, type 2 diabetes, high cholesterol, and high blood pressure. If you are pregnant, it may not be safe for your baby if you are on a low-carb diet. How can you lose weight safely? You might have heard that a diet plan helped another person lose weight. But that doesn't mean that it will work for you. It is very hard to stay on a diet that includes lots of big changes in your eating habits. If you want to get to a healthy weight and stay there, making healthy lifestyle changes will often work better than dieting. These steps can help. · Make a plan for change. Work with your doctor to create a plan that is right for you. · See a dietitian. He or she can show you how to make healthy changes in your eating habits. · Manage stress. If you have a lot of stress in your life, it can be hard to focus on making healthy changes to your daily habits. · Track your food and activity. You are likely to do better at losing weight if you keep track of what you eat and what you do. Follow-up care is a key part of your treatment and safety. Be sure to make and go to all appointments, and call your doctor if you are having problems. It's also a good idea to know your test results and keep a list of the medicines you take. Where can you learn more? Go to http://delon-timur.info/. Enter A121 in the search box to learn more about \"Learning About Low-Carbohydrate Diets for Weight Loss. \" Current as of: May 12, 2017 Content Version: 11.4 © 3785-9629 Healthwise, Incorporated. Care instructions adapted under license by DailyBurn (which disclaims liability or warranty for this information). If you have questions about a medical condition or this instruction, always ask your healthcare professional. [place] [address] any warranty or liability for your use of this information. Low Sodium Diet (2,000 Milligram): Care Instructions Your Care Instructions Too much sodium causes your body to hold on to extra water. This can raise your blood pressure and force your heart and kidneys to work harder.  In very serious cases, this could cause you to be put in the hospital. It might even be life-threatening. By limiting sodium, you will feel better and lower your risk of serious problems. The most common source of sodium is salt. People get most of the salt in their diet from canned, prepared, and packaged foods. Fast food and restaurant meals also are very high in sodium. Your doctor will probably limit your sodium to less than 2,000 milligrams (mg) a day. This limit counts all the sodium in prepared and packaged foods and any salt you add to your food. Follow-up care is a key part of your treatment and safety. Be sure to make and go to all appointments, and call your doctor if you are having problems. It's also a good idea to know your test results and keep a list of the medicines you take. How can you care for yourself at home? Read food labels · Read labels on cans and food packages. The labels tell you how much sodium is in each serving. Make sure that you look at the serving size. If you eat more than the serving size, you have eaten more sodium. · Food labels also tell you the Percent Daily Value for sodium. Choose products with low Percent Daily Values for sodium. · Be aware that sodium can come in forms other than salt, including monosodium glutamate (MSG), sodium citrate, and sodium bicarbonate (baking soda). MSG is often added to Asian food. When you eat out, you can sometimes ask for food without MSG or added salt. Buy low-sodium foods · Buy foods that are labeled \"unsalted\" (no salt added), \"sodium-free\" (less than 5 mg of sodium per serving), or \"low-sodium\" (less than 140 mg of sodium per serving). Foods labeled \"reduced-sodium\" and \"light sodium\" may still have too much sodium. Be sure to read the label to see how much sodium you are getting. · Buy fresh vegetables, or frozen vegetables without added sauces. Buy low-sodium versions of canned vegetables, soups, and other canned goods. Prepare low-sodium meals · Cut back on the amount of salt you use in cooking. This will help you adjust to the taste. Do not add salt after cooking. One teaspoon of salt has about 2,300 mg of sodium. · Take the salt shaker off the table. · Flavor your food with garlic, lemon juice, onion, vinegar, herbs, and spices. Do not use soy sauce, lite soy sauce, steak sauce, onion salt, garlic salt, celery salt, mustard, or ketchup on your food. · Use low-sodium salad dressings, sauces, and ketchup. Or make your own salad dressings and sauces without adding salt. · Use less salt (or none) when recipes call for it. You can often use half the salt a recipe calls for without losing flavor. Other foods such as rice, pasta, and grains do not need added salt. · Rinse canned vegetables, and cook them in fresh water. This removes some-but not all-of the salt. · Avoid water that is naturally high in sodium or that has been treated with water softeners, which add sodium. Call your local water company to find out the sodium content of your water supply. If you buy bottled water, read the label and choose a sodium-free brand. Avoid high-sodium foods · Avoid eating: ¨ Smoked, cured, salted, and canned meat, fish, and poultry. ¨ Ham, alberto, hot dogs, and luncheon meats. ¨ Regular, hard, and processed cheese and regular peanut butter. ¨ Crackers with salted tops, and other salted snack foods such as pretzels, chips, and salted popcorn. ¨ Frozen prepared meals, unless labeled low-sodium. ¨ Canned and dried soups, broths, and bouillon, unless labeled sodium-free or low-sodium. ¨ Canned vegetables, unless labeled sodium-free or low-sodium. ¨ Western Renetta fries, pizza, tacos, and other fast foods. ¨ Pickles, olives, ketchup, and other condiments, especially soy sauce, unless labeled sodium-free or low-sodium. Where can you learn more? Go to http://jose ramon.info/. Enter Z736 in the search box to learn more about \"Low Sodium Diet (2,000 Milligram): Care Instructions. \" Current as of: May 12, 2017 Content Version: 11.4 © 4007-6474 Ruck.us. Care instructions adapted under license by 25eight (which disclaims liability or warranty for this information). If you have questions about a medical condition or this instruction, always ask your healthcare professional. [address] any warranty or liability for your use of this information. Carpal Tunnel Syndrome: Exercises Your Care Instructions Here are some examples of typical rehabilitation exercises for your condition. Start each exercise slowly. Ease off the exercise if you start to have pain. Your doctor or your physical or occupational therapist will tell you when you can start these exercises and which ones will work best for you. Warm-up stretches When you no longer have pain or numbness, you can do exercises to help prevent carpal tunnel syndrome from coming back. Do not do any stretch or movement that is uncomfortable or painful. 1. Rotate your wrist up, down, and from side to side. Repeat 4 times. 2. Stretch your fingers far apart. Relax them, and then stretch them again. Repeat 4 times. 3. Stretch your thumb by pulling it back gently, holding it, and then releasing it. Repeat 4 times. How to do the exercises Prayer stretch 1. Start with your palms together in front of your chest just below your chin. 2. Slowly lower your hands toward your waistline, keeping your hands close to your stomach and your palms together until you feel a mild to moderate stretch under your forearms. 3. Hold for at least 15 to 30 seconds. Repeat 2 to 4 times. Wrist flexor stretch 1. Extend your arm in front of you with your palm up. 2. Bend your wrist, pointing your hand toward the floor.  
3. With your other hand, gently bend your wrist farther until you feel a mild to moderate stretch in your forearm. 4. Hold for at least 15 to 30 seconds. Repeat 2 to 4 times. Wrist extensor stretch 1. Repeat steps 1 through 4 of the stretch above, but begin with your extended hand palm down. Follow-up care is a key part of your treatment and safety. Be sure to make and go to all appointments, and call your doctor if you are having problems. It's also a good idea to know your test results and keep a list of the medicines you take. Where can you learn more? Go to http://delon-timur.info/. Enter K623 in the search box to learn more about \"Carpal Tunnel Syndrome: Exercises. \" Current as of: March 21, 2017 Content Version: 11.4 © 0038-3576 Obvious. Care instructions adapted under license by Talkable (which disclaims liability or warranty for this information). If you have questions about a medical condition or this instruction, always ask your healthcare professional. [place] [address] any warranty or liability for your use of this information. Carpal Tunnel Release: Before Your Surgery What is carpal tunnel release? Carpal tunnel release is surgery that reduces the pressure on a nerve in the wrist. Your doctor will cut a ligament that presses on the nerve. This lets the nerve pass freely through the tunnel without being squeezed. The surgery can be open or endoscopic. In open surgery, your doctor makes a small cut in the palm of your hand. This cut is called an incision. In endoscopic surgery, your doctor makes one small incision in the wrist, or one small incision in the wrist and one in the palm. Your doctor puts a thin tube with a camera attached (endoscope) into the incision. Surgical tools are put in along with the endoscope. In both types of surgeries, the incisions are closed with stitches. The incisions leave scars that usually fade in time. You may be asleep during the surgery. Or you may be awake and have medicine to numb your hand and arm so you will not feel pain. After surgery, your wrist and hand pain should begin to go away. It usually takes 3 to 4 months to recover and 1 year before your hand strength returns. How much hand strength returns is different for each person. You will go home the same day as the surgery. When you can return to work depends on the type of work you do. Follow-up care is a key part of your treatment and safety. Be sure to make and go to all appointments, and call your doctor if you are having problems. It's also a good idea to know your test results and keep a list of the medicines you take. What happens before surgery? ?Surgery can be stressful. This information will help you understand what you can expect. And it will help you safely prepare for surgery. ? Preparing for surgery ? · Understand exactly what surgery is planned, along with the risks, benefits, and other options. · Tell your doctors ALL the medicines, vitamins, supplements, and herbal remedies you take. Some of these can increase the risk of bleeding or interact with anesthesia. ? · If you take blood thinners, such as warfarin (Coumadin), clopidogrel (Plavix), or aspirin, be sure to talk to your doctor. He or she will tell you if you should stop taking these medicines before your surgery. Make sure that you understand exactly what your doctor wants you to do.  
? · Your doctor will tell you which medicines to take or stop before your surgery. You may need to stop taking certain medicines a week or more before surgery. So talk to your doctor as soon as you can.  
? · If you have an advance directive, let your doctor know. It may include a living will and a durable power of  for health care. Bring a copy to the hospital. If you don't have one, you may want to prepare one. It lets your doctor and loved ones know your health care wishes. Doctors advise that everyone prepare these papers before any type of surgery or procedure. What happens on the day of surgery? · Follow the instructions exactly about when to stop eating and drinking. If you don't, your surgery may be canceled. If your doctor told you to take your medicines on the day of surgery, take them with only a sip of water. ? · Take a bath or shower before you come in for your surgery. Do not apply lotions, perfumes, deodorants, or nail polish. ? · Do not shave the surgical site yourself. ? · Take off all jewelry and piercings. And take out contact lenses, if you wear them. ? At the hospital or surgery center · Bring a picture ID. ? · The area for surgery is often marked to make sure there are no errors. ? · You will be kept comfortable and safe by your anesthesia provider. The anesthesia may make you sleep. Or it may just numb the area being worked on. ? · The surgery will take about 15 to 60 minutes. Going home · Be sure you have someone to drive you home. Anesthesia and pain medicine make it unsafe for you to drive. ? · You will be given more specific instructions about recovering from your surgery. They will cover things like diet, wound care, follow-up care, driving, and getting back to your normal routine. When should you call your doctor? · You have questions or concerns. ? · You don't understand how to prepare for your surgery. ? · You become ill before the surgery (such as fever, flu, or a cold). ? · You need to reschedule or have changed your mind about having the surgery. Where can you learn more? Go to http://delon-timur.info/. Enter G126 in the search box to learn more about \"Carpal Tunnel Release: Before Your Surgery. \" Current as of: March 21, 2017 Content Version: 11.4 © 7058-9692 [employer].  Care instructions adapted under license by [address] (which disclaims liability or warranty for this information). If you have questions about a medical condition or this instruction, always ask your healthcare professional. [address] any warranty or liability for your use of this information. Psoriasis: Care Instructions Your Care Instructions Psoriasis (say \"xhr-UF-ut-cipriano\") is a long-term skin problem that causes thick, white, silvery, or red patches on the skin. The patches may be small or large, and they occur most often on the knees, elbows, scalp, hands, feet, or lower back. The skin may be scaly. If the condition is severe, your skin can become itchy and tender. Psoriasis also can be embarrassing if the patches are on visible areas. You can treat psoriasis with good care at home and with medicine from your doctor. You may put medicine on your skin and take pills or have shots to stop the redness and swelling. Your doctor also may suggest ultraviolet light treatments. Follow-up care is a key part of your treatment and safety. Be sure to make and go to all appointments, and call your doctor if you are having problems. It's also a good idea to know your test results and keep a list of the medicines you take. How can you care for yourself at home? · If your doctor prescribes medicine, use it exactly as prescribed. Follow your doctor's advice for sunlight or ultraviolet light treatment. Call your doctor if you think you are having a problem with your medicine. · Protect your skin: ¨ Keep your skin moist. After bathing, put an ointment, cream, or lotion on your skin while it is still damp. This seals in moisture. Use over-the-counter products that your doctor suggests. These may include Cetaphil, Lubriderm, or Eucerin. Petroleum jelly (such as Vaseline) and vegetable shortening (such as Crisco) also work.  
¨ If you have psoriasis on your scalp, use a shampoo with salicylic acid, such as Neutrogena T/Sal. 
¨ Avoid harsh skin products, such as those that contain alcohol. ¨ Cover your skin in cold weather. ¨ Try to prevent sunburn. Although short periods of sun exposure reduce psoriasis in most people, too much sun can damage the skin and cause skin cancer. In addition, sunburns can trigger psoriasis. Use sunscreen on areas of your skin that do not have psoriasis. Make sure to use a broad-spectrum sunscreen that has a sun protection factor (SPF) of 30 or higher. Use it every day, even when it is cloudy. ¨ Take care to avoid accidents such as cutting or scraping your skin. An injury to the skin can cause psoriasis patches to form anywhere on the body, including the area of the injury. ¨ Avoid tight shoes, clothing, watchbands, and hats. These may irritate your skin. ¨ Use a vaporizer or humidifier to add moisture to your bedroom. Follow the directions for cleaning the machine. · Try making one or more changes to your daily habits to help with managing your psoriasis. For example: ¨ Try to control stress and anxiety. They may cause psoriasis to appear suddenly or can make symptoms worse. ¨ If you smoke, think about quitting. If you need help quitting, talk to your doctor about stop-smoking programs and medicines. ¨ If you drink, limit or reduce the amount of alcohol you drink. ¨ If you are overweight, see if you can lose some weight. · Seek support from family and friends. Talk to a counselor or other professional if you feel sad about your condition and need more help. When should you call for help? Call your doctor now or seek immediate medical care if: 
? · You have signs of infection, such as: 
¨ Increased pain, swelling, warmth, or redness. ¨ Red streaks leading from the area. ¨ Pus draining from the area. ¨ A fever. ? Watch closely for changes in your health, and be sure to contact your doctor if: 
? · You have swelling, stiffness, or pain in your joints. ? · You do not get better as expected. Where can you learn more? Go to http://delon-timur.info/. Enter B039 in the search box to learn more about \"Psoriasis: Care Instructions. \" Current as of: October 13, 2016 Content Version: 11.4 © 4514-7399 [employer]. Care instructions adapted under license by [employer] (which disclaims liability or warranty for this information). If you have questions about a medical condition or this instruction, always ask your healthcare professional. [address] any warranty or liability for your use of this information. Introducing [facility]! Dear Nabila Stone: Thank you for requesting a [employer] account. Our records indicate that you already have an active [employer] account. You can access your account anytime at [email or web address] Did you know that you can access your hospital and ER discharge instructions at any time in [employer]? You can also review all of your test results from your hospital stay or ER visit. Additional Information If you have questions, please visit the Frequently Asked Questions section of the [employer] website at [email or web address] Remember, [employer] is NOT to be used for urgent needs. For medical emergencies, dial 911. Now available from your iPhone and Android! Please provide this summary of care documentation to your next provider. Your primary care clinician is listed as Taj Garza. If you have any questions after today's visit, please call [phone].

## 2018-03-16 NOTE — PATIENT INSTRUCTIONS
Continue current medications. Avoid dietary starch and sugar and follow a program of regular aerobic exercise. Verbal and written recommendations provided. Return for follow up in 6 months, sooner with any problems. Return for fasting lab a few days before the appointment. Apply triamcinolone cream sparingly to affected areas twice daily, do not use for more than 14 days consecutively without a break  Orthopaedic surgery referral-wear wrist splints nightly in the interim. Well Visit, Men 48 to 72: Care Instructions  Your Care Instructions    Physical exams can help you stay healthy. Your doctor has checked your overall health and may have suggested ways to take good care of yourself. He or she also may have recommended tests. At home, you can help prevent illness with healthy eating, regular exercise, and other steps. Follow-up care is a key part of your treatment and safety. Be sure to make and go to all appointments, and call your doctor if you are having problems. It's also a good idea to know your test results and keep a list of the medicines you take. How can you care for yourself at home? · Reach and stay at a healthy weight. This will lower your risk for many problems, such as obesity, diabetes, heart disease, and high blood pressure. · Get at least 30 minutes of exercise on most days of the week. Walking is a good choice. You also may want to do other activities, such as running, swimming, cycling, or playing tennis or team sports. · Do not smoke. Smoking can make health problems worse. If you need help quitting, talk to your doctor about stop-smoking programs and medicines. These can increase your chances of quitting for good. · Protect your skin from too much sun. When you're outdoors from 10 a.m. to 4 p.m., stay in the shade or cover up with clothing and a hat with a wide brim. Wear sunglasses that block UV rays.  Even when it's cloudy, put broad-spectrum sunscreen (SPF 30 or higher) on any exposed skin. · See a dentist one or two times a year for checkups and to have your teeth cleaned. · Wear a seat belt in the car. · Limit alcohol to 2 drinks a day. Too much alcohol can cause health problems. Follow your doctor's advice about when to have certain tests. These tests can spot problems early. · Cholesterol. Your doctor will tell you how often to have this done based on your overall health and other things that can increase your risk for heart attack and stroke. · Blood pressure. Have your blood pressure checked during a routine doctor visit. Your doctor will tell you how often to check your blood pressure based on your age, your blood pressure results, and other factors. · Prostate exam. Talk to your doctor about whether you should have a blood test (called a PSA test) for prostate cancer. Experts disagree on whether men should have this test. Some experts recommend that you discuss the benefits and risks of the test with your doctor. · Diabetes. Ask your doctor whether you should have tests for diabetes. · Vision. Some experts recommend that you have yearly exams for glaucoma and other age-related eye problems starting at age 48. · Hearing. Tell your doctor if you notice any change in your hearing. You can have tests to find out how well you hear. · Colon cancer. You should begin tests for colon cancer at age 48. You may have one of several tests. Your doctor will tell you how often to have tests based on your age and risk. Risks include whether you already had a precancerous polyp removed from your colon or whether your parent, brother, sister, or child has had colon cancer. · Heart attack and stroke risk. At least every 4 to 6 years, you should have your risk for heart attack and stroke assessed.  Your doctor uses factors such as your age, blood pressure, cholesterol, and whether you smoke or have diabetes to show what your risk for a heart attack or stroke is over the next 10 years.  · Abdominal aortic aneurysm. Ask your doctor whether you should have a test to check for an aneurysm. You may need a test if you ever smoked or if your parent, brother, sister, or child has had an aneurysm. When should you call for help? Watch closely for changes in your health, and be sure to contact your doctor if you have any problems or symptoms that concern you. Where can you learn more? Go to http://delon-timur.info/. Enter K568 in the search box to learn more about \"Well Visit, Men 48 to 72: Care Instructions. \"  Current as of: May 12, 2017  Content Version: 11.4  © 3577-5172 [employer]. Care instructions adapted under license by [employer] (which disclaims liability or warranty for this information). If you have questions about a medical condition or this instruction, always ask your healthcare professional. [facility] any warranty or liability for your use of this information. Learning About Low-Carbohydrate Diets for Weight Loss  What is a low-carbohydrate diet? Low-carb diets avoid foods that are high in carbohydrate. These high-carb foods include pasta, bread, rice, cereal, fruits, and starchy vegetables. Instead, these diets usually have you eat foods that are high in fat and protein. Many people lose weight quickly on a low-carb diet. But the early weight loss is water. People on this diet often gain the weight back after they start eating carbs again. Not all diet plans are safe or work well. A lot of the evidence shows that low-carb diets aren't healthy. That's because these diets often don't include healthy foods like fruits and vegetables. Losing weight safely means balancing protein, fat, and carbs with every meal and snack. And low-carb diets don't always provide the vitamins, minerals, and fiber you need. If you have a serious medical condition, talk to your doctor before you try any diet.  These conditions include kidney disease, heart disease, type 2 diabetes, high cholesterol, and high blood pressure. If you are pregnant, it may not be safe for your baby if you are on a low-carb diet. How can you lose weight safely? You might have heard that a diet plan helped another person lose weight. But that doesn't mean that it will work for you. It is very hard to stay on a diet that includes lots of big changes in your eating habits. If you want to get to a healthy weight and stay there, making healthy lifestyle changes will often work better than dieting. These steps can help. · Make a plan for change. Work with your doctor to create a plan that is right for you. · See a dietitian. He or she can show you how to make healthy changes in your eating habits. · Manage stress. If you have a lot of stress in your life, it can be hard to focus on making healthy changes to your daily habits. · Track your food and activity. You are likely to do better at losing weight if you keep track of what you eat and what you do. Follow-up care is a key part of your treatment and safety. Be sure to make and go to all appointments, and call your doctor if you are having problems. It's also a good idea to know your test results and keep a list of the medicines you take. Where can you learn more? Go to http://delon-timur.info/. Enter A121 in the search box to learn more about \"Learning About Low-Carbohydrate Diets for Weight Loss. \"  Current as of: May 12, 2017  Content Version: 11.4  © 7189-8528 [employer]. Care instructions adapted under license by [employer] (which disclaims liability or warranty for this information). If you have questions about a medical condition or this instruction, always ask your healthcare professional. [place] [address] any warranty or liability for your use of this information.        Low Sodium Diet (2,000 Milligram): Care Instructions  Your Care Instructions    Too much sodium causes your body to hold on to extra water. This can raise your blood pressure and force your heart and kidneys to work harder. In very serious cases, this could cause you to be put in the hospital. It might even be life-threatening. By limiting sodium, you will feel better and lower your risk of serious problems. The most common source of sodium is salt. People get most of the salt in their diet from canned, prepared, and packaged foods. Fast food and restaurant meals also are very high in sodium. Your doctor will probably limit your sodium to less than 2,000 milligrams (mg) a day. This limit counts all the sodium in prepared and packaged foods and any salt you add to your food. Follow-up care is a key part of your treatment and safety. Be sure to make and go to all appointments, and call your doctor if you are having problems. It's also a good idea to know your test results and keep a list of the medicines you take. How can you care for yourself at home? Read food labels  · Read labels on cans and food packages. The labels tell you how much sodium is in each serving. Make sure that you look at the serving size. If you eat more than the serving size, you have eaten more sodium. · Food labels also tell you the Percent Daily Value for sodium. Choose products with low Percent Daily Values for sodium. · Be aware that sodium can come in forms other than salt, including monosodium glutamate (MSG), sodium citrate, and sodium bicarbonate (baking soda). MSG is often added to Asian food. When you eat out, you can sometimes ask for food without MSG or added salt. Buy low-sodium foods  · Buy foods that are labeled \"unsalted\" (no salt added), \"sodium-free\" (less than 5 mg of sodium per serving), or \"low-sodium\" (less than 140 mg of sodium per serving). Foods labeled \"reduced-sodium\" and \"light sodium\" may still have too much sodium.  Be sure to read the label to see how much sodium you are getting. · Buy fresh vegetables, or frozen vegetables without added sauces. Buy low-sodium versions of canned vegetables, soups, and other canned goods. Prepare low-sodium meals  · Cut back on the amount of salt you use in cooking. This will help you adjust to the taste. Do not add salt after cooking. One teaspoon of salt has about 2,300 mg of sodium. · Take the salt shaker off the table. · Flavor your food with garlic, lemon juice, onion, vinegar, herbs, and spices. Do not use soy sauce, lite soy sauce, steak sauce, onion salt, garlic salt, celery salt, mustard, or ketchup on your food. · Use low-sodium salad dressings, sauces, and ketchup. Or make your own salad dressings and sauces without adding salt. · Use less salt (or none) when recipes call for it. You can often use half the salt a recipe calls for without losing flavor. Other foods such as rice, pasta, and grains do not need added salt. · Rinse canned vegetables, and cook them in fresh water. This removes some-but not all-of the salt. · Avoid water that is naturally high in sodium or that has been treated with water softeners, which add sodium. Call your local water company to find out the sodium content of your water supply. If you buy bottled water, read the label and choose a sodium-free brand. Avoid high-sodium foods  · Avoid eating:  ¨ Smoked, cured, salted, and canned meat, fish, and poultry. ¨ Ham, alberto, hot dogs, and luncheon meats. ¨ Regular, hard, and processed cheese and regular peanut butter. ¨ Crackers with salted tops, and other salted snack foods such as pretzels, chips, and salted popcorn. ¨ Frozen prepared meals, unless labeled low-sodium. ¨ Canned and dried soups, broths, and bouillon, unless labeled sodium-free or low-sodium. ¨ Canned vegetables, unless labeled sodium-free or low-sodium. ¨ [place] fries, pizza, tacos, and other fast foods.   ¨ Pickles, olives, ketchup, and other condiments, especially soy sauce, unless labeled sodium-free or low-sodium. Where can you learn more? Go to http://delon-timur.info/. Enter C598 in the search box to learn more about \"Low Sodium Diet (2,000 Milligram): Care Instructions. \"  Current as of: May 12, 2017  Content Version: 11.4  © 2092-4017 [employer]. Care instructions adapted under license by [employer] (which disclaims liability or warranty for this information). If you have questions about a medical condition or this instruction, always ask your healthcare professional. [place] [address] any warranty or liability for your use of this information. Carpal Tunnel Syndrome: Exercises  Your Care Instructions  Here are some examples of typical rehabilitation exercises for your condition. Start each exercise slowly. Ease off the exercise if you start to have pain. Your doctor or your physical or occupational therapist will tell you when you can start these exercises and which ones will work best for you. Warm-up stretches  When you no longer have pain or numbness, you can do exercises to help prevent carpal tunnel syndrome from coming back. Do not do any stretch or movement that is uncomfortable or painful. 1. Rotate your wrist up, down, and from side to side. Repeat 4 times. 2. Stretch your fingers far apart. Relax them, and then stretch them again. Repeat 4 times. 3. Stretch your thumb by pulling it back gently, holding it, and then releasing it. Repeat 4 times. How to do the exercises  Prayer stretch    1. Start with your palms together in front of your chest just below your chin. 2. Slowly lower your hands toward your waistline, keeping your hands close to your stomach and your palms together until you feel a mild to moderate stretch under your forearms. 3. Hold for at least 15 to 30 seconds. Repeat 2 to 4 times. Wrist flexor stretch    1.  Extend your arm in front of you with your palm up.  2. Bend your wrist, pointing your hand toward the floor. 3. With your other hand, gently bend your wrist farther until you feel a mild to moderate stretch in your forearm. 4. Hold for at least 15 to 30 seconds. Repeat 2 to 4 times. Wrist extensor stretch    1. Repeat steps 1 through 4 of the stretch above, but begin with your extended hand palm down. Follow-up care is a key part of your treatment and safety. Be sure to make and go to all appointments, and call your doctor if you are having problems. It's also a good idea to know your test results and keep a list of the medicines you take. Where can you learn more? Go to http://delon-timur.info/. Enter L626 in the search box to learn more about \"Carpal Tunnel Syndrome: Exercises. \"  Current as of: March 21, 2017  Content Version: 11.4  © 9107-5417 [employer]. Care instructions adapted under license by [employer] (which disclaims liability or warranty for this information). If you have questions about a medical condition or this instruction, always ask your healthcare professional. [address] any warranty or liability for your use of this information. Carpal Tunnel Release: Before Your Surgery  What is carpal tunnel release? Carpal tunnel release is surgery that reduces the pressure on a nerve in the wrist. Your doctor will cut a ligament that presses on the nerve. This lets the nerve pass freely through the tunnel without being squeezed. The surgery can be open or endoscopic. In open surgery, your doctor makes a small cut in the palm of your hand. This cut is called an incision. In endoscopic surgery, your doctor makes one small incision in the wrist, or one small incision in the wrist and one in the palm. Your doctor puts a thin tube with a camera attached (endoscope) into the incision. Surgical tools are put in along with the endoscope.   In both types of surgeries, the incisions are closed with stitches. The incisions leave scars that usually fade in time. You may be asleep during the surgery. Or you may be awake and have medicine to numb your hand and arm so you will not feel pain. After surgery, your wrist and hand pain should begin to go away. It usually takes 3 to 4 months to recover and 1 year before your hand strength returns. How much hand strength returns is different for each person. You will go home the same day as the surgery. When you can return to work depends on the type of work you do. Follow-up care is a key part of your treatment and safety. Be sure to make and go to all appointments, and call your doctor if you are having problems. It's also a good idea to know your test results and keep a list of the medicines you take. What happens before surgery? ?Surgery can be stressful. This information will help you understand what you can expect. And it will help you safely prepare for surgery. ? Preparing for surgery  ? · Understand exactly what surgery is planned, along with the risks, benefits, and other options. · Tell your doctors ALL the medicines, vitamins, supplements, and herbal remedies you take. Some of these can increase the risk of bleeding or interact with anesthesia. ? · If you take blood thinners, such as warfarin (Coumadin), clopidogrel (Plavix), or aspirin, be sure to talk to your doctor. He or she will tell you if you should stop taking these medicines before your surgery. Make sure that you understand exactly what your doctor wants you to do.   ? · Your doctor will tell you which medicines to take or stop before your surgery. You may need to stop taking certain medicines a week or more before surgery. So talk to your doctor as soon as you can.   ? · If you have an advance directive, let your doctor know. It may include a living will and a durable power of  for health care.  Bring a copy to the hospital. If you don't have one, you may want to prepare one. It lets your doctor and loved ones know your health care wishes. Doctors advise that everyone prepare these papers before any type of surgery or procedure. What happens on the day of surgery? · Follow the instructions exactly about when to stop eating and drinking. If you don't, your surgery may be canceled. If your doctor told you to take your medicines on the day of surgery, take them with only a sip of water. ? · Take a bath or shower before you come in for your surgery. Do not apply lotions, perfumes, deodorants, or nail polish. ? · Do not shave the surgical site yourself. ? · Take off all jewelry and piercings. And take out contact lenses, if you wear them. ? At the hospital or surgery center   · Bring a picture ID. ? · The area for surgery is often marked to make sure there are no errors. ? · You will be kept comfortable and safe by your anesthesia provider. The anesthesia may make you sleep. Or it may just numb the area being worked on. ? · The surgery will take about 15 to 60 minutes. Going home   · Be sure you have someone to drive you home. Anesthesia and pain medicine make it unsafe for you to drive. ? · You will be given more specific instructions about recovering from your surgery. They will cover things like diet, wound care, follow-up care, driving, and getting back to your normal routine. When should you call your doctor? · You have questions or concerns. ? · You don't understand how to prepare for your surgery. ? · You become ill before the surgery (such as fever, flu, or a cold). ? · You need to reschedule or have changed your mind about having the surgery. Where can you learn more? Go to http://delon-timur.info/. Enter B728 in the search box to learn more about \"Carpal Tunnel Release: Before Your Surgery. \"  Current as of: March 21, 2017  Content Version: 11.4  © 1436-4484 Healthwise[employer].  Care instructions adapted under license by [employer] (which disclaims liability or warranty for this information). If you have questions about a medical condition or this instruction, always ask your healthcare professional. [address] any warranty or liability for your use of this information. Psoriasis: Care Instructions  Your Care Instructions  Psoriasis (say \"quf-UX-ck-cipriano\") is a long-term skin problem that causes thick, white, silvery, or red patches on the skin. The patches may be small or large, and they occur most often on the knees, elbows, scalp, hands, feet, or lower back. The skin may be scaly. If the condition is severe, your skin can become itchy and tender. Psoriasis also can be embarrassing if the patches are on visible areas. You can treat psoriasis with good care at home and with medicine from your doctor. You may put medicine on your skin and take pills or have shots to stop the redness and swelling. Your doctor also may suggest ultraviolet light treatments. Follow-up care is a key part of your treatment and safety. Be sure to make and go to all appointments, and call your doctor if you are having problems. It's also a good idea to know your test results and keep a list of the medicines you take. How can you care for yourself at home? · If your doctor prescribes medicine, use it exactly as prescribed. Follow your doctor's advice for sunlight or ultraviolet light treatment. Call your doctor if you think you are having a problem with your medicine. · Protect your skin:  ¨ Keep your skin moist. After bathing, put an ointment, cream, or lotion on your skin while it is still damp. This seals in moisture. Use over-the-counter products that your doctor suggests. These may include Cetaphil, Lubriderm, or Eucerin. Petroleum jelly (such as Vaseline) and vegetable shortening (such as Crisco) also work.   ¨ If you have psoriasis on your scalp, use a shampoo with salicylic acid, such as Neutrogena T/Sal.  ¨ Avoid harsh skin products, such as those that contain alcohol. ¨ Cover your skin in cold weather. ¨ Try to prevent sunburn. Although short periods of sun exposure reduce psoriasis in most people, too much sun can damage the skin and cause skin cancer. In addition, sunburns can trigger psoriasis. Use sunscreen on areas of your skin that do not have psoriasis. Make sure to use a broad-spectrum sunscreen that has a sun protection factor (SPF) of 30 or higher. Use it every day, even when it is cloudy. ¨ Take care to avoid accidents such as cutting or scraping your skin. An injury to the skin can cause psoriasis patches to form anywhere on the body, including the area of the injury. ¨ Avoid tight shoes, clothing, watchbands, and hats. These may irritate your skin. ¨ Use a vaporizer or humidifier to add moisture to your bedroom. Follow the directions for cleaning the machine. · Try making one or more changes to your daily habits to help with managing your psoriasis. For example:  ¨ Try to control stress and anxiety. They may cause psoriasis to appear suddenly or can make symptoms worse. ¨ If you smoke, think about quitting. If you need help quitting, talk to your doctor about stop-smoking programs and medicines. ¨ If you drink, limit or reduce the amount of alcohol you drink. ¨ If you are overweight, see if you can lose some weight. · Seek support from family and friends. Talk to a counselor or other professional if you feel sad about your condition and need more help. When should you call for help? Call your doctor now or seek immediate medical care if:  ? · You have signs of infection, such as:  ¨ Increased pain, swelling, warmth, or redness. ¨ Red streaks leading from the area. ¨ Pus draining from the area. ¨ A fever. ? Watch closely for changes in your health, and be sure to contact your doctor if:  ? · You have swelling, stiffness, or pain in your joints.    ? · You do not get better as expected. Where can you learn more? Go to http://delon-timur.info/. Enter L989 in the search box to learn more about \"Psoriasis: Care Instructions. \"  Current as of: October 13, 2016  Content Version: 11.4  © 1827-3463 Healthwise[employer]. Care instructions adapted under license by [employer] (which disclaims liability or warranty for this information). If you have questions about a medical condition or this instruction, always ask your healthcare professional. [address] any warranty or liability for your use of this information.

## 2018-04-12 DIAGNOSIS — E78.2 MIXED HYPERLIPIDEMIA: ICD-10-CM

## 2018-04-12 RX ORDER — ATORVASTATIN CALCIUM 80 MG/1
TABLET, FILM COATED ORAL
Qty: 90 TAB | Refills: 3 | Status: SHIPPED | OUTPATIENT
Start: 2018-04-12 | End: 2019-04-05 | Stop reason: SDUPTHER

## 2018-06-08 DIAGNOSIS — E78.5 DYSLIPIDEMIA: ICD-10-CM

## 2018-06-08 RX ORDER — EZETIMIBE 10 MG/1
TABLET ORAL
Qty: 90 TAB | Refills: 3 | Status: SHIPPED | OUTPATIENT
Start: 2018-06-08 | End: 2019-06-07 | Stop reason: SDUPTHER

## 2018-06-18 ENCOUNTER — OFFICE VISIT (OUTPATIENT)
Dept: FAMILY MEDICINE CLINIC | Age: 58
End: 2018-06-18

## 2018-06-18 VITALS
BODY MASS INDEX: 36.21 KG/M2 | OXYGEN SATURATION: 98 % | HEART RATE: 78 BPM | TEMPERATURE: 98.5 F | RESPIRATION RATE: 16 BRPM | SYSTOLIC BLOOD PRESSURE: 142 MMHG | HEIGHT: 73 IN | DIASTOLIC BLOOD PRESSURE: 82 MMHG | WEIGHT: 273.2 LBS

## 2018-06-18 DIAGNOSIS — G56.03 BILATERAL CARPAL TUNNEL SYNDROME: ICD-10-CM

## 2018-06-18 DIAGNOSIS — Z01.818 PREOP EXAMINATION: Primary | ICD-10-CM

## 2018-06-18 NOTE — MR AVS SNAPSHOT
Xuan Whiteside 
 
 
 [address] [facility] [id number] [phone] Patient: Awilda Gant MRN: [id number] VCX:[id number] Visit Information Date & Time Provider Department Dept. Phone Encounter #  
 6/18/2018  9:30 AM Hector Figueroa, 3 [facility] [phone] [id number] Your Appointments 8/28/2018  1:30 PM  
Follow Up with Shorty Bee MD  
Cardio Specialist at [facility] [place] [address]) Appt Note: fu after echo and CT  
 [address] [facility] [address] [facility] [address]  
  
   
 [address] [facility] [address] 400 [facility] 9/19/2018  1:00 PM  
Follow Up with Hector Figueroa MD  
3 [address]) Appt Note: 6 month f/u  kmb [address] [facility] [address] [phone]  
  
   
 [address] [facility] 280 [facility] Upcoming Health Maintenance Date Due FOBT Q 1 YEAR AGE 50-75 10/1/2008 Influenza Age 5 to Adult 8/1/2018 DTaP/Tdap/Td series (2 - Td) 5/7/2020 Allergies as of 6/18/2018  Review Complete On: 6/18/2018 By: Hector Figueroa MD  
  
 Severity Noted Reaction Type Reactions Brilinta [Ticagrelor]    Other (comments) dyspnea Sildenafil Low 08/10/2016    Other (comments) [place] Vardenafil Low 08/10/2016    Other (comments) [place] Current Immunizations  Reviewed on 10/21/2016 Name Date Influenza Vaccine Candiss Amelia) 10/21/2016 Influenza Vaccine PF 9/30/2013 Pneumococcal Conjugate (PCV-13) 8/18/2015  9:25 AM  
 Pneumococcal Polysaccharide (PPSV-23) 2/10/2012 Pneumococcal Vaccine (Unspecified Type) 7/30/2013 Tdap 5/7/2010 Not reviewed this visit You Were Diagnosed With   
  
 Codes Comments Preop examination    -  Primary ICD-10-CM: A89.777 ICD-9-CM: V72.84  Bilateral carpal tunnel syndrome     ICD-10-CM: G56.03 
 ICD-9-CM: 354.0 Vitals BP Pulse Temp Resp Height(growth percentile) Weight(growth percentile) 142/82 (BP 1 Location: Left arm, BP Patient Position: Sitting) 78 98.5 °F (36.9 °C) (Oral) 16 6' 1\" (1.854 m) 273 lb 3.2 oz (123.9 kg) SpO2 BMI Smoking Status 98% 36.04 kg/m2 Former Smoker Vitals History BMI and BSA Data Body Mass Index Body Surface Area 36.04 kg/m 2 2.53 m 2 Preferred Pharmacy Pharmacy Name Phone [facility] [address] [address] [phone] Your Updated Medication List  
  
   
This list is accurate as of 6/18/18  9:34 AM.  Always use your most recent med list.  
  
  
  
  
 albuterol 90 mcg/actuation inhaler Commonly known as:  PROVENTIL HFA, VENTOLIN HFA, PROAIR HFA Take 2 Puffs by inhalation every four (4) hours as needed. ASPIR-81 PO Take 162 mg by mouth daily. atorvastatin 80 mg tablet Commonly known as:  LIPITOR  
take 1 tablet by mouth once daily  
  
 ergocalciferol 50,000 unit capsule Commonly known as:  ERGOCALCIFEROL Take 1 Cap by mouth every seven (7) days. ezetimibe 10 mg tablet Commonly known as:  ZETIA  
take 1 tablet by mouth once daily FISH OIL 1,000 mg Cap Generic drug:  omega-3 fatty acids-vitamin e Take 4,000 mg by mouth daily. losartan 100 mg tablet Commonly known as:  COZAAR  
take 1 tablet by mouth once daily  
  
 metoprolol succinate 25 mg XL tablet Commonly known as:  TOPROL-XL  
take 1 tablet by mouth once daily  
  
 nitroglycerin 0.4 mg SL tablet Commonly known as:  NITROSTAT  
1 Tab by SubLINGual route every five (5) minutes as needed for Chest Pain.  
  
 triamcinolone acetonide 0.1 % topical cream  
Commonly known as:  KENALOG Apply sparingly to affected areas twice daily, do not use for more than 14 days consecutively without a break  
  
 umeclidinium-vilanterol 62.5-25 mcg/actuation inhaler Commonly known as:  Silvio [place] Take 1 Puff by inhalation daily. Patient Instructions Obtain preop studies Introducing [facility] & HEALTH SERVICES! Dear Benjamin Mcocy: Thank you for requesting a JUNIQE account. Our records indicate that you already have an active JUNIQE account. You can access your account anytime at [email or web address] Did you know that you can access your hospital and ER discharge instructions at any time in JUNIQE? You can also review all of your test results from your hospital stay or ER visit. Additional Information If you have questions, please visit the Frequently Asked Questions section of the JUNIQE website at [email or web address] Remember, JUNIQE is NOT to be used for urgent needs. For medical emergencies, dial 911. Now available from your iPhone and Android! Please provide this summary of care documentation to your next provider. Your primary care clinician is listed as Rubio Hinton. If you have any questions after today's visit, please call [phone].

## 2018-06-18 NOTE — PROGRESS NOTES
Cornelio Donald is a 59 y.o. male here for pre op       Cornelio Donald is a 59 y.o. male (: 1960) presenting to address:    Chief Complaint   Patient presents with    Pre-op Exam     pt  here for Pre op clearance. he will be having L carpal tunnel surgery on net tuesday        Vitals:    18 0917   BP: 142/82   Pulse: 78   Resp: 16   SpO2: 98%   Weight: 273 lb 3.2 oz (123.9 kg)   Height: 6' 1\" (1.854 m)   PainSc:   0 - No pain       Hearing/Vision:   No exam data present    Learning Assessment:     Learning Assessment 2016   PRIMARY LEARNER Patient   HIGHEST LEVEL OF EDUCATION - PRIMARY LEARNER  -   BARRIERS PRIMARY LEARNER -   CO-LEARNER CAREGIVER -   PRIMARY LANGUAGE ENGLISH   LEARNER PREFERENCE PRIMARY READING   ANSWERED BY pt   RELATIONSHIP SELF     Depression Screening:     PHQ over the last two weeks 2018   Little interest or pleasure in doing things Not at all   Feeling down, depressed or hopeless Not at all   Total Score PHQ 2 0     Fall Risk Assessment:   No flowsheet data found. Abuse Screening:   No flowsheet data found. Coordination of Care Questionaire:   1. Have you been to the ER, urgent care clinic since your last visit? Hospitalized since your last visit? NO    2. Have you seen or consulted any other health care providers outside of the [address] [place] [address] since your last visit? Include any pap smears or colon screening. NO    Advanced Directive:   1. Do you have an Advanced Directive? NO    2. Would you like information on Advanced Directives?  NO

## 2018-06-18 NOTE — PROGRESS NOTES
HISTORY OF PRESENT ILLNESS  Wilberto Lee is a 59 y.o. male. Pre-op Exam   The history is provided by the patient and medical records. Associated symptoms include shortness of breath (at baseline). Pertinent negatives include no chest pain and no headaches. Mr. Bruce Nicole is scheduled for a carpal tunnel release, left wrist, presumably under local or regional anesthesia, possibly with conscious sedation.     Past Medical History:   Diagnosis Date    Aortic aneurysm     annulus 34 x 16mm,  SOV 39 x 39mm,  STJ 35 x 38mm,  MID ASC 39 x 38mm,  MAX DIAMETER (at level of RPA) 41 x 43mm,  PROX ARCH 37 x 37mm,  DIST ARCH 34 x 32mm,  DESC AO 28 x 28mm   (CTA 12/15)    Coronary artery disease     LAD - 4.0 x 18mm XIENCE 4/16, OM1 - 4.0 x 15mm XIENCE 4/16     Dyslipidemia     Hypertension     Mitral regurgitation     moderate (TTE 02/17),  moderate (ANITA 4/16)    Pulmonary fibrosis     upper lobes bilat (CTA 12/16)    Remote tobacco use     quit 2012     Past Surgical History:   Procedure Laterality Date    HX CERVICAL FUSION      cervical fusion    HX ORTHOPAEDIC      prior (L) knee surgery     Family History   Problem Relation Age of Onset    Post-op Nausea/Vomiting Mother     Cancer Mother      lung cancer     Heart Disease Father     Headache Father     Hypertension Maternal Grandmother     Heart Disease Maternal Grandmother     Cancer Paternal Grandmother      throat cancer     Diabetes Neg Hx     Stroke Neg Hx      History   Smoking Status    Former Smoker    Packs/day: 1.00    Years: 40.00    Types: Cigarettes    Quit date: 8/10/2013   Smokeless Tobacco    Never Used     Comment: 30 years smoking, stopped Oct. 2012     History   Alcohol Use    0.0 oz/week    0 Standard drinks or equivalent per week     Comment: beer 5-18 on weekends     Allergies   Allergen Reactions    Brilinta [Ticagrelor] Other (comments)     dyspnea    Sildenafil Other (comments)     Flushing    Vardenafil Other (comments) Flushing       Current Outpatient Prescriptions:     ezetimibe (ZETIA) 10 mg tablet, take 1 tablet by mouth once daily, Disp: 90 Tab, Rfl: 3    atorvastatin (LIPITOR) 80 mg tablet, take 1 tablet by mouth once daily, Disp: 90 Tab, Rfl: 3    triamcinolone acetonide (KENALOG) 0.1 % topical cream, Apply sparingly to affected areas twice daily, do not use for more than 14 days consecutively without a break, Disp: 28.4 g, Rfl: 3    umeclidinium-vilanterol (ANORO ELLIPTA) 62.5-25 mcg/actuation inhaler, Take 1 Puff by inhalation daily. , Disp: 1 Inhaler, Rfl: 11    ergocalciferol (ERGOCALCIFEROL) 50,000 unit capsule, Take 1 Cap by mouth every seven (7) days. , Disp: 12 Cap, Rfl: 3    losartan (COZAAR) 100 mg tablet, take 1 tablet by mouth once daily, Disp: 90 Tab, Rfl: 3    metoprolol succinate (TOPROL-XL) 25 mg XL tablet, take 1 tablet by mouth once daily, Disp: 90 Tab, Rfl: 3    albuterol (PROVENTIL HFA, VENTOLIN HFA, PROAIR HFA) 90 mcg/actuation inhaler, Take 2 Puffs by inhalation every four (4) hours as needed. , Disp: , Rfl:     nitroglycerin (NITROSTAT) 0.4 mg SL tablet, 1 Tab by SubLINGual route every five (5) minutes as needed for Chest Pain., Disp: 25 Tab, Rfl: 3    ASPIRIN (ASPIR-81 PO), Take 162 mg by mouth daily. , Disp: , Rfl:     omega-3 fatty acids-vitamin e (FISH OIL) 1,000 mg cap, Take 4,000 mg by mouth daily. , Disp: , Rfl:         Review of Systems   Constitutional: Negative for fever. HENT: Negative for congestion, ear pain and sore throat. Respiratory: Positive for shortness of breath (at baseline). Negative for cough. Cardiovascular: Negative for chest pain and palpitations. Gastrointestinal: Negative for nausea and vomiting. Genitourinary: Negative for dysuria and urgency. Skin: Negative for rash. Neurological: Negative for dizziness and headaches. Endo/Heme/Allergies: Does not bruise/bleed easily.      Visit Vitals    /82 (BP 1 Location: Left arm, BP Patient Position: Sitting)    Pulse 78    Temp 98.5 °F (36.9 °C) (Oral)    Resp 16    Ht 6' 1\" (1.854 m)    Wt 273 lb 3.2 oz (123.9 kg)    SpO2 98%    BMI 36.04 kg/m2     Physical Exam   Constitutional: He is oriented to person, place, and time. He appears well-developed and well-nourished. HENT:   Head: Normocephalic. Right Ear: Tympanic membrane and ear canal normal.   Left Ear: Tympanic membrane and ear canal normal.   Mouth/Throat: Oropharynx is clear and moist.   Eyes: Conjunctivae and EOM are normal.   Neck: Neck supple. Cardiovascular: Normal rate, regular rhythm and normal heart sounds. Pulmonary/Chest: Effort normal and breath sounds normal.   Abdominal: Soft. There is no tenderness. Musculoskeletal: He exhibits no edema. Lymphadenopathy:     He has no cervical adenopathy. Neurological: He is alert and oriented to person, place, and time. Skin: Skin is warm and dry. Psychiatric: He has a normal mood and affect. His behavior is normal.   Nursing note and vitals reviewed. ASSESSMENT and PLAN    ICD-10-CM ICD-9-CM    1. Preop examination Z01.818 V72.84    2. Bilateral carpal tunnel syndrome G56.03 354.0    Obtain preop studies as ordered  Medical clearance for surgery pending results. 6/19/2018:  Preop lab and EKG reviewed, medically cleared for surgery under local or regional anesthesia, possibly with conscious sedation.

## 2018-06-20 DIAGNOSIS — E78.2 MIXED HYPERLIPIDEMIA: ICD-10-CM

## 2018-06-20 RX ORDER — METOPROLOL SUCCINATE 25 MG/1
TABLET, EXTENDED RELEASE ORAL
Qty: 90 TAB | Refills: 3 | Status: SHIPPED | OUTPATIENT
Start: 2018-06-20 | End: 2019-06-15 | Stop reason: SDUPTHER

## 2018-07-26 ENCOUNTER — HOSPITAL ENCOUNTER (OUTPATIENT)
Dept: NON INVASIVE DIAGNOSTICS | Age: 58
Discharge: HOME | End: 2018-07-26
Attending: INTERNAL MEDICINE
Payer: COMMERCIAL

## 2018-07-26 ENCOUNTER — HOSPITAL ENCOUNTER (OUTPATIENT)
Dept: CT IMAGING | Age: 58
Discharge: HOME | End: 2018-07-26
Attending: INTERNAL MEDICINE
Payer: COMMERCIAL

## 2018-07-26 VITALS
HEIGHT: 73 IN | DIASTOLIC BLOOD PRESSURE: 78 MMHG | WEIGHT: 273 LBS | SYSTOLIC BLOOD PRESSURE: 142 MMHG | BODY MASS INDEX: 36.18 KG/M2

## 2018-07-26 DIAGNOSIS — I77.9 AORTA DISORDER (HCC): ICD-10-CM

## 2018-07-26 DIAGNOSIS — I34.0 MITRAL VALVE INSUFFICIENCY, UNSPECIFIED ETIOLOGY: ICD-10-CM

## 2018-07-26 LAB
ECHO AO ROOT DIAM: 3.45 CM
ECHO AV AREA PEAK VELOCITY: -11.6 CM2
ECHO AV AREA/BSA PEAK VELOCITY: -4.6 CM2/M2
ECHO AV PEAK GRADIENT: 0.5 MMHG
ECHO AV PEAK VELOCITY: -35.39 CM/S
ECHO EST RA PRESSURE: 10 MMHG
ECHO LA VOL 2C: 48.7 ML (ref 18–58)
ECHO LA VOL 4C: 51.6 ML (ref 18–58)
ECHO LA VOLUME INDEX A2C: 19.83 ML/M2
ECHO LA VOLUME INDEX A4C: 21.01 ML/M2
ECHO LV EDV A2C: 106.1 ML
ECHO LV EDV A4C: 146.7 ML
ECHO LV EDV BP: 130.9 ML (ref 67–155)
ECHO LV EDV INDEX A4C: 59.7 ML/M2
ECHO LV EDV INDEX BP: 53.3 ML/M2
ECHO LV EDV NDEX A2C: 43.2 ML/M2
ECHO LV EJECTION FRACTION A2C: 41 %
ECHO LV EJECTION FRACTION A4C: 62 %
ECHO LV EJECTION FRACTION BIPLANE: 54.4 % (ref 55–100)
ECHO LV ESV A2C: 63.1 ML
ECHO LV ESV A4C: 55.1 ML
ECHO LV ESV BP: 59.7 ML (ref 22–58)
ECHO LV ESV INDEX A2C: 25.7 ML/M2
ECHO LV ESV INDEX A4C: 22.4 ML/M2
ECHO LV ESV INDEX BP: 24.3 ML/M2
ECHO LV INTERNAL DIMENSION DIASTOLIC: 4.04 CM (ref 4.2–5.9)
ECHO LV INTERNAL DIMENSION SYSTOLIC: 3.33 CM
ECHO LV ISOVOLUMETRIC RELAXATION TIME (IVRT): 49.9 MS
ECHO LV IVSD: 1.24 CM (ref 0.6–1)
ECHO LV MASS 2D: 189.3 G (ref 88–224)
ECHO LV MASS INDEX 2D: 77.1 G/M2
ECHO LV POSTERIOR WALL DIASTOLIC: 1.11 CM (ref 0.6–1)
ECHO LVOT DIAM: 2.29 CM
ECHO LVOT PEAK GRADIENT: 4 MMHG
ECHO LVOT PEAK VELOCITY: 100.06 CM/S
ECHO MV A VELOCITY: 101.45 CM/S
ECHO MV AREA PHT: 4.9 CM2
ECHO MV E DECELERATION TIME (DT): 154.4 MS
ECHO MV E VELOCITY: 0.79 CM/S
ECHO MV E/A RATIO: 0.01
ECHO MV PRESSURE HALF TIME (PHT): 44.8 MS
ECHO MV REGURGITANT PEAK GRADIENT: 117.4 MMHG
ECHO MV REGURGITANT PEAK VELOCITY: 541.83 CM/S
ECHO MV REGURGITANT VTIA: 160.04 CM
ECHO PULMONARY ARTERY SYSTOLIC PRESSURE (PASP): 10 MMHG
ECHO PVEIN A DURATION: 66.5 MS
ECHO PVEIN A VELOCITY: 26.02 CM/S
ECHO RIGHT VENTRICULAR SYSTOLIC PRESSURE (RVSP): 10 MMHG
ECHO TV REGURGITANT MAX VELOCITY: 0 CM/S
ECHO TV REGURGITANT PEAK GRADIENT: 0 MMHG

## 2018-07-26 PROCEDURE — 93306 TTE W/DOPPLER COMPLETE: CPT

## 2018-07-26 PROCEDURE — 74011636320 HC RX REV CODE- 636/320: Performed by: INTERNAL MEDICINE

## 2018-07-26 PROCEDURE — 71275 CT ANGIOGRAPHY CHEST: CPT

## 2018-07-26 PROCEDURE — 74011000258 HC RX REV CODE- 258: Performed by: INTERNAL MEDICINE

## 2018-07-26 RX ORDER — SODIUM CHLORIDE 9 MG/ML
100 INJECTION, SOLUTION INTRAVENOUS
Status: COMPLETED | OUTPATIENT
Start: 2018-07-26 | End: 2018-07-26

## 2018-07-26 RX ADMIN — IOPAMIDOL 94 ML: 612 INJECTION, SOLUTION INTRAVENOUS at 09:21

## 2018-07-26 RX ADMIN — SODIUM CHLORIDE 90 ML: 900 INJECTION, SOLUTION INTRAVENOUS at 09:21

## 2018-08-09 DIAGNOSIS — I10 ESSENTIAL HYPERTENSION: ICD-10-CM

## 2018-08-09 RX ORDER — LOSARTAN POTASSIUM 100 MG/1
TABLET ORAL
Qty: 90 TAB | Refills: 3 | Status: SHIPPED | OUTPATIENT
Start: 2018-08-09 | End: 2019-08-07 | Stop reason: SDUPTHER

## 2018-08-28 ENCOUNTER — OFFICE VISIT (OUTPATIENT)
Dept: CARDIOLOGY CLINIC | Age: 58
End: 2018-08-28

## 2018-08-28 VITALS
WEIGHT: 274 LBS | SYSTOLIC BLOOD PRESSURE: 133 MMHG | DIASTOLIC BLOOD PRESSURE: 72 MMHG | HEART RATE: 74 BPM | BODY MASS INDEX: 36.31 KG/M2 | OXYGEN SATURATION: 94 % | HEIGHT: 73 IN

## 2018-08-28 DIAGNOSIS — I77.9 AORTA DISORDER (HCC): ICD-10-CM

## 2018-08-28 DIAGNOSIS — R06.02 SOB (SHORTNESS OF BREATH): ICD-10-CM

## 2018-08-28 DIAGNOSIS — E78.00 PURE HYPERCHOLESTEROLEMIA: ICD-10-CM

## 2018-08-28 DIAGNOSIS — I25.10 CORONARY ARTERY DISEASE DUE TO LIPID RICH PLAQUE: ICD-10-CM

## 2018-08-28 DIAGNOSIS — I25.83 CORONARY ARTERY DISEASE DUE TO LIPID RICH PLAQUE: ICD-10-CM

## 2018-08-28 DIAGNOSIS — I34.0 MITRAL VALVE INSUFFICIENCY, UNSPECIFIED ETIOLOGY: Primary | ICD-10-CM

## 2018-08-28 DIAGNOSIS — I10 ESSENTIAL HYPERTENSION WITH GOAL BLOOD PRESSURE LESS THAN 140/90: ICD-10-CM

## 2018-08-28 NOTE — MR AVS SNAPSHOT
[address] [facility] [address] [facility] 83 [address] 
[phone] Patient: Krystal Brennan MRN: [id number] RUT:06/5/3740 Visit Information Date & Time Provider Department Dept. Phone Encounter #  
 8/28/2018  1:30 PM Shorty Ayers  [facility] Specialist at [facility]/[address] [phone] [id number] Follow-up Instructions Return in about 9 months (around 5/28/2019). Your Appointments 9/19/2018  1:00 PM  
Follow Up with Nguyễn Argueta MD  
3 [address]) Appt Note: 6 month f/u  kmb [address] [facility] [address] [place], [place] [id number] [phone]  
  
   
 [address] [place] [address] Upcoming Health Maintenance Date Due FOBT Q 1 YEAR AGE 50-75 10/1/2008 ZOSTER VACCINE AGE 60> 8/1/2018 Influenza Age 5 to Adult 8/1/2018 DTaP/Tdap/Td series (2 - Td) 5/7/2020 Allergies as of 8/28/2018  Review Complete On: 7/26/2018 By: Rita Ellison Severity Noted Reaction Type Reactions Brilinta [Ticagrelor]    Other (comments) dyspnea Sildenafil Low 08/10/2016    Other (comments) [place] Vardenafil Low 08/10/2016    Other (comments) [place] Current Immunizations  Reviewed on 10/21/2016 Name Date Influenza Vaccine Mittie Shouts) 10/21/2016 Influenza Vaccine PF 9/30/2013 Pneumococcal Conjugate (PCV-13) 8/18/2015  9:25 AM  
 Pneumococcal Polysaccharide (PPSV-23) 2/10/2012 Pneumococcal Vaccine (Unspecified Type) 7/30/2013 Tdap 5/7/2010 Not reviewed this visit Vitals BP Pulse Height(growth percentile) Weight(growth percentile) SpO2 BMI  
 133/72 (BP 1 Location: Left arm, BP Patient Position: Sitting) 74 6' 1\" (1.854 m) 274 lb (124.3 kg) 94% 36.15 kg/m2 Smoking Status Former Smoker BMI and BSA Data  Body Mass Index Body Surface Area  
 36.15 kg/m 2 2.53 m 2  
  
  
 Preferred Pharmacy Pharmacy Name Phone [facility] [address] [address] [phone] Your Updated Medication List  
  
   
This list is accurate as of 8/28/18  1:41 PM.  Always use your most recent med list.  
  
  
  
  
 albuterol 90 mcg/actuation inhaler Commonly known as:  PROVENTIL HFA, VENTOLIN HFA, PROAIR HFA Take 2 Puffs by inhalation every four (4) hours as needed. ASPIR-81 PO Take 162 mg by mouth daily. atorvastatin 80 mg tablet Commonly known as:  LIPITOR  
take 1 tablet by mouth once daily  
  
 ergocalciferol 50,000 unit capsule Commonly known as:  ERGOCALCIFEROL Take 1 Cap by mouth every seven (7) days. ezetimibe 10 mg tablet Commonly known as:  ZETIA  
take 1 tablet by mouth once daily FISH OIL 1,000 mg Cap Generic drug:  omega-3 fatty acids-vitamin e Take 4,000 mg by mouth daily. losartan 100 mg tablet Commonly known as:  COZAAR  
take 1 tablet by mouth once daily  
  
 metoprolol succinate 25 mg XL tablet Commonly known as:  TOPROL-XL  
take 1 tablet by mouth once daily  
  
 nitroglycerin 0.4 mg SL tablet Commonly known as:  NITROSTAT  
1 Tab by SubLINGual route every five (5) minutes as needed for Chest Pain.  
  
 triamcinolone acetonide 0.1 % topical cream  
Commonly known as:  KENALOG Apply sparingly to affected areas twice daily, do not use for more than 14 days consecutively without a break  
  
 umeclidinium-vilanterol 62.5-25 mcg/actuation inhaler Commonly known as:  Quan [place] Take 1 Puff by inhalation daily. Follow-up Instructions Return in about 9 months (around 5/28/2019). Introducing [facility]! Dear Jaylene Mayo: Thank you for requesting a [employer] account. Our records indicate that you already have an active [employer] account. You can access your account anytime at [email or web address] Did you know that you can access your hospital and ER discharge instructions at any time in [employer]? You can also review all of your test results from your hospital stay or ER visit. Additional Information If you have questions, please visit the Frequently Asked Questions section of the [employer] website at [email or web address] Remember, [employer] is NOT to be used for urgent needs. For medical emergencies, dial 911. Now available from your iPhone and Android! Please provide this summary of care documentation to your next provider. Your primary care clinician is listed as Kika Hinton. If you have any questions after today's visit, please call [phone].

## 2018-08-28 NOTE — PROGRESS NOTES
Mr. Jak Catalan is a pleasant 59 y.o. male with history of coronary artery disease, status post LAD and OM branch stent in April, 2016, aortic aneurysm, mitral regurgitation, dyslipidemia and hypertension. Mr. Jak Catalan is here today for follow up appointment. He denies any symptoms to suggest angina. He has mild dyspnea on moderate exertion. This has remained stable. He admits that he is not performing any regular exercise and he has gained some weight. He denies any chest pain or chest tightness. He denies PND. He denies lower extremity swelling. He takes his medications regularly. He has not used any nitroglycerin since last visit. Past Medical History:   Diagnosis Date    Aortic aneurysm     Coronary artery disease     LAD - 4.0 x 18mm XIENCE 4/16, OM1 - 4.0 x 15mm XIENCE 4/16     Dyslipidemia     Hypertension     Mitral regurgitation     moderate (TTE 02/17),  moderate (ANITA 4/16)    Pulmonary fibrosis     upper lobes bilat (CTA 12/16)    Remote tobacco use     quit 2012        Past Surgical History:   Procedure Laterality Date    HX CERVICAL FUSION      cervical fusion    HX ORTHOPAEDIC      prior (L) knee surgery       Current Outpatient Prescriptions   Medication Sig    losartan (COZAAR) 100 mg tablet take 1 tablet by mouth once daily    metoprolol succinate (TOPROL-XL) 25 mg XL tablet take 1 tablet by mouth once daily    ezetimibe (ZETIA) 10 mg tablet take 1 tablet by mouth once daily    atorvastatin (LIPITOR) 80 mg tablet take 1 tablet by mouth once daily    ergocalciferol (ERGOCALCIFEROL) 50,000 unit capsule Take 1 Cap by mouth every seven (7) days.  ASPIRIN (ASPIR-81 PO) Take 162 mg by mouth daily.  omega-3 fatty acids-vitamin e (FISH OIL) 1,000 mg cap Take 4,000 mg by mouth daily.     triamcinolone acetonide (KENALOG) 0.1 % topical cream Apply sparingly to affected areas twice daily, do not use for more than 14 days consecutively without a break    umeclidinium-vilanterol [facility] ELLIPTA) 62.5-25 mcg/actuation inhaler Take 1 Puff by inhalation daily.  albuterol (PROVENTIL HFA, VENTOLIN HFA, PROAIR HFA) 90 mcg/actuation inhaler Take 2 Puffs by inhalation every four (4) hours as needed.  nitroglycerin (NITROSTAT) 0.4 mg SL tablet 1 Tab by SubLINGual route every five (5) minutes as needed for Chest Pain. No current facility-administered medications for this visit. Allergies and Intolerances: Allergies   Allergen Reactions    Brilinta [Ticagrelor] Other (comments)     dyspnea    Sildenafil Other (comments)     Flushing    Vardenafil Other (comments)     Flushing          Family History:    Heart Disease Father        Social History:   He  reports that he quit smoking about 5 years ago. His smoking use included Cigarettes. He has a 40.00 pack-year smoking history. He has never used smokeless tobacco.  He  reports that he drinks alcohol. Physical:   Vitals:   BP: 133/72  HR: 74  Wt: 274 lb (124.3 kg)    Exam:   General:          Middle aged  gentleman, no complaints, no distress.     Head/Neck:     No JVD                           No bruits. Lungs:             KQ respiratory distress.                             Clear with good effort. Heart:              Regular.  II/VI systolic murmur.   Abdomen:       Soft. Bowel sounds present  Extremities:     Intact pulses.                             BI edema.     Neurological:   Alert and oriented to person, place, time.                             No gross neurological deficit. Skin:  Warm and dry.     Review of Data:    LABS:   Lab Results   Component Value Date/Time    WBC 7.7 03/09/2018 09:04 AM    HGB 15.5 03/09/2018 09:04 AM    HCT 44.8 03/09/2018 09:04 AM    PLATELET 687 96/37/1117 09:04 AM    MCV 92 03/09/2018 09:04 AM     Lab Results   Component Value Date/Time    Sodium 142 03/09/2018 09:04 AM    Potassium 4.4 03/09/2018 09:04 AM    Chloride 100 03/09/2018 09:04 AM    CO2 24 03/09/2018 09:04 AM    Anion gap 18.0 03/09/2018 09:04 AM    Glucose 113 (H) 03/09/2018 09:04 AM    BUN 18 03/09/2018 09:04 AM    Creatinine 0.7 03/09/2018 09:04 AM    BUN/Creatinine ratio 19 04/30/2016 04:20 AM    GFR est AA >60 04/30/2016 04:20 AM    GFR est non-AA >60 04/30/2016 04:20 AM    Calcium 9.2 03/09/2018 09:04 AM    Bilirubin, total 0.6 03/09/2018 09:04 AM    AST (SGOT) 19 03/09/2018 09:04 AM    Alk. phosphatase 81 03/09/2018 09:04 AM    Protein, total 7.6 03/09/2018 09:04 AM    Albumin 4.4 03/09/2018 09:04 AM    Globulin 3.2 03/09/2018 09:04 AM    A-G Ratio 1.4 03/09/2018 09:04 AM    ALT (SGPT) 42 (H) 03/09/2018 09:04 AM     Lab Results   Component Value Date/Time    Cholesterol, total 140 03/09/2018 09:04 AM    HDL Cholesterol 48 03/09/2018 09:04 AM    LDL, calculated 58 03/09/2018 09:04 AM    VLDL, calculated 34 (H) 03/09/2018 09:04 AM    Triglyceride 171 (H) 03/09/2018 09:04 AM     Lab Results   Component Value Date/Time    Hemoglobin A1c 5.9 03/09/2018 09:04 AM    Hemoglobin A1c (POC) 5.5 09/15/2017 01:18 PM     EKG:  April 2016:  Sinus rhythm, 54 beats per minute. NSTT. TRANSTHORACIC ECHOCARDIOGRAM: (07/18)  · Estimated left ventricular ejection fraction is 51 - 55%. Left ventricular mild concentric hypertrophy observed. Normal left ventricular wall motion, no regional wall motion abnormality noted. Mild (grade 1) left ventricular diastolic dysfunction. · Right ventricle not well visualized. · Aortic valve is probably trileaflet. · Mitral valve non-specific thickening. Mild mitral annular calcification. Mild to moderate mitral valve regurgitation. · Mild tricuspid valve regurgitation is present. Pulmonary arterial systolic pressure is 30 mmHg. Mild pulmonary hypertension is present.   · Mildly elevated central venous pressure (5-10 mmHg); IVC diameter is less than 21 mm and collapses less than 50% with respiration.        TRANSESOPHAGEAL ECHOCARDIOGRAM: April 2016:   LEFT VENTRICLE: Size was normal. Systolic function was normal.  RIGHT VENTRICLE: The size was normal. Systolic function was normal.  LEFT ATRIUM: Size was normal. No thrombus was identified. There was no  spontaneous echo contrast (\"smoke\"). APPENDAGE: The size was normal. No  thrombus was identified. There was no spontaneous echo contrast (\"smoke\")  in the appendage. DOPPLER: The function was normal (normal emptying  velocity). ATRIAL SEPTUM: There was no evidence of intracardiac shunt by  peripherally-administered agitated saline contrast.  RIGHT ATRIUM: Size was normal.  MITRAL VALVE: There was minimal thickening. There was minimal  calcification. There appeared to be a partial flail of the P2 segment of  the posterior leaflet of the mitral valve. Torn chordae were not  visualized. The papillary muscle appeared to be intact. There was moderate  eccentric mitral regurgitation along the atrial aspect of the anterior  leaflet and along the atrial septum. There was no evidence for pulmonary  vein inflow reversal.    CTA CHEST w/wo: December 2015:  Dilated ascending thoracic aorta with maximal dimensions at the level of right pulmonary artery, 41 x 43 mm.    Severe multifocal coronary artery disease. Please see details above. Hepatic steatosis. Evidence of old healed granulomatous disease. Mild fibrosis in the upper lobes. CT CHEST AORTOGRAM (2018)  CT  AORTOGRAM: Tricuspid aortic valve with mild atherosclerotic calcification. Normal caliber aortic root and isthmus. Aneurysmal ascending thoracic aorta  measures 4.1 cm. Normal caliber transverse thoracic aorta (3.1 cm) and  descending thoracic aorta (3.0 cm). Relatively minimal atherosclerotic disease  within transverse and descending thoracic aorta. More mild atherosclerotic  plaque is seen within the normal caliber upper thoracic aorta. No high-grade  stenosis within celiac or SMA axis origins. No aortic dissection, intramural  hematoma or other acute aortic pathology.     CATHETERIZATION: March and April 2016: (images and report personally reviewed)  LM - normal  LAD - 80-90% ostial/prox (4.0 x 18mm XIENCE 4/16)  D1 - 70%  Cx - 50-60% prox  OM1 - 70-80% mid (4.0 x 15mm XIENCE 4/16)  RCA - plaquing  RPDA - normal  RPLV - normal  EF 50%    MR 2+    STRESS TEST (01/17)  Fair exercise tolerance. Normal blood pressure response to exercise. Myocardial perfusion imaging is normal  There is no area of prior scarring or ongoing ischemia. Overall left ventricle systolic function was severely reduced  without regional wall motion abnormalities (as noted above). Risk/extent of ischemia: Low risk. IMPRESSION AND PLAN:    Coronary artery disease:   Mr. Mirela Jacobs had an LAD and OM branch stent in April of 2016. Stress test in 01/17 without on going ischemia. He does have documented 70% stenosis of first diagonal branch. But no angina currently. No S/L NTG since last time. Most of his symptoms are chronic and stable. For now continue aspirin and Toprol. S/L NTG as needed. Aortic aneurysm:    Mr. Mirela Jacobs had a cardiac CTA from December, 2015, which suggested ascending aortic aneurysm. The measurements are noted above. Repeat CTA in 2018 showed stable ascending aneurysm at 4.1 cm. Given his body habitus I do not believe that dimensions are not markedly abnormal.  For now I would recommend better blood pressure control and serial CAT scan in 2-3 years. Mitral regurgitation:  Mr. Mirela Jcaobs had an echocardiogram, as well as ANITA in 2016, which showed moderate mitral regurgitation, likely from prolapse of the posterior leaflet. Last ECHO with mild - moderate MR in 05/18  Currently he does not have any fluid overload on exam.  He is on Cozaar and Toprol. He does not require any diuretics at this time. HTN:   BP today is 133/71 mm Hg. Continue BB and ARB. Obesity: Weight today is 274 lbs. Thinks he has gained weight. Working on diet and exercise to lose weight. Folllow up in 9 months.

## 2018-08-28 NOTE — PROGRESS NOTES
Chief Complaint   Patient presents with    Follow-up     patient here to follow up today, he denies chest pain, dizz, headache. complains today of more shortness of breath and fatigue than usual.     1. Have you been to the ER, urgent care clinic since your last visit? Hospitalized since your last visit? No    2. Have you seen or consulted any other health care providers outside of the [address] [place] [address] since your last visit? Include any pap smears or colon screening.  No

## 2018-09-03 DIAGNOSIS — I10 ESSENTIAL HYPERTENSION: ICD-10-CM

## 2018-09-03 DIAGNOSIS — E78.2 MIXED HYPERLIPIDEMIA: ICD-10-CM

## 2018-09-03 DIAGNOSIS — R73.03 PREDIABETES: ICD-10-CM

## 2018-09-12 ENCOUNTER — OFFICE VISIT (OUTPATIENT)
Dept: PULMONOLOGY | Facility: CLINIC | Age: 58
End: 2018-09-12

## 2018-09-12 VITALS
HEART RATE: 83 BPM | SYSTOLIC BLOOD PRESSURE: 142 MMHG | TEMPERATURE: 97.5 F | RESPIRATION RATE: 18 BRPM | BODY MASS INDEX: 36.31 KG/M2 | WEIGHT: 274 LBS | HEIGHT: 73 IN | OXYGEN SATURATION: 94 % | DIASTOLIC BLOOD PRESSURE: 77 MMHG

## 2018-09-12 DIAGNOSIS — R05.9 COUGH: ICD-10-CM

## 2018-09-12 DIAGNOSIS — Z91.89 AT RISK FOR SLEEP APNEA: ICD-10-CM

## 2018-09-12 DIAGNOSIS — R06.89 VENTILATORY DEFECT: ICD-10-CM

## 2018-09-12 DIAGNOSIS — R06.02 SOB (SHORTNESS OF BREATH): Primary | ICD-10-CM

## 2018-09-12 LAB
ANION GAP SERPL CALC-SCNC: 22 MMOL/L
AVG GLU, 10930: 119 MG/DL (ref 91–123)
BUN SERPL-MCNC: 22 MG/DL (ref 6–22)
CALCIUM SERPL-MCNC: 9 MG/DL (ref 8.4–10.4)
CHLORIDE SERPL-SCNC: 100 MMOL/L (ref 98–110)
CHOLEST SERPL-MCNC: 127 MG/DL (ref 110–200)
CO2 SERPL-SCNC: 20 MMOL/L (ref 20–32)
CREAT SERPL-MCNC: 0.7 MG/DL (ref 0.5–1.2)
GFRAA, 66117: >60
GFRNA, 66118: >60
GLUCOSE SERPL-MCNC: 137 MG/DL (ref 70–99)
HBA1C MFR BLD HPLC: 5.8 % (ref 4.8–5.9)
HDLC SERPL-MCNC: 3.1 MG/DL (ref 0–5)
HDLC SERPL-MCNC: 41 MG/DL (ref 40–59)
LDLC SERPL CALC-MCNC: 69 MG/DL (ref 50–99)
POTASSIUM SERPL-SCNC: 4.1 MMOL/L (ref 3.5–5.5)
SODIUM SERPL-SCNC: 142 MMOL/L (ref 133–145)
TRIGL SERPL-MCNC: 86 MG/DL (ref 40–149)
VLDLC SERPL CALC-MCNC: 17 MG/DL (ref 8–30)

## 2018-09-12 NOTE — PROGRESS NOTES
[facility]  Pulmonary, Critical Care, and Sleep Medicine      Pulmonary Office Progress Notes    Name: Abel Rosen     : 1960     Date: 2018        Subjective:     Patient is a 59 y.o. male here for shortness of breath follow-up. After the patient was last here he developed an upper respiratory tract infection. He was concerned that the infection would invalidate an assessment of the effects of Anoro on his shortness of breath. He then never took the medication. Not surprisingly, his shortness of breath remains the same. He denies orthopnea or PND. He continues to be short of breath with relatively minimal exertion. For further details in his complaint please see my initial note. The patient has had a persistent cough over the past few months. In general it is nonproductive-or at least he does not bring up purulent sputum or blood. He does not sleep well. He watches TV in bed until he feels ready to fall asleep. He will then turn off the TV. Based on his body mass index of 36 and his thick neck he is at risk for obstructive sleep apnea. In addition to the CT scan the patient had a roughly the time of his February office visit and then one that he had for shortness of breath in July, my review notes no evidence of apical pulmonary fibrosis. There is a hint of minimal emphysema. There is no significant mosaic attenuation. There is atelectasis at the left base that is unchanged between the 2 films. Objective:     Visit Vitals    /77 (BP 1 Location: Right arm, BP Patient Position: At rest)    Pulse 83    Temp 97.5 °F (36.4 °C) (Oral)    Resp 18    Ht 6' 1\" (1.854 m)    Wt 124.3 kg (274 lb)    SpO2 94%    BMI 36.15 kg/m2        Physical Exam:   General: Alert, oriented and in no respiratory distress at rest.  Normal affect. HEENT: EOMI, Sclera anicteric. Gaze conjugate. Neck: No JVD  CVS: S1S2 normal.  No murmurs or gallops.   RRR  RS: Slightly diminished AE bilaterally, no tactile fremitus or egophony, no accessory muscle use. No wheezes rales or rhonchi  Neuro: Normal gait. Facial movements symmetric  Extrm: no upper extremity clubbing or cyanosis  Skin: no malar rash  Physical Exam    Data review:     Hospital Outpatient Visit on 07/26/2018   Component Date Value Ref Range Status    Aortic Valve Systolic Peak Velocity 15/13/2854 -35.39  cm/s Final    Aortic Valve Area by Continuity of* 07/26/2018 -11.6  cm2 Final    LV Ejection Fraction MOD 4C 07/26/2018 62  % Final    LV Ejection Fraction MOD 2C 07/26/2018 41  % Final    Est. RA Pressure 07/26/2018 10.0  mmHg Final    LV ED Vol BP 07/26/2018 130.9  67 - 155 ml Final       Imaging:  I have personally reviewed the patients radiographs and have reviewed the reports:  CT of the neck from 2/22/18 was overall normal, and showed no abnormality to account for the patient's intermittent hoarseness. Chest CT from 7/26/18 showed no new finding to February 2018. Please see the above subjective part. By report, there was evidence of hepatic steatosis and hepatomegaly as well as a 4.1 cm ascending aortic aneurysm     Patient Active Problem List   Diagnosis Code    Essential hypertension I10    Mixed hyperlipidemia E78.2    Mitral regurgitation I34.0    Aortic aneurysm I71.9    Pulmonary fibrosis J84.10    Vitamin D deficiency E55.9    Bilateral carpal tunnel syndrome G56.03    Ex-smoker Z87.891    Granulomatous lung disease ([facility] [address] J84.10    Coronary artery disease involving native coronary artery of native heart without angina pectoris I25.10    Obesity (BMI 30-39. 9) E66.9    Prediabetes R73.03       IMPRESSION:   · No evidence of pulmonary fibrosis  · Evidence of mild pulmonary emphysema  · Dyspnea on exertion unchanged due to no medication trial  · Cough potentially from postnasal drip and reflux as well as any pulmonary problem  · Risk for obstructive sleep apnea RECOMMENDATIONS:   · Therapeutic trial of Anoro  · Consider pulmonary rehab  · Further evaluation of cough return to clinic in 1 month for Anoro trial follow-up  · Consider PSG          Follow-up Disposition: One month return to clinic     Mr. Toñito James has a reminder for a \"due or due soon\" health maintenance. I have asked that he contact his primary care provider for follow-up on this health maintenance.          Willean Siemens, MD

## 2018-09-12 NOTE — PROGRESS NOTES
Verbal Order with read back per Maxene Bosworth, MD  For PFT smart panel. AMB POC PFT complete w/ bronchodilator  AMB POC PFT complete w/o bronchodilator    Dr. Bret Corona MD will co-sign the orders.

## 2018-09-12 NOTE — MR AVS SNAPSHOT
[address] [facility][note 3] [address] [id number] 
[phone] Patient: Rafa Nathan MRN: [id number] FUD:[id number] Visit Information Date & Time Provider Department Dept. Phone Encounter #  
 9/12/2018 11:00 AM MD Mykel Montesinos [facility] [id number] Your Appointments 9/19/2018  1:00 PM  
Follow Up with Erin Montiel MD  
[facility] [address] [facility]) Appt Note: 6 month f/u  [facility] [address] [facility] [address]  
[phone]  
  
   
 [address] [facility]  
  
    
 10/16/2018  1:30 PM  
Follow Up with MD Mykel Montesinos [facility]) Appt Note: 4 week f/u from 9/12/18  
 [facility] [address] [facility] [address] Upcoming Health Maintenance Date Due FOBT Q 1 YEAR AGE 50-75 10/1/2008 ZOSTER VACCINE AGE 60> 8/1/2018 Influenza Age 5 to Adult 8/1/2018 DTaP/Tdap/Td series (2 - Td) 5/7/2020 Allergies as of 9/12/2018  Review Complete On: 9/12/2018 By: Mariano Jaramillo MD  
  
 Severity Noted Reaction Type Reactions Brilinta [Ticagrelor]    Other (comments) dyspnea Sildenafil Low 08/10/2016    Other (comments) [place] Vardenafil Low 08/10/2016    Other (comments) Flushing Current Immunizations  Reviewed on 10/21/2016 Name Date Influenza Vaccine Miesha Trenton) 10/21/2016 Influenza Vaccine PF 9/30/2013 Pneumococcal Conjugate (PCV-13) 8/18/2015  9:25 AM  
 Pneumococcal Polysaccharide (PPSV-23) 2/10/2012 Pneumococcal Vaccine (Unspecified Type) 7/30/2013 Tdap 5/7/2010 Not reviewed this visit You Were Diagnosed With   
  
 Codes Comments  SOB (shortness of breath)    -  Primary ICD-10-CM: R06.02 
 ICD-9-CM: 786.05 Ventilatory defect     ICD-10-CM: R06.89 
ICD-9-CM: 786.9 Cough     ICD-10-CM: R05 ICD-9-CM: 786.2 At risk for sleep apnea     ICD-10-CM: Z91.89 ICD-9-CM: V49.89 Vitals BP Pulse Temp Resp Height(growth percentile) Weight(growth percentile) 142/77 (BP 1 Location: Right arm, BP Patient Position: At rest) 83 97.5 °F (36.4 °C) (Oral) 18 6' 1\" (1.854 m) 274 lb (124.3 kg) SpO2 BMI Smoking Status 94% 36.15 kg/m2 Former Smoker BMI and BSA Data Body Mass Index Body Surface Area  
 36.15 kg/m 2 2.53 m 2 Preferred Pharmacy Pharmacy Name Phone [facility] [address] [facility] [place] [place], [phone] Your Updated Medication List  
  
   
This list is accurate as of 9/12/18 11:48 AM.  Always use your most recent med list.  
  
  
  
  
 ASPIR-81 PO Take 162 mg by mouth daily. atorvastatin 80 mg tablet Commonly known as:  LIPITOR  
take 1 tablet by mouth once daily  
  
 ergocalciferol 50,000 unit capsule Commonly known as:  ERGOCALCIFEROL Take 1 Cap by mouth every seven (7) days. ezetimibe 10 mg tablet Commonly known as:  ZETIA  
take 1 tablet by mouth once daily FISH OIL 1,000 mg Cap Generic drug:  omega-3 fatty acids-vitamin e Take 4,000 mg by mouth daily. losartan 100 mg tablet Commonly known as:  COZAAR  
take 1 tablet by mouth once daily  
  
 metoprolol succinate 25 mg XL tablet Commonly known as:  TOPROL-XL  
take 1 tablet by mouth once daily  
  
 nitroglycerin 0.4 mg SL tablet Commonly known as:  NITROSTAT  
1 Tab by SubLINGual route every five (5) minutes as needed for Chest Pain.  
  
 triamcinolone acetonide 0.1 % topical cream  
Commonly known as:  KENALOG Apply sparingly to affected areas twice daily, do not use for more than 14 days consecutively without a break  
  
 umeclidinium-vilanterol 62.5-25 mcg/actuation inhaler Commonly known as:  Melva Riddlejeanne Take 1 Puff by inhalation daily. We Performed the Following AMB POC PFT COMPLETE W/BRONCHODILATOR [76769 CPT(R)] To-Do List   
 09/13/2018 Procedures: AMB POC PFT COMPLETE W/BRONCHODILATOR Introducing [facility] SERVICES! Dear Tony Byers: Thank you for requesting a [employer] account. Our records indicate that you already have an active [employer] account. You can access your account anytime at [email or web address] Did you know that you can access your hospital and ER discharge instructions at any time in [employer]ban? You can also review all of your test results from your hospital stay or ER visit. Additional Information If you have questions, please visit the Frequently Asked Questions section of the [employer] website at [email or web address] Remember, [employer] is NOT to be used for urgent needs. For medical emergencies, dial 911. Now available from your iPhone and Android! Please provide this summary of care documentation to your next provider. Your primary care clinician is listed as Andrey Szymanski. If you have any questions after today's visit, please call [phone].

## 2018-09-12 NOTE — PROGRESS NOTES
Chief Complaint   Patient presents with    Shortness of Breath     patient states that he has not been taking the anoro, he states he is still short of breath with activity and has been feeling as though he breathes more rapidly when he lays down. he also mentions that now he has to intentionally take a deeper breath. 1. Have you been to the ER, urgent care clinic since your last visit? Hospitalized since your last visit? No    2. Have you seen or consulted any other health care providers outside of the [address] [place] [address] since your last visit? Include any pap smears or colon screening.  No

## 2018-09-19 ENCOUNTER — OFFICE VISIT (OUTPATIENT)
Dept: FAMILY MEDICINE CLINIC | Age: 58
End: 2018-09-19

## 2018-09-19 VITALS
TEMPERATURE: 98.4 F | SYSTOLIC BLOOD PRESSURE: 142 MMHG | OXYGEN SATURATION: 98 % | HEIGHT: 73 IN | HEART RATE: 82 BPM | BODY MASS INDEX: 36.05 KG/M2 | DIASTOLIC BLOOD PRESSURE: 70 MMHG | WEIGHT: 272 LBS | RESPIRATION RATE: 16 BRPM

## 2018-09-19 DIAGNOSIS — L91.8 SKIN TAGS, MULTIPLE ACQUIRED: ICD-10-CM

## 2018-09-19 DIAGNOSIS — Z13.0 SCREENING, ANEMIA, DEFICIENCY, IRON: ICD-10-CM

## 2018-09-19 DIAGNOSIS — E66.9 OBESITY (BMI 30-39.9): ICD-10-CM

## 2018-09-19 DIAGNOSIS — I25.10 CORONARY ARTERY DISEASE INVOLVING NATIVE CORONARY ARTERY OF NATIVE HEART WITHOUT ANGINA PECTORIS: ICD-10-CM

## 2018-09-19 DIAGNOSIS — E78.2 MIXED HYPERLIPIDEMIA: ICD-10-CM

## 2018-09-19 DIAGNOSIS — I10 ESSENTIAL HYPERTENSION: Primary | ICD-10-CM

## 2018-09-19 DIAGNOSIS — Z00.00 ROUTINE GENERAL MEDICAL EXAMINATION AT A HEALTH CARE FACILITY: ICD-10-CM

## 2018-09-19 DIAGNOSIS — L85.3 DRY SKIN: ICD-10-CM

## 2018-09-19 DIAGNOSIS — Z12.5 PROSTATE CANCER SCREENING: ICD-10-CM

## 2018-09-19 DIAGNOSIS — J84.10 GRANULOMATOUS LUNG DISEASE (HCC): ICD-10-CM

## 2018-09-19 DIAGNOSIS — B35.4 TINEA CORPORIS: ICD-10-CM

## 2018-09-19 DIAGNOSIS — R73.03 PREDIABETES: ICD-10-CM

## 2018-09-19 DIAGNOSIS — J84.10 PULMONARY FIBROSIS (HCC): ICD-10-CM

## 2018-09-19 DIAGNOSIS — E55.9 VITAMIN D DEFICIENCY: ICD-10-CM

## 2018-09-19 PROBLEM — E66.01 SEVERE OBESITY (BMI 35.0-39.9): Status: ACTIVE | Noted: 2018-09-19

## 2018-09-19 RX ORDER — KETOCONAZOLE 20 MG/G
CREAM TOPICAL DAILY
Qty: 15 G | Refills: 2 | Status: SHIPPED | OUTPATIENT
Start: 2018-09-19 | End: 2020-06-10

## 2018-09-19 NOTE — PROGRESS NOTES
Edwige Navarrete is a 59 y.o. male here for f/u Edwige Navarrete is a 59 y.o. male (: 1960) presenting to address: Chief Complaint Patient presents with  Hypertension  
  pt here for f/u Vitals:  
 18 1300 BP: 142/70 Pulse: 82 Resp: 16 SpO2: 98% Weight: 272 lb (123.4 kg) Height: 6' 1\" (1.854 m) PainSc:   0 - No pain Hearing/Vision: No exam data present Learning Assessment:  
 
Learning Assessment 2016 PRIMARY LEARNER Patient HIGHEST LEVEL OF EDUCATION - PRIMARY LEARNER  -  
BARRIERS PRIMARY LEARNER -  
CO-LEARNER CAREGIVER -  
PRIMARY LANGUAGE ENGLISH  
LEARNER PREFERENCE PRIMARY READING  
ANSWERED BY pt  
RELATIONSHIP SELF Depression Screening: PHQ over the last two weeks 2018 Little interest or pleasure in doing things Not at all Feeling down, depressed, irritable, or hopeless Not at all Total Score PHQ 2 0 Fall Risk Assessment:  
No flowsheet data found. Abuse Screening: No flowsheet data found. Coordination of Care Questionaire: 1. Have you been to the ER, urgent care clinic since your last visit? Hospitalized since your last visit? NO 
 
2. Have you seen or consulted any other health care providers outside of the [address] [place] [address] since your last visit? Include any pap smears or colon screening. YES cardiology/ pulmonary Advanced Directive: 1. Do you have an Advanced Directive? NO 
 
2. Would you like information on Advanced Directives?  NO

## 2018-09-19 NOTE — PATIENT INSTRUCTIONS
Continue current medications. Ketoconazole cream to rash twice daily Dermatology referral 
Avoid dietary starch and sugar and follow a program of regular aerobic exercise. Return for physical in 6 months, sooner with any problems. Return for fasting lab a few days before the appointment.

## 2018-09-19 NOTE — PROGRESS NOTES
HISTORY OF PRESENT ILLNESS Brandy Bradford is a 59 y.o. male. Hypertension The history is provided by the patient and medical records. This is a chronic problem. Associated symptoms include shortness of breath. Pertinent negatives include no chest pain and no palpitations. Patient Active Problem List  
Diagnosis Code  Essential hypertension I10  
 Mixed hyperlipidemia E78.2  Mitral regurgitation I34.0  Aortic aneurysm I71.9  
 Pulmonary fibrosis J84.10  Vitamin D deficiency E55.9  Bilateral carpal tunnel syndrome G56.03  
 Ex-smoker Z87.891  Granulomatous lung disease ([facility] [address] J84.10  Coronary artery disease involving native coronary artery of native heart without angina pectoris I25.10  Obesity (BMI 30-39. 9) E66.9  Prediabetes R73.03  
 
 
Current Outpatient Prescriptions:  
  losartan (COZAAR) 100 mg tablet, take 1 tablet by mouth once daily, Disp: 90 Tab, Rfl: 3 
  metoprolol succinate (TOPROL-XL) 25 mg XL tablet, take 1 tablet by mouth once daily, Disp: 90 Tab, Rfl: 3 
  ezetimibe (ZETIA) 10 mg tablet, take 1 tablet by mouth once daily, Disp: 90 Tab, Rfl: 3 
  atorvastatin (LIPITOR) 80 mg tablet, take 1 tablet by mouth once daily, Disp: 90 Tab, Rfl: 3 
  triamcinolone acetonide (KENALOG) 0.1 % topical cream, Apply sparingly to affected areas twice daily, do not use for more than 14 days consecutively without a break, Disp: 28.4 g, Rfl: 3 
  umeclidinium-vilanterol (ANORO ELLIPTA) 62.5-25 mcg/actuation inhaler, Take 1 Puff by inhalation daily. , Disp: 1 Inhaler, Rfl: 11 
  ergocalciferol (ERGOCALCIFEROL) 50,000 unit capsule, Take 1 Cap by mouth every seven (7) days. , Disp: 12 Cap, Rfl: 3 
  nitroglycerin (NITROSTAT) 0.4 mg SL tablet, 1 Tab by SubLINGual route every five (5) minutes as needed for Chest Pain., Disp: 25 Tab, Rfl: 3   ASPIRIN (ASPIR-81 PO), Take 81 mg by mouth two (2) times a day., Disp: , Rfl:  
   omega-3 fatty acids-vitamin e (FISH OIL) 1,000 mg cap, Take 4,000 mg by mouth daily. , Disp: , Rfl:  
 
Allergies Allergen Reactions  Brilinta [Ticagrelor] Other (comments) dyspnea  Sildenafil Other (comments) Flushing  Vardenafil Other (comments) Flushing Review of Systems Constitutional: Negative for fever and weight loss. HENT:  
     Post nasal drip Respiratory: Positive for cough, shortness of breath and wheezing. Cardiovascular: Negative for chest pain and palpitations. Skin: Positive for rash (hands, left lateral forearm). Feels skin is abnormally \"flaky\" requests dermatology evaluation Concerned about axillary skin tags Visit Vitals  /70 (BP 1 Location: Left arm, BP Patient Position: Sitting)  Pulse 82  Temp 98.4 °F (36.9 °C) (Oral)  Resp 16  
 Ht 6' 1\" (1.854 m)  Wt 272 lb (123.4 kg)  SpO2 98%  BMI 35.89 kg/m2 Physical Exam  
Constitutional: He is oriented to person, place, and time. He appears well-developed and well-nourished. HENT:  
Head: Normocephalic. Eyes: EOM are normal.  
Neck: Neck supple. Cardiovascular: Normal rate, regular rhythm and normal heart sounds. Pulmonary/Chest: Effort normal. No respiratory distress. He has wheezes (inspiratory and expiratory). Musculoskeletal: He exhibits no edema. Neurological: He is alert and oriented to person, place, and time. Skin: Skin is warm and dry. Rash (erythematous, anular lesion with raised border-lateral left proximal forearm, appearance consistent with tinea corporis  - areas of macular erythema/hyperpigmentation on the fingers) noted. Axillary skin tags Psychiatric: He has a normal mood and affect. His behavior is normal.  
Nursing note and vitals reviewed. ASSESSMENT and PLAN 
  ICD-10-CM ICD-9-CM 1. Essential hypertension Q27 736.2 METABOLIC PANEL, COMPREHENSIVE    URINALYSIS W/ RFLX MICROSCOPIC  
 2. Coronary artery disease involving native coronary artery of native heart without angina pectoris I25.10 414.01   
3. Granulomatous lung disease ([facility] [address] J84.10 518.89   
4. Pulmonary fibrosis J84.10 515   
5. Mixed hyperlipidemia E78.2 272.2 LIPID PANEL  
   TSH 3RD GENERATION 6. Prediabetes R73.03 790.29 HEMOGLOBIN A1C WITH EAG  
7. Vitamin D deficiency E55.9 268.9 VITAMIN D, 25 HYDROXY 8. Tinea corporis B35.4 110.5 ketoconazole (NIZORAL) 2 % topical cream  
   REFERRAL TO DERMATOLOGY 9. Skin tags, multiple acquired L91.8 701.9 REFERRAL TO DERMATOLOGY 10. Dry skin L85.3 701.1 REFERRAL TO DERMATOLOGY 11. Obesity (BMI 30-39. 9) E66.9 278.00   
12. Screening, anemia, deficiency, iron Z13.0 V78.0 13. Routine general medical examination at a health care facility Z00.00 V70.0 HEMOGLOBIN A1C WITH EAG  
   LIPID PANEL  
   METABOLIC PANEL, COMPREHENSIVE URINALYSIS W/ RFLX MICROSCOPIC  
   TSH 3RD GENERATION  
   PSA SCREENING (SCREENING) VITAMIN D, 25 HYDROXY 14. Prostate cancer screening Z12.5 V76.44 PSA SCREENING (SCREENING) Continue current medications. Ketoconazole cream to rash twice daily Dermatology referral 
Avoid dietary starch and sugar and follow a program of regular aerobic exercise. Return for physical in 6 months, sooner with any problems. Return for fasting lab a few days before the appointment.

## 2018-09-19 NOTE — MR AVS SNAPSHOT
[address] [place] [address] [address] [facility] [address] [phone] Patient: Chuy Kilpatrick MRN: [id number] GLP:16/0/5115 Visit Information Date & Time Provider Department Dept. Phone Encounter #  
 9/19/2018  1:00 PM Kika Hinton, [employer]  Your Appointments 10/16/2018  1:30 PM  
Follow Up with MD Matthew Rosas [facility]) Appt Note: 4 week f/u from 9/12/18  
 [facility] [address] [facility]  
  
   
 [facility] [address] Upcoming Health Maintenance Date Due FOBT Q 1 YEAR AGE 50-75 10/1/2008 ZOSTER VACCINE AGE 60> 8/1/2018 Influenza Age 5 to Adult 8/1/2018 DTaP/Tdap/Td series (2 - Td) 5/7/2020 Allergies as of 9/19/2018  Review Complete On: 9/19/2018 By: Kika Hinton MD  
  
 Severity Noted Reaction Type Reactions Brilinta [Ticagrelor]    Other (comments) dyspnea Sildenafil Low 08/10/2016    Other (comments) Flushing Vardenafil Low 08/10/2016    Other (comments) Flushing Current Immunizations  Reviewed on 10/21/2016 Name Date Influenza Vaccine Spreckels Goring) 10/21/2016 Influenza Vaccine PF 9/30/2013 Pneumococcal Conjugate (PCV-13) 8/18/2015  9:25 AM  
 Pneumococcal Polysaccharide (PPSV-23) 2/10/2012 Pneumococcal Vaccine (Unspecified Type) 7/30/2013 Tdap 5/7/2010 Not reviewed this visit You Were Diagnosed With   
  
 Codes Comments Essential hypertension    -  Primary ICD-10-CM: I10 
ICD-9-CM: 401.9 Pulmonary fibrosis ([address]     ICD-10-CM: J84.10 ICD-9-CM: 448 Tinea corporis     ICD-10-CM: B35.4 ICD-9-CM: 110.5 Mixed hyperlipidemia     ICD-10-CM: E78.2 ICD-9-CM: 272.2 Vitamin D deficiency     ICD-10-CM: E55.9 ICD-9-CM: 268.9 Skin tags, multiple acquired     ICD-10-CM: L91.8 ICD-9-CM: 701.9 Dry skin     ICD-10-CM: L85.3 ICD-9-CM: 701.1 Vitals BP Pulse Temp Resp Height(growth percentile) Weight(growth percentile) 142/70 (BP 1 Location: Left arm, BP Patient Position: Sitting) 82 98.4 °F (36.9 °C) (Oral) 16 6' 1\" (1.854 m) 272 lb (123.4 kg) SpO2 BMI Smoking Status 98% 35.89 kg/m2 Former Smoker Vitals History BMI and BSA Data Body Mass Index Body Surface Area  
 35.89 kg/m 2 2.52 m 2 Preferred Pharmacy Pharmacy Name Phone [phone] [facility] [phone] Your Updated Medication List  
  
   
This list is accurate as of 9/19/18  1:27 PM.  Always use your most recent med list.  
  
  
  
  
 ASPIR-81 PO Take 81 mg by mouth two (2) times a day. atorvastatin 80 mg tablet Commonly known as:  LIPITOR  
take 1 tablet by mouth once daily  
  
 ergocalciferol 50,000 unit capsule Commonly known as:  ERGOCALCIFEROL Take 1 Cap by mouth every seven (7) days. ezetimibe 10 mg tablet Commonly known as:  ZETIA  
take 1 tablet by mouth once daily FISH OIL 1,000 mg Cap Generic drug:  omega-3 fatty acids-vitamin e Take 4,000 mg by mouth daily. ketoconazole 2 % topical cream  
Commonly known as:  NIZORAL Apply  to affected area daily. losartan 100 mg tablet Commonly known as:  COZAAR  
take 1 tablet by mouth once daily  
  
 metoprolol succinate 25 mg XL tablet Commonly known as:  TOPROL-XL  
take 1 tablet by mouth once daily  
  
 nitroglycerin 0.4 mg SL tablet Commonly known as:  NITROSTAT  
1 Tab by SubLINGual route every five (5) minutes as needed for Chest Pain.  
  
 umeclidinium-vilanterol 62.5-25 mcg/actuation inhaler Commonly known as:  Serafin Osler Take 1 Puff by inhalation daily. Prescriptions Sent to Pharmacy Refills  
 ketoconazole (NIZORAL) 2 % topical cream 2 Sig: Apply  to affected area daily. Class: Normal  
 Pharmacy: [address], [address] [place] [address] #: [phone] Route: Topical  
  
We Performed the Following REFERRAL TO DERMATOLOGY [REF19 Custom] Comments:  
 Please evaluate patient for his concerns about multiple dermatologic issues - first available [place] Referral Information Referral ID Referred By Referred To  
  
 [id number] [facility] [place] [address] [address] [place]on Burns Phone: [phone] Fax: [phone] Visits Status Start Date End Date 1 New Request 9/19/18 9/19/19 If your referral has a status of pending review or denied, additional information will be sent to support the outcome of this decision. Patient Instructions Continue current medications. Ketoconazole cream to rash twice daily Dermatology referral 
Avoid dietary starch and sugar and follow a program of regular aerobic exercise. Return for physical in 6 months, sooner with any problems. Return for fasting lab a few days before the appointment. Introducing [facility]! Dear Macario Stevenson: Thank you for requesting a [employer] account. Our records indicate that you already have an active [employer] account. You can access your account anytime at [email or web address] Did you know that you can access your hospital and ER discharge instructions at any time in [employer]? You can also review all of your test results from your hospital stay or ER visit. Additional Information If you have questions, please visit the Frequently Asked Questions section of the [employer] website at [email or web address] Remember, [employer] is NOT to be used for urgent needs. For medical emergencies, dial 911. Now available from your iPhone and Android! Please provide this summary of care documentation to your next provider. Your primary care clinician is listed as Idris Macias. If you have any questions after today's visit, please call [phone].

## 2018-09-26 DIAGNOSIS — E55.9 VITAMIN D DEFICIENCY: ICD-10-CM

## 2018-09-26 RX ORDER — ERGOCALCIFEROL 1.25 MG/1
CAPSULE ORAL
Qty: 12 CAP | Refills: 3 | Status: SHIPPED | OUTPATIENT
Start: 2018-09-26 | End: 2019-08-30 | Stop reason: SDUPTHER

## 2018-10-16 ENCOUNTER — OFFICE VISIT (OUTPATIENT)
Dept: PULMONOLOGY | Facility: CLINIC | Age: 58
End: 2018-10-16

## 2018-10-16 VITALS
WEIGHT: 272 LBS | DIASTOLIC BLOOD PRESSURE: 80 MMHG | HEART RATE: 88 BPM | BODY MASS INDEX: 36.05 KG/M2 | RESPIRATION RATE: 18 BRPM | HEIGHT: 73 IN | SYSTOLIC BLOOD PRESSURE: 172 MMHG | OXYGEN SATURATION: 96 %

## 2018-10-16 DIAGNOSIS — R06.09 DOE (DYSPNEA ON EXERTION): Primary | ICD-10-CM

## 2018-10-16 DIAGNOSIS — J44.9 CHRONIC OBSTRUCTIVE PULMONARY DISEASE, UNSPECIFIED COPD TYPE (HCC): ICD-10-CM

## 2018-10-16 RX ORDER — ALBUTEROL SULFATE 90 UG/1
1 AEROSOL, METERED RESPIRATORY (INHALATION)
Qty: 1 INHALER | Refills: 5 | Status: SHIPPED | OUTPATIENT
Start: 2018-10-16 | End: 2019-10-17 | Stop reason: SDUPTHER

## 2018-10-16 NOTE — PROGRESS NOTES
Chief Complaint   Patient presents with    Shortness of Breath     patient states that he has been using the anoro daily and feels that he notices very little change in his breathing. he also notes that it causes him to gag on occasion. 1. Have you been to the ER, urgent care clinic since your last visit? Hospitalized since your last visit? No    2. Have you seen or consulted any other health care providers outside of the [address] [place] [address] since your last visit? Include any pap smears or colon screening.  No

## 2018-10-16 NOTE — PROGRESS NOTES
[facility]  Pulmonary, Critical Care, and Sleep Medicine      Pulmonary Office Progress Notes    Name: Spencer Atwood     : 1960     Date: 10/16/2018        Subjective:     Patient is a 59 y.o. male is here for follow up on shortness of breath. Since he was last here the patient underwent a therapeutic trial of Anoro. He used the device appropriately. He exhaled completely, slowly inhaled, and then held his breath. He did notice a peculiar taste in the back of his throat. He noted no significant improvement in shortness of breath. He does not have albuterol. He continues to be active during the day but has avoided stairs recently because of dyspnea at the top of the stairs. He denies any wakening with shortness of breath and generally feels quite rested when he gets up in the morning. He denies any cough or sputum production. He believes that some of his shortness of breath may be secondary to being out of shape. He and his wife are planning on starting an exercise program and will be buying a treadmill and so he can exercise at home. He denies chest pains. Current Outpatient Prescriptions   Medication Sig Dispense Refill    VITAMIN D2 50,000 unit capsule take 1 capsule by mouth every 7 days 12 Cap 3    ketoconazole (NIZORAL) 2 % topical cream Apply  to affected area daily. 15 g 2    losartan (COZAAR) 100 mg tablet take 1 tablet by mouth once daily 90 Tab 3    metoprolol succinate (TOPROL-XL) 25 mg XL tablet take 1 tablet by mouth once daily 90 Tab 3    ezetimibe (ZETIA) 10 mg tablet take 1 tablet by mouth once daily 90 Tab 3    atorvastatin (LIPITOR) 80 mg tablet take 1 tablet by mouth once daily 90 Tab 3    umeclidinium-vilanterol (ANORO ELLIPTA) 62.5-25 mcg/actuation inhaler Take 1 Puff by inhalation daily. 1 Inhaler 11    nitroglycerin (NITROSTAT) 0.4 mg SL tablet 1 Tab by SubLINGual route every five (5) minutes as needed for Chest Pain.  25 Tab 3    ASPIRIN (ASPIR-81 PO) Take 81 mg by mouth two (2) times a day.  omega-3 fatty acids-vitamin e (FISH OIL) 1,000 mg cap Take 4,000 mg by mouth daily. Objective:     Visit Vitals    /80 (BP 1 Location: Left arm, BP Patient Position: Sitting)    Pulse 88    Resp 18    Ht 6' 1\" (1.854 m)    Wt 123.4 kg (272 lb)    SpO2 96%    BMI 35.89 kg/m2        Physical Exam:   General: Alert, oriented and in no respiratory distress at rest.  Normal affect. HEENT: EOMI, Sclera anicteric. Gaze conjugate. Neck: No JVD  CVS: S1S2 normal.  No gallops. RRR  RS: Mod AE bilaterally, no tactile fremitus or egophony, no accessory muscle use. No wheezes rales or rhonchi  Neuro: Normal gait.   Facial movements symmetric  Extrm: no clubbing or cyanosis  Physical Exam      Data review:      Glucose 09/12/2018 137* 70 - 99 mg/dL Final    BUN 09/12/2018 22  6 - 22 mg/dL Final    Creatinine 09/12/2018 0.7  0.5 - 1.2 mg/dL Final    Sodium 09/12/2018 142  133 - 145 mmol/L Final    Potassium 09/12/2018 4.1  3.5 - 5.5 mmol/L Final    Chloride 09/12/2018 100  98 - 110 mmol/L Final    CO2 09/12/2018 20  20 - 32 mmol/L Final    Calcium 09/12/2018 9.0  8.4 - 10.4 mg/dL Final    Anion gap 09/12/2018 22.0  mmol/L Final    Hemoglobin A1c 09/12/2018 5.8  4.8 - 5.9 % Final    AVG GLU 09/12/2018 119  91 - 123 mg/dL Final   Hospital Outpatient Visit on 07/26/2018   Component Date Value Ref Range Status    LV Ejection Fraction MOD 4C 07/26/2018 62  % Final    LV Ejection Fraction MOD 2C 07/26/2018 41  % Final       Patient Active Problem List   Diagnosis Code    Essential hypertension I10    Mixed hyperlipidemia E78.2    Mitral regurgitation I34.0    Aortic aneurysm I71.9    Pulmonary fibrosis J84.10    Vitamin D deficiency E55.9    Bilateral carpal tunnel syndrome G56.03    Ex-smoker Z87.891    Granulomatous lung disease ([facility] [address] J84.10    Coronary artery disease involving native coronary artery of native heart without angina pectoris I25.10    Obesity (BMI 30-39. 9) E66.9    Prediabetes R73.03    Severe obesity (BMI 35.0-39. 9) E66.01       IMPRESSION:   · Dyspnea on exertion  · COPD  · Minimal response to Anoro      RECOMMENDATIONS:   · Trial of Stiolto  · Albuterol prn  · Encourage exercise   · 4 month return to clinic    Overall I suspect that regular exercise will be the most effective agent in improving his exercise tolerance          Follow-up Disposition: 4-month return    Mr. Dane Byers has a reminder for a \"due or due soon\" health maintenance. I have asked that he contact his primary care provider for follow-up on this health maintenance.          Oxana Vásquez MD

## 2018-10-16 NOTE — MR AVS SNAPSHOT
[address] [place] [id number] 
[phone] Patient: Keron Solomon MRN: [id number] FBP:39/8/7053 Visit Information Date & Time Provider Department Dept. Phone Encounter #  
 10/16/2018  1:30 PM Mariya Whitley MD [facility]  Follow-up Instructions Return in about 4 months (around 2/16/2019). Your Appointments 3/15/2019  1:00 PM  
Follow Up with Barbara Santacruz MD  
[address] [facility]) Appt Note: 6 MONTH F/U APPT [address] [facility] [address] [phone]  
  
   
 [address] [place] [address] Upcoming Health Maintenance Date Due Shingrix Vaccine Age 50> (1 of 2) 10/1/2008 FOBT Q 1 YEAR AGE 50-75 10/1/2008 Influenza Age 5 to Adult 11/19/2018* DTaP/Tdap/Td series (2 - Td) 5/7/2020 *Topic was postponed. The date shown is not the original due date. Allergies as of 10/16/2018  Review Complete On: 10/16/2018 By: Mariya Whitley MD  
  
 Severity Noted Reaction Type Reactions Brilinta [Ticagrelor]    Other (comments) dyspnea Sildenafil Low 08/10/2016    Other (comments) Flushing Vardenafil Low 08/10/2016    Other (comments) Flushing Current Immunizations  Reviewed on 10/21/2016 Name Date Influenza Vaccine Teresita Antis) 10/21/2016 Influenza Vaccine PF 9/30/2013 Pneumococcal Conjugate (PCV-13) 8/18/2015  9:25 AM  
 Pneumococcal Polysaccharide (PPSV-23) 2/10/2012 Pneumococcal Vaccine (Unspecified Type) 7/30/2013 Tdap 5/7/2010 Not reviewed this visit You Were Diagnosed With   
  
 Codes Comments SMITH (dyspnea on exertion)    -  Primary ICD-10-CM: R06.09 
ICD-9-CM: 786.09 Chronic obstructive pulmonary disease, unspecified COPD type ([facility]     ICD-10-CM: J44.9 ICD-9-CM: 279 Vitals BP Pulse Resp Height(growth percentile) Weight(growth percentile) SpO2  
 172/80 (BP 1 Location: Left arm, BP Patient Position: Sitting) 88 18 6' 1\" (1.854 m) 272 lb (123.4 kg) 96% BMI Smoking Status 35.89 kg/m2 Former Smoker BMI and BSA Data Body Mass Index Body Surface Area  
 35.89 kg/m 2 2.52 m 2 Preferred Pharmacy Pharmacy Name Phone [facility] [address] [facility] [place] [address] [address] [phone] Your Updated Medication List  
  
   
This list is accurate as of 10/16/18  2:16 PM.  Always use your most recent med list.  
  
  
  
  
 albuterol 90 mcg/actuation inhaler Commonly known as:  PROVENTIL HFA, VENTOLIN HFA, PROAIR HFA Take 1 Puff by inhalation every four (4) hours as needed for Wheezing. ASPIR-81 PO Take 81 mg by mouth two (2) times a day. atorvastatin 80 mg tablet Commonly known as:  LIPITOR  
take 1 tablet by mouth once daily  
  
 ezetimibe 10 mg tablet Commonly known as:  ZETIA  
take 1 tablet by mouth once daily FISH OIL 1,000 mg Cap Generic drug:  omega-3 fatty acids-vitamin e Take 4,000 mg by mouth daily. ketoconazole 2 % topical cream  
Commonly known as:  NIZORAL Apply  to affected area daily. losartan 100 mg tablet Commonly known as:  COZAAR  
take 1 tablet by mouth once daily  
  
 metoprolol succinate 25 mg XL tablet Commonly known as:  TOPROL-XL  
take 1 tablet by mouth once daily  
  
 nitroglycerin 0.4 mg SL tablet Commonly known as:  NITROSTAT  
1 Tab by SubLINGual route every five (5) minutes as needed for Chest Pain.  
  
 tiotropium-olodaterol 2.5-2.5 mcg/actuation Mist  
Commonly known as:  STIOLTO RESPIMAT Take 1 Inhalation by inhalation daily. umeclidinium-vilanterol 62.5-25 mcg/actuation inhaler Commonly known as:  Chetna Dagoberto Take 1 Puff by inhalation daily. VITAMIN D2 50,000 unit capsule Generic drug:  ergocalciferol take 1 capsule by mouth every 7 days Prescriptions Sent to Pharmacy Refills  
 tiotropium-olodaterol (STIOLTO RESPIMAT) 2.5-2.5 mcg/actuation mist 11 Sig: Take 1 Inhalation by inhalation daily. Class: Normal  
 Pharmacy: [address], [facility] [address] Ph #: [phone] Route: Inhalation  
 albuterol (PROVENTIL HFA, VENTOLIN HFA, PROAIR HFA) 90 mcg/actuation inhaler 5 Sig: Take 1 Puff by inhalation every four (4) hours as needed for Wheezing. Class: Normal  
 Pharmacy: [address], [address] Ph #: [phone] Route: Inhalation Follow-up Instructions Return in about 4 months (around 2/16/2019). [facility]! Dear Amrit Richards: Thank you for requesting a [employer] account. Our records indicate that you already have an active cF[employer] account. You can access your account anytime at [email or web address] [email or web address] Did you know that you can access your hospital and ER discharge instructions at any time in cFares? You can also review all of your test results from your hospital stay or ER visit. Additional Information If you have questions, please visit the Frequently Asked Questions section of the cFares website at [email or web address] [email or web address]/. Remember, [employer] is NOT to be used for urgent needs. For medical emergencies, dial 911. Now available from your iPhone and Android! Please provide this summary of care documentation to your next provider. Your primary care clinician is listed as Donna Healy. If you have any questions after today's visit, please call [phone].

## 2019-03-01 DIAGNOSIS — I10 ESSENTIAL HYPERTENSION: ICD-10-CM

## 2019-03-01 DIAGNOSIS — E55.9 VITAMIN D DEFICIENCY: ICD-10-CM

## 2019-03-01 DIAGNOSIS — Z00.00 ROUTINE GENERAL MEDICAL EXAMINATION AT A HEALTH CARE FACILITY: ICD-10-CM

## 2019-03-01 DIAGNOSIS — E78.2 MIXED HYPERLIPIDEMIA: ICD-10-CM

## 2019-03-01 DIAGNOSIS — R73.03 PREDIABETES: ICD-10-CM

## 2019-03-01 DIAGNOSIS — Z12.5 PROSTATE CANCER SCREENING: ICD-10-CM

## 2019-03-07 LAB
25(OH)D3 SERPL-MCNC: 66 NG/ML (ref 32–100)
A-G RATIO,AGRAT: 1.5 RATIO (ref 1.1–2.6)
ALBUMIN SERPL-MCNC: 4.6 G/DL (ref 3.5–5)
ALP SERPL-CCNC: 85 U/L (ref 40–125)
ALT SERPL-CCNC: 25 U/L (ref 5–40)
ANION GAP SERPL CALC-SCNC: 17 MMOL/L
AST SERPL W P-5'-P-CCNC: 17 U/L (ref 10–37)
AVG GLU, 10930: 105 MG/DL (ref 91–123)
BILIRUB SERPL-MCNC: 0.6 MG/DL (ref 0.2–1.2)
BILIRUB UR QL: NEGATIVE
BUN SERPL-MCNC: 20 MG/DL (ref 6–22)
CALCIUM SERPL-MCNC: 9.6 MG/DL (ref 8.4–10.4)
CHLORIDE SERPL-SCNC: 96 MMOL/L (ref 98–110)
CHOLEST SERPL-MCNC: 127 MG/DL (ref 110–200)
CO2 SERPL-SCNC: 23 MMOL/L (ref 20–32)
CREAT SERPL-MCNC: 0.7 MG/DL (ref 0.8–1.6)
GFRAA, 66117: >60
GFRNA, 66118: >60
GLOBULIN,GLOB: 3 G/DL (ref 2–4)
GLUCOSE SERPL-MCNC: 108 MG/DL (ref 70–99)
GLUCOSE UR QL: NEGATIVE MG/DL
HBA1C MFR BLD HPLC: 5.3 % (ref 4.8–5.9)
HDLC SERPL-MCNC: 3.4 MG/DL (ref 0–5)
HDLC SERPL-MCNC: 37 MG/DL (ref 40–59)
HGB UR QL STRIP: NEGATIVE
KETONES UR QL STRIP.AUTO: NEGATIVE MG/DL
LDLC SERPL CALC-MCNC: 72 MG/DL (ref 50–99)
LEUKOCYTE ESTERASE: NEGATIVE
NITRITE UR QL STRIP.AUTO: NEGATIVE
PH UR STRIP: 5 PH (ref 5–8)
POTASSIUM SERPL-SCNC: 4.3 MMOL/L (ref 3.5–5.5)
PROT SERPL-MCNC: 7.6 G/DL (ref 6.2–8.1)
PROT UR QL STRIP: NEGATIVE MG/DL
PSA SERPL-MCNC: 0.64 NG/ML
SODIUM SERPL-SCNC: 136 MMOL/L (ref 133–145)
SP GR UR: 1.02 (ref 1–1.03)
TRIGL SERPL-MCNC: 95 MG/DL (ref 40–149)
TSH SERPL DL<=0.005 MIU/L-ACNC: 1.24 MCU/ML (ref 0.27–4.2)
UROBILINOGEN UR STRIP-MCNC: <2 MG/DL
VLDLC SERPL CALC-MCNC: 19 MG/DL (ref 8–30)

## 2019-03-15 ENCOUNTER — OFFICE VISIT (OUTPATIENT)
Dept: FAMILY MEDICINE CLINIC | Age: 59
End: 2019-03-15

## 2019-03-15 VITALS
RESPIRATION RATE: 16 BRPM | DIASTOLIC BLOOD PRESSURE: 80 MMHG | WEIGHT: 264.6 LBS | HEART RATE: 76 BPM | BODY MASS INDEX: 35.07 KG/M2 | HEIGHT: 73 IN | TEMPERATURE: 98.2 F | OXYGEN SATURATION: 95 % | SYSTOLIC BLOOD PRESSURE: 130 MMHG

## 2019-03-15 DIAGNOSIS — J84.10 PULMONARY FIBROSIS (HCC): ICD-10-CM

## 2019-03-15 DIAGNOSIS — E66.9 OBESITY (BMI 30-39.9): ICD-10-CM

## 2019-03-15 DIAGNOSIS — E78.2 MIXED HYPERLIPIDEMIA: ICD-10-CM

## 2019-03-15 DIAGNOSIS — Z23 ENCOUNTER FOR IMMUNIZATION: ICD-10-CM

## 2019-03-15 DIAGNOSIS — I25.10 CORONARY ARTERY DISEASE INVOLVING NATIVE CORONARY ARTERY OF NATIVE HEART WITHOUT ANGINA PECTORIS: ICD-10-CM

## 2019-03-15 DIAGNOSIS — I10 ESSENTIAL HYPERTENSION: ICD-10-CM

## 2019-03-15 DIAGNOSIS — Z87.898 HISTORY OF PREDIABETES: ICD-10-CM

## 2019-03-15 DIAGNOSIS — Z00.00 ROUTINE GENERAL MEDICAL EXAMINATION AT A HEALTH CARE FACILITY: Primary | ICD-10-CM

## 2019-03-15 NOTE — PROGRESS NOTES
HISTORY OF PRESENT ILLNESS  Sneha Ochoa is a 59 y.o. male. Physical   The history is provided by the patient and medical records. Pertinent negatives include no chest pain, no abdominal pain, no headaches and no shortness of breath. Patient Active Problem List   Diagnosis Code    Essential hypertension I10    Mixed hyperlipidemia E78.2    Mitral regurgitation I34.0    Aortic aneurysm I71.9    Pulmonary fibrosis J84.10    Vitamin D deficiency E55.9    Bilateral carpal tunnel syndrome G56.03    Ex-smoker Z87.891    Granulomatous lung disease ([facility] [address] J84.10    Coronary artery disease involving native coronary artery of native heart without angina pectoris I25.10    Obesity (BMI 30-39. 9) E66.9    Prediabetes R73.03    Severe obesity (BMI 35.0-39. 9) E66.01     Past Surgical History:   Procedure Laterality Date    HX CERVICAL FUSION      cervical fusion    HX ORTHOPAEDIC      prior (L) knee surgery     Family History   Problem Relation Age of Onset    Post-op Nausea/Vomiting Mother     Cancer Mother         lung cancer     Heart Disease Father     Headache Father     Hypertension Maternal Grandmother     Heart Disease Maternal Grandmother     Cancer Paternal Grandmother         throat cancer     Diabetes Neg Hx     Stroke Neg Hx      Allergies   Allergen Reactions    Brilinta [Ticagrelor] Other (comments)     dyspnea    Sildenafil Other (comments)     Flushing    Vardenafil Other (comments)     Flushing     Social History     Tobacco Use   Smoking Status Former Smoker    Packs/day: 1.00    Years: 40.00    Pack years: 40.00    Types: Cigarettes    Last attempt to quit: 8/10/2013    Years since quittin.5   Smokeless Tobacco Never Used   Tobacco Comment    30 years smoking, stopped Oct. 2012     Social History     Substance and Sexual Activity   Alcohol Use Yes    Alcohol/week: 0.0 oz    Comment: beer 5-18 on weekends     Health Maintenance Review:  Immunization History Administered Date(s) Administered    Influenza Vaccine (Quad) 10/21/2016    Influenza Vaccine PF 09/30/2013    Pneumococcal Conjugate (PCV-13) 08/18/2015    Pneumococcal Polysaccharide (PPSV-23) 02/10/2012    Pneumococcal Vaccine (Unspecified Type) 07/30/2013    Tdap 05/07/2010     Shingrix -   Colonoscopy: 1/2010 normal  ?PSA - current, normal      Review of Systems   Constitutional: Negative for chills, fever and weight loss. HENT: Negative for hearing loss. Eyes: Negative for blurred vision and double vision. Wears corrective lenses   Respiratory: Positive for cough (worse in allergy season). Negative for shortness of breath and wheezing. Cardiovascular: Negative for chest pain, palpitations and leg swelling. Gastrointestinal: Negative for abdominal pain, blood in stool, constipation, diarrhea, heartburn, melena, nausea and vomiting. Genitourinary: Negative for dysuria and urgency. Musculoskeletal: Positive for joint pain (knee pain worse in AM). Negative for myalgias. Skin: Negative for itching and rash. Granuloma annulare   Neurological: Positive for tingling (hands, bilat previous surgery left wrist). Negative for dizziness, sensory change, focal weakness and headaches. Endo/Heme/Allergies: Positive for environmental allergies (seasonal). Psychiatric/Behavioral: Negative for depression. The patient is not nervous/anxious and does not have insomnia. Visit Vitals  /80 (BP 1 Location: Left arm, BP Patient Position: Sitting)   Pulse 76   Temp 98.2 °F (36.8 °C) (Oral)   Resp 16   Ht 6' 1\" (1.854 m)   Wt 264 lb 9.6 oz (120 kg)   SpO2 95%   BMI 34.91 kg/m²       Physical Exam   Constitutional: He is oriented to person, place, and time. He appears well-developed and well-nourished. HENT:   Head: Normocephalic.    Right Ear: Tympanic membrane and ear canal normal.   Left Ear: Tympanic membrane and ear canal normal.   Mouth/Throat: Oropharynx is clear and moist. Eyes: Conjunctivae and EOM are normal. Pupils are equal, round, and reactive to light. Neck: Neck supple. Cardiovascular: Normal rate, regular rhythm, normal heart sounds and intact distal pulses. Pulmonary/Chest: Effort normal and breath sounds normal.   Abdominal: Soft. Bowel sounds are normal. There is no tenderness. Musculoskeletal: He exhibits no edema. Neurological: He is alert and oriented to person, place, and time. He has normal reflexes. Skin: Skin is warm and dry. Rash (Annular with raised borders, bilateral arms from the elbows distally ) noted. Psychiatric: He has a normal mood and affect. His behavior is normal.   Nursing note and vitals reviewed. Results for Juan Diego Quintanilla (MRN [id number]) as of 3/15/2019 12:38   Ref. Range 3/9/2018 09:04 7/26/2018 09:13 7/26/2018 09:42 9/12/2018 08:35 3/7/2019 08:41   ALT (SGPT) Latest Ref Range: 5 - 40 U/L 42 (H)    25   AST Latest Ref Range: 10 - 37 U/L 19    17   Alk. phosphatase Latest Ref Range: 40 - 125 U/L 81    85   Triglyceride Latest Ref Range: 40 - 149 mg/dL 171 (H)   86 95   Cholesterol, total Latest Ref Range: 110 - 200 mg/dL 140   127 127   HDL Cholesterol Latest Ref Range: 40 - 59 mg/dL 48   41 37 (L)   CHOLESTEROL/HDL Latest Ref Range: 0.0 - 5.0  2.9   3.1 3.4   LDL, calculated Latest Ref Range: 50 - 99 mg/dL 58   69 72   VLDL, calculated Latest Ref Range: 8 - 30 mg/dL 34 (H)   17 19   Hemoglobin A1c, (calculated) Latest Ref Range: 4.8 - 5.9 % 5.9   5.8 5.3   TSH Latest Ref Range: 0.27 - 4.20 mcU/mL 1.51    1.24   Glucose Latest Ref Range: 70 - 99 mg/dL 113 (H)   137 (H) 108 (H)   Prostate Specific Ag Latest Ref Range: <=4.100 ng/mL 0.644    0.640   VITAMIN D, 25-HYDROXY Latest Ref Range: 32.0 - 100.0 ng/mL 43.6    66.0     ASSESSMENT and PLAN    ICD-10-CM ICD-9-CM    1. Routine general medical examination at a health care facility Z00.00 V70.0    2. Essential hypertension I10 401.9    3. Mixed hyperlipidemia E78.2 272.2    4. History of prediabetes Z87.898 V12.29    5. Coronary artery disease involving native coronary artery of native heart without angina pectoris I25.10 414.01    6. Pulmonary fibrosis J84.10 515    7. Obesity (BMI 30-39. 9) E66.9 278.00    8. Encounter for immunization Z23 V03.89 ZOSTER VACC RECOMBINANT ADJUVANTED   Continue current medications. Take medication as prescribed and report any suspected adverse effects. Avoid dietary sodium and work on weight control by avoiding starch and sugar. Maintain a regular program of aerobic exercise. Home or office blood pressure checks at least once a week. Report readings consistently >140/90. Return for follow up in 6 months, sooner with any problems.   Return with nurse visit for second Shingrix immunization in 2-6 months - call first to check availability

## 2019-03-15 NOTE — PATIENT INSTRUCTIONS
Continue current medications. Take medication as prescribed and report any suspected adverse effects. Avoid dietary sodium and work on weight control by avoiding starch and sugar. Maintain a regular program of aerobic exercise. Home or office blood pressure checks at least once a week. Report readings consistently >140/90. Return for follow up in 6 months, sooner with any problems. Return with nurse visit for second Shingrix immunization in 2-6 months - call first to check availability       Well Visit, Men 48 to 72: Care Instructions  Your Care Instructions    Physical exams can help you stay healthy. Your doctor has checked your overall health and may have suggested ways to take good care of yourself. He or she also may have recommended tests. At home, you can help prevent illness with healthy eating, regular exercise, and other steps. Follow-up care is a key part of your treatment and safety. Be sure to make and go to all appointments, and call your doctor if you are having problems. It's also a good idea to know your test results and keep a list of the medicines you take. How can you care for yourself at home? · Reach and stay at a healthy weight. This will lower your risk for many problems, such as obesity, diabetes, heart disease, and high blood pressure. · Get at least 30 minutes of exercise on most days of the week. Walking is a good choice. You also may want to do other activities, such as running, swimming, cycling, or playing tennis or team sports. · Do not smoke. Smoking can make health problems worse. If you need help quitting, talk to your doctor about stop-smoking programs and medicines. These can increase your chances of quitting for good. · Protect your skin from too much sun. When you're outdoors from 10 a.m. to 4 p.m., stay in the shade or cover up with clothing and a hat with a wide brim. Wear sunglasses that block UV rays.  Even when it's cloudy, put broad-spectrum sunscreen (SPF 30 or higher) on any exposed skin. · See a dentist one or two times a year for checkups and to have your teeth cleaned. · Wear a seat belt in the car. · Limit alcohol to 2 drinks a day. Too much alcohol can cause health problems. Follow your doctor's advice about when to have certain tests. These tests can spot problems early. · Cholesterol. Your doctor will tell you how often to have this done based on your overall health and other things that can increase your risk for heart attack and stroke. · Blood pressure. Have your blood pressure checked during a routine doctor visit. Your doctor will tell you how often to check your blood pressure based on your age, your blood pressure results, and other factors. · Prostate exam. Talk to your doctor about whether you should have a blood test (called a PSA test) for prostate cancer. Experts disagree on whether men should have this test. Some experts recommend that you discuss the benefits and risks of the test with your doctor. · Diabetes. Ask your doctor whether you should have tests for diabetes. · Vision. Some experts recommend that you have yearly exams for glaucoma and other age-related eye problems starting at age 48. · Hearing. Tell your doctor if you notice any change in your hearing. You can have tests to find out how well you hear. · Colon cancer. You should begin tests for colon cancer at age 48. You may have one of several tests. Your doctor will tell you how often to have tests based on your age and risk. Risks include whether you already had a precancerous polyp removed from your colon or whether your parent, brother, sister, or child has had colon cancer. · Heart attack and stroke risk. At least every 4 to 6 years, you should have your risk for heart attack and stroke assessed.  Your doctor uses factors such as your age, blood pressure, cholesterol, and whether you smoke or have diabetes to show what your risk for a heart attack or stroke is over the next 10 years. · Abdominal aortic aneurysm. Ask your doctor whether you should have a test to check for an aneurysm. You may need a test if you ever smoked or if your parent, brother, sister, or child has had an aneurysm. When should you call for help? Watch closely for changes in your health, and be sure to contact your doctor if you have any problems or symptoms that concern you. Where can you learn more? Go to http://delon-timur.info/. Enter J993 in the search box to learn more about \"Well Visit, Men 48 to 72: Care Instructions. \"  Current as of: March 28, 2018  Content Version: 11.9  © 6456-5045 [employer]. Care instructions adapted under license by [employer] (which disclaims liability or warranty for this information). If you have questions about a medical condition or this instruction, always ask your healthcare professional. [place] [address] any warranty or liability for your use of this information. Learning About the Glycemic Index  What is the glycemic index? The glycemic index (GI) is a rating system for foods that contain carbohydrate. It helps you know how quickly these foods raise blood sugar. Carbohydrate raises blood sugar more quickly than other nutrients, like proteins and fats. Some carbohydrate foods raise blood sugar faster than others. · Low glycemic foods release sugar into the blood slowly. · High glycemic foods make blood sugar rise quickly. How does it work? Foods in the index are ranked by number. · High glycemic index foods are rated 70 and above. · Medium glycemic index foods are 56 to 69. · Low glycemic index foods are 55 or less. What do you need to know? Some people who have diabetes use the glycemic index to help them plan meals and manage blood sugar. · If you have diabetes, talk with your doctor, a dietitian, or a certified diabetes educator before using a low glycemic index eating plan.   · Eating low glycemic foods is most helpful when used along with another eating plan for diabetes, such as carbohydrate counting. Counting carbs helps you know how much carbohydrate you're eating. The amount of carbohydrate you eat is more important than the glycemic index of foods in helping you control your blood sugar. · The rating of a food can change depending on ripeness, how it is prepared (juiced, mashed, ground), how it is cooked, and how long it is stored. · People respond differently to the glycemic content of foods. Many things affect the glycemic index. The only way to know for sure how a food affects your blood sugar is to check your blood sugar before and after you eat that food. · High GI foods are rarely eaten on their own. This means that the glycemic index might not be helpful unless you're eating a food by itself. Eating foods together can change their rating. What are examples of foods in the glycemic index? · High glycemic index foods include:  ? Watermelon. ? Baked potatoes, such as a russet. ? Pumpkin. ? Instant oatmeal.  ? White bread. · Medium glycemic index foods include:  ? Hamburger buns (white). ? Brown rice. · Low glycemic index foods include:  ? Apples. ? Sweet potatoes. ? Whole-grain bread. ? Whole wheat spaghetti. ? Dried beans and lentils. Where can you learn more? Go to http://delon-timur.info/. Enter J637 in the search box to learn more about \"Learning About the Glycemic Index. \"  Current as of: July 25, 2018  Content Version: 11.9  © 8685-5856 [employer]. Care instructions adapted under license by [employer] (which disclaims liability or warranty for this information). If you have questions about a medical condition or this instruction, always ask your healthcare professional. [place] [address] any warranty or liability for your use of this information.          How to Read a Food Label to Limit Sodium: Care Instructions  Your Care Instructions  Sodium causes your body to hold on to extra water. This can raise your blood pressure and force your heart and kidneys to work harder. In very serious cases, this could cause you to be put in the hospital. It might even be life-threatening. By limiting sodium, you will feel better and lower your risk of serious problems. Processed foods, fast food, and restaurant foods are the major sources of dietary sodium. The most common name for sodium is salt. Try to limit how much sodium you eat to less than 2,300 milligrams (mg) a day. If you limit your sodium to 1,500 mg a day, you can lower your blood pressure even more. This limit counts all the salt that you eat in foods you cook or in packaged foods. Keep a list of everything you eat and drink. Follow-up care is a key part of your treatment and safety. Be sure to make and go to all appointments, and call your doctor if you are having problems. It's also a good idea to know your test results and keep a list of the medicines you take. How can you care for yourself at home? Read ingredient lists on food labels  · Read the list of ingredients on food labels to help you find how much sodium is in a food. The label lists the ingredients in a food in descending order (from the most to the least). If salt or sodium is high on the list, there may be a lot of sodium in the food. · Know that sodium has different names. Sodium is also called monosodium glutamate (MSG, common in [facility] food), sodium citrate, sodium alginate, sodium hydroxide, and sodium phosphate. Read Nutrition Facts labels  · On most foods, there is a Nutrition Facts label. This will tell you how much sodium is in one serving of food. Look at both the serving size and the sodium amount. The serving size is located at the top of the label, usually right under the \"Nutrition Facts\" title. The amount of sodium is given in the list under the title.  It is given in milligrams (mg).  ? Check the serving size carefully. A single serving is often very small, and you may eat more than one serving. If this is the case, you will eat more sodium than listed on the label. For example, if the serving size for a canned soup is 1 cup and the sodium amount is 470 mg, if you have 2 cups you will eat 940 mg of sodium. · The nutrition facts for fresh fruits and vegetables are not listed on the food. They may be listed somewhere in the store. These foods usually have no sodium or low sodium. · The Nutrition Facts label also gives you the Percent Daily Value for sodium. This is how much of the recommended amount of sodium a serving contains. The daily value for sodium is less than 2,300 mg. So if the Percent Daily Value says 50%, this means one serving is giving you half of this, or 1,150 mg. Buy low-sodium foods  · Look for foods that are made with less sodium. Watch for the following words on the label. ? \"Unsalted\" means there is no sodium added to the food. But there may be sodium already in the food naturally. ? \"Sodium-free\" means a serving has less than 5 mg of sodium. ? \"Very low sodium\" means a serving has 35 mg or less of sodium. ? \"Low-sodium\" means a serving has 140 mg or less of sodium. · \"Reduced-sodium\" means that there is 25% less sodium than what the food normally has. This is still usually too much sodium. Try not to buy foods with this on the label. · Buy fresh vegetables, or frozen vegetables without added sauces. Buy low-sodium versions of canned vegetables, soups, and other canned goods. Where can you learn more? Go to http://delon-timur.info/. Enter 26 301206 in the search box to learn more about \"How to Read a Food Label to Limit Sodium: Care Instructions. \"  Current as of: March 28, 2018  Content Version: 11.9  © 9626-8266 [employer].  Care instructions adapted under license by [employer] (which disclaims liability or warranty for this information). If you have questions about a medical condition or this instruction, always ask your healthcare professional. [place] [address] any warranty or liability for your use of this information.

## 2019-03-15 NOTE — PROGRESS NOTES
Vega Rodriguez is a 59 y.o. male (: 1960) presenting to address:    Chief Complaint   Patient presents with    Physical     Here for Physical.       Vitals:    03/15/19 1250   BP: 130/80   Pulse: 76   Resp: 16   Temp: 98.2 °F (36.8 °C)   TempSrc: Oral   SpO2: 95%   Weight: 264 lb 9.6 oz (120 kg)   Height: 6' 1\" (1.854 m)   PainSc:   0 - No pain       Hearing/Vision:   No exam data present    Learning Assessment:     Learning Assessment 2016   PRIMARY LEARNER Patient   HIGHEST LEVEL OF EDUCATION - PRIMARY LEARNER  -   BARRIERS PRIMARY LEARNER -   CO-LEARNER CAREGIVER -   PRIMARY LANGUAGE ENGLISH   LEARNER PREFERENCE PRIMARY READING   ANSWERED BY pt   RELATIONSHIP SELF     Depression Screening:     3 most recent PHQ Screens 3/15/2019   Little interest or pleasure in doing things Not at all   Feeling down, depressed, irritable, or hopeless Not at all   Total Score PHQ 2 0     Fall Risk Assessment:   No flowsheet data found. Abuse Screening:   No flowsheet data found. Coordination of Care Questionaire:   1. Have you been to the ER, urgent care clinic since your last visit? Hospitalized since your last visit? NO    2. Have you seen or consulted any other health care providers outside of the [address] [place] [address] since your last visit? Include any pap smears or colon screening. YES    Advanced Directive:   1. Do you have an Advanced Directive? NO    2. Would you like information on Advanced Directives?  NO

## 2019-04-05 DIAGNOSIS — E78.2 MIXED HYPERLIPIDEMIA: ICD-10-CM

## 2019-04-05 RX ORDER — ATORVASTATIN CALCIUM 80 MG/1
TABLET, FILM COATED ORAL
Qty: 90 TAB | Refills: 3 | Status: SHIPPED | OUTPATIENT
Start: 2019-04-05 | End: 2020-04-01

## 2019-06-07 DIAGNOSIS — E78.5 DYSLIPIDEMIA: ICD-10-CM

## 2019-06-07 RX ORDER — EZETIMIBE 10 MG/1
TABLET ORAL
Qty: 90 TAB | Refills: 3 | Status: SHIPPED | OUTPATIENT
Start: 2019-06-07 | End: 2020-03-10

## 2019-06-15 DIAGNOSIS — E78.2 MIXED HYPERLIPIDEMIA: ICD-10-CM

## 2019-06-16 RX ORDER — METOPROLOL SUCCINATE 25 MG/1
TABLET, EXTENDED RELEASE ORAL
Qty: 90 TAB | Refills: 3 | Status: SHIPPED | OUTPATIENT
Start: 2019-06-16 | End: 2020-03-17

## 2019-08-07 DIAGNOSIS — I10 ESSENTIAL HYPERTENSION: ICD-10-CM

## 2019-08-07 RX ORDER — LOSARTAN POTASSIUM 100 MG/1
TABLET ORAL
Qty: 90 TAB | Refills: 3 | Status: SHIPPED | OUTPATIENT
Start: 2019-08-07 | End: 2020-05-07

## 2019-08-30 DIAGNOSIS — E55.9 VITAMIN D DEFICIENCY: ICD-10-CM

## 2019-08-30 RX ORDER — ERGOCALCIFEROL 1.25 MG/1
CAPSULE ORAL
Qty: 12 CAP | Refills: 3 | Status: SHIPPED | OUTPATIENT
Start: 2019-08-30 | End: 2020-05-27

## 2019-10-10 ENCOUNTER — TELEPHONE (OUTPATIENT)
Dept: FAMILY MEDICINE CLINIC | Age: 59
End: 2019-10-10

## 2019-10-10 DIAGNOSIS — E78.2 MIXED HYPERLIPIDEMIA: ICD-10-CM

## 2019-10-10 DIAGNOSIS — I10 ESSENTIAL HYPERTENSION: Primary | ICD-10-CM

## 2019-10-10 DIAGNOSIS — Z87.898 HISTORY OF PREDIABETES: ICD-10-CM

## 2019-10-10 DIAGNOSIS — E55.9 VITAMIN D DEFICIENCY: ICD-10-CM

## 2019-10-10 DIAGNOSIS — E78.5 DYSLIPIDEMIA: ICD-10-CM

## 2019-10-10 NOTE — TELEPHONE ENCOUNTER
Pt is scheduled for an upcoming appt for a 6 month f/u . Pt would like to come in tomorrow  for the labs. Please review his chart and order labs accordingly.      Future Appointments   Date Time Provider Tameka De Paz   10/17/2019  2:30 PM Colleen Nichols MD [facility]

## 2019-10-11 LAB
25(OH)D3 SERPL-MCNC: 68.1 NG/ML (ref 32–100)
ANION GAP SERPL CALC-SCNC: 12 MMOL/L
AVG GLU, 10930: 113 MG/DL (ref 91–123)
BUN SERPL-MCNC: 14 MG/DL (ref 6–22)
CALCIUM SERPL-MCNC: 9.2 MG/DL (ref 8.4–10.4)
CHLORIDE SERPL-SCNC: 103 MMOL/L (ref 98–110)
CHOLEST SERPL-MCNC: 122 MG/DL (ref 110–200)
CO2 SERPL-SCNC: 26 MMOL/L (ref 20–32)
CREAT SERPL-MCNC: 0.6 MG/DL (ref 0.8–1.6)
GFRAA, 66117: >60
GFRNA, 66118: >60
GLUCOSE SERPL-MCNC: 127 MG/DL (ref 70–99)
HBA1C MFR BLD HPLC: 5.6 % (ref 4.8–5.9)
HDLC SERPL-MCNC: 3.1 MG/DL (ref 0–5)
HDLC SERPL-MCNC: 39 MG/DL (ref 40–59)
LDLC SERPL CALC-MCNC: 61 MG/DL (ref 50–99)
POTASSIUM SERPL-SCNC: 4.6 MMOL/L (ref 3.5–5.5)
SODIUM SERPL-SCNC: 141 MMOL/L (ref 133–145)
TRIGL SERPL-MCNC: 111 MG/DL (ref 40–149)
VLDLC SERPL CALC-MCNC: 22 MG/DL (ref 8–30)

## 2019-10-17 ENCOUNTER — OFFICE VISIT (OUTPATIENT)
Dept: FAMILY MEDICINE CLINIC | Age: 59
End: 2019-10-17

## 2019-10-17 VITALS
RESPIRATION RATE: 16 BRPM | BODY MASS INDEX: 35.84 KG/M2 | DIASTOLIC BLOOD PRESSURE: 76 MMHG | HEIGHT: 73 IN | WEIGHT: 270.4 LBS | TEMPERATURE: 97.1 F | SYSTOLIC BLOOD PRESSURE: 141 MMHG | HEART RATE: 79 BPM | OXYGEN SATURATION: 95 %

## 2019-10-17 DIAGNOSIS — E66.01 SEVERE OBESITY WITH BODY MASS INDEX (BMI) OF 35.0 TO 39.9 WITH SERIOUS COMORBIDITY (HCC): ICD-10-CM

## 2019-10-17 DIAGNOSIS — E55.9 VITAMIN D DEFICIENCY: ICD-10-CM

## 2019-10-17 DIAGNOSIS — J44.9 CHRONIC OBSTRUCTIVE PULMONARY DISEASE, UNSPECIFIED COPD TYPE (HCC): ICD-10-CM

## 2019-10-17 DIAGNOSIS — Z00.00 ROUTINE GENERAL MEDICAL EXAMINATION AT A HEALTH CARE FACILITY: ICD-10-CM

## 2019-10-17 DIAGNOSIS — M25.561 CHRONIC PAIN OF BOTH KNEES: ICD-10-CM

## 2019-10-17 DIAGNOSIS — I25.10 CORONARY ARTERY DISEASE INVOLVING NATIVE CORONARY ARTERY OF NATIVE HEART WITHOUT ANGINA PECTORIS: ICD-10-CM

## 2019-10-17 DIAGNOSIS — E78.2 MIXED HYPERLIPIDEMIA: ICD-10-CM

## 2019-10-17 DIAGNOSIS — Z87.898 HISTORY OF PREDIABETES: ICD-10-CM

## 2019-10-17 DIAGNOSIS — G89.29 CHRONIC PAIN OF BOTH KNEES: ICD-10-CM

## 2019-10-17 DIAGNOSIS — M25.562 CHRONIC PAIN OF BOTH KNEES: ICD-10-CM

## 2019-10-17 DIAGNOSIS — I10 ESSENTIAL HYPERTENSION: Primary | ICD-10-CM

## 2019-10-17 DIAGNOSIS — E66.9 OBESITY (BMI 30-39.9): ICD-10-CM

## 2019-10-17 DIAGNOSIS — I77.9 AORTA DISORDER (HCC): ICD-10-CM

## 2019-10-17 DIAGNOSIS — R06.09 DOE (DYSPNEA ON EXERTION): ICD-10-CM

## 2019-10-17 DIAGNOSIS — J84.10 PULMONARY FIBROSIS (HCC): ICD-10-CM

## 2019-10-17 RX ORDER — ALBUTEROL SULFATE 90 UG/1
1 AEROSOL, METERED RESPIRATORY (INHALATION)
Qty: 1 INHALER | Refills: 5 | Status: SHIPPED | OUTPATIENT
Start: 2019-10-17 | End: 2021-01-19 | Stop reason: SDUPTHER

## 2019-10-17 NOTE — PROGRESS NOTES
HISTORY OF PRESENT ILLNESS  Ifeanyi Long is a 62 y.o. male. Mr. Bebo Oseguera presents for follow-up of multiple chronic health problems including hypertension, hyperlipidemia, coronary artery disease, pulmonary fibrosis    Follow Up Chronic Condition   The history is provided by the patient and medical records. Associated symptoms include shortness of breath (progressing). Pertinent negatives include no chest pain and no abdominal pain. Review of Systems   Constitutional: Negative for fever and weight loss. Eyes: Negative for discharge. Respiratory: Positive for shortness of breath (progressing). Negative for cough and wheezing. Prefers to defer pulmonary medicine evaluation pending his cardiology appointment results   Cardiovascular: Negative for chest pain, palpitations, orthopnea and leg swelling. Cardiology follow-up scheduled   Gastrointestinal: Negative for abdominal pain. Musculoskeletal: Positive for joint pain (bilateral knee pain, getting worse). Neurological: Positive for tingling (numb fingers left hand - tingling right hand has already had a carpal tunnel release on the left). Psychiatric/Behavioral: Negative for depression and suicidal ideas. The patient is not nervous/anxious. Visit Vitals  /76 (BP 1 Location: Left arm, BP Patient Position: Sitting)   Pulse 79   Temp 97.1 °F (36.2 °C) (Oral)   Resp 16   Ht 6' 1\" (1.854 m)   Wt 270 lb 6.4 oz (122.7 kg)   SpO2 95%   BMI 35.67 kg/m²       Physical Exam   Constitutional: He is oriented to person, place, and time. He appears well-developed and well-nourished. 6 pound additional weight gain in the past 7 months   HENT:   Head: Normocephalic. Eyes: EOM are normal.   Neck: Neck supple. Cardiovascular: Normal rate, regular rhythm and normal heart sounds. Pulmonary/Chest: Effort normal and breath sounds normal.   Abdominal: Soft. There is no tenderness. Musculoskeletal: He exhibits no edema.    Bilateral knees with no effusion or joint line tenderness, negative anterior drawer, no varus or valgus instability, negative patellar grind   Neurological: He is alert and oriented to person, place, and time. Positive Tinel's sign right wrist   Skin: Skin is warm and dry. Psychiatric: He has a normal mood and affect. His behavior is normal.   Nursing note and vitals reviewed. Results for Caitlyn Scanlon (MRN [id number]) as of 10/17/2019 12:22   Ref. Range 9/12/2018 08:35 3/7/2019 08:41 10/11/2019 08:11   Sodium Latest Ref Range: 133 - 145 mmol/L 142 136 141   Potassium Latest Ref Range: 3.5 - 5.5 mmol/L 4.1 4.3 4.6   Chloride Latest Ref Range: 98 - 110 mmol/L 100 96 (L) 103   CO2 Latest Ref Range: 20 - 32 mmol/L 20 23 26   Anion gap Latest Units: mmol/L 22.0 17.0 12.0   Glucose Latest Ref Range: 70 - 99 mg/dL 137 (H) 108 (H) 127 (H)   BUN Latest Ref Range: 6 - 22 mg/dL 22 20 14   Creatinine Latest Ref Range: 0.8 - 1.6 mg/dL 0.7 0.7 (L) 0.6 (L)        Results for Caitlyn Scanlon (MRN [id number]) as of 10/17/2019 12:22   Ref. Range 9/12/2018 08:35 3/7/2019 08:41 10/11/2019 08:11   Triglyceride Latest Ref Range: 40 - 149 mg/dL 86 95 111   Cholesterol, total Latest Ref Range: 110 - 200 mg/dL 127 127 122   HDL Cholesterol Latest Ref Range: 40 - 59 mg/dL 41 37 (L) 39 (L)   CHOLESTEROL/HDL Latest Ref Range: 0.0 - 5.0  3.1 3.4 3.1   LDL, calculated Latest Ref Range: 50 - 99 mg/dL 69 72 61   VLDL, calculated Latest Ref Range: 8 - 30 mg/dL 17 19 22   Hemoglobin A1c, (calculated) Latest Ref Range: 4.8 - 5.9 % 5.8 5.3 5.6   TSH Latest Ref Range: 0.27 - 4.20 mcU/mL  1.24      ASSESSMENT and PLAN    ICD-10-CM ICD-9-CM    1. Essential hypertension I10 401.9    2. Mixed hyperlipidemia E78.2 272.2    3. Coronary artery disease involving native coronary artery of native heart without angina pectoris I25.10 414.01    4. History of prediabetes Z87.898 V12.29    5. Pulmonary fibrosis J84.10 515    6. Vitamin D deficiency E55.9 268.9    7.  Obesity (BMI 30-39. 9) E66.9 278.00    8. Aorta disorder ([facility]) I77.9 447.9    9. Chronic obstructive pulmonary disease, unspecified COPD type ([facility]) J44.9 496 albuterol (PROVENTIL HFA, VENTOLIN HFA, PROAIR HFA) 90 mcg/actuation inhaler   10. Severe obesity with body mass index (BMI) of 35.0 to 39.9 with serious comorbidity ([facility]) E66.01 278.01    11. SMITH (dyspnea on exertion) R06.09 786.09 albuterol (PROVENTIL HFA, VENTOLIN HFA, PROAIR HFA) 90 mcg/actuation inhaler   12. Chronic pain of both knees M25.561 719.46 XR KNEES BI AP LAT    M25.562 338.29 CANCELED: AMB POC X-RAY KNEE BILAT STANDING    G89.29     Knee x-rays today other disposition pending results if indicated  Continue current medications  Wear wrist splints for carpal tunnel disorder to prevent permanent disability  Avoid dietary salt, starch and sugar and is much as possible follow a program of regular aerobic exercise  Schedule annual physical exam in 6 months and come by for fasting lab several days prior to the appointment. Follow-up sooner with any problems.

## 2019-10-17 NOTE — PATIENT INSTRUCTIONS
Knee x-rays today other disposition pending results if indicated Continue current medications Wear wrist splints for carpal tunnel disorder to prevent permanent disability Avoid dietary salt, starch and sugar and is much as possible follow a program of regular aerobic exercise Schedule annual physical exam in 6 months and come by for fasting lab several days prior to the appointment. Follow-up sooner with any problems. Knee Arthritis: Exercises Introduction Here are some examples of exercises for you to try. The exercises may be suggested for a condition or for rehabilitation. Start each exercise slowly. Ease off the exercises if you start to have pain. You will be told when to start these exercises and which ones will work best for you. How to do the exercises Knee flexion with heel slide 1. Lie on your back with your knees bent. 2. Slide your heel back by bending your affected knee as far as you can. Then hook your other foot around your ankle to help pull your heel even farther back. 3. Hold for about 6 seconds, then rest for up to 10 seconds. 4. Repeat 8 to 12 times. 5. Switch legs and repeat steps 1 through 4, even if only one knee is sore. [employer] 1. Sit with your affected leg straight and supported on the floor or a firm bed. Place a small, rolled-up towel under your knee. Your other leg should be bent, with that foot flat on the floor. 2. Tighten the thigh muscles of your affected leg by pressing the back of your knee down into the towel. 3. Hold for about 6 seconds, then rest for up to 10 seconds. 4. Repeat 8 to 12 times. 5. Switch legs and repeat steps 1 through 4, even if only one knee is sore. Straight-leg raises to the front 1. Lie on your back with your good knee bent so that your foot rests flat on the floor. Your affected leg should be straight. Make sure that your low back has a normal curve.  You should be able to slip your hand in between the floor and the small of your back, with your palm touching the floor and your back touching the back of your hand. 2. Tighten the thigh muscles in your affected leg by pressing the back of your knee flat down to the floor. Hold your knee straight. 3. Keeping the thigh muscles tight and your leg straight, lift your affected leg up so that your heel is about 12 inches off the floor. Hold for about 6 seconds, then lower slowly. 4. Relax for up to 10 seconds between repetitions. 5. Repeat 8 to 12 times. 6. Switch legs and repeat steps 1 through 5, even if only one knee is sore. Active knee flexion 1. Lie on your stomach with your knees straight. If your kneecap is uncomfortable, roll up a washcloth and put it under your leg just above your kneecap. 2. Lift the foot of your affected leg by bending the knee so that you bring the foot up toward your buttock. If this motion hurts, try it without bending your knee quite as far. This may help you avoid any painful motion. 3. Slowly move your leg up and down. 4. Repeat 8 to 12 times. 5. Switch legs and repeat steps 1 through 4, even if only one knee is sore. Quadriceps stretch (facedown) 1. Lie flat on your stomach, and rest your face on the floor. 2. Wrap a towel or belt strap around the lower part of your affected leg. Then use the towel or belt strap to slowly pull your heel toward your buttock until you feel a stretch. 3. Hold for about 15 to 30 seconds, then relax your leg against the towel or belt strap. 4. Repeat 2 to 4 times. 5. Switch legs and repeat steps 1 through 4, even if only one knee is sore. Stationary exercise bike 1. If you do not have a stationary exercise bike at home, you can find one to ride at your local health club or community center. 2. Adjust the height of the bike seat so that your knee is slightly bent when your leg is extended downward.  If your knee hurts when the pedal reaches the top, you can raise the seat so that your knee does not bend as much. 3. Start slowly. At first, try to do 5 to 10 minutes of cycling with little to no resistance. Then increase your time and the resistance bit by bit until you can do 20 to 30 minutes without pain. 4. If you start to have pain, rest your knee until your pain gets back to the level that is normal for you. Or cycle for less time or with less effort. Follow-up care is a key part of your treatment and safety. Be sure to make and go to all appointments, and call your doctor if you are having problems. It's also a good idea to know your test results and keep a list of the medicines you take. Where can you learn more? Go to http://delon-timur.info/. Enter C159 in the search box to learn more about \"Knee Arthritis: Exercises. \" Current as of: June 26, 2019 Content Version: 12.2 © 7161-8826 [employer]. Care instructions adapted under license by [employer] (which disclaims liability or warranty for this information). If you have questions about a medical condition or this instruction, always ask your healthcare professional. [address] any warranty or liability for your use of this information. Counting Carbohydrates: Care Instructions Your Care Instructions You don't have to eat special foods when you have diabetes. You just have to be careful to eat healthy foods. Carbohydrates (carbs) raise blood sugar higher and quicker than any other nutrient. Carbs are found in desserts, breads and cereals, and fruit. They're also in starchy vegetables. These include potatoes, corn, and grains such as rice and pasta. Carbs are also in milk and yogurt. The more carbs you eat at one time, the higher your blood sugar will rise. Spreading carbs all through the day helps keep your blood sugar levels within your target range. Counting carbs is one of the best ways to keep your blood sugar under control. If you use insulin, counting carbs helps you match the right amount of insulin to the number of grams of carbs in a meal. Then you can change your diet and insulin dose as needed. Testing your blood sugar several times a day can help you learn how carbs affect your blood sugar. A registered dietitian or certified diabetes educator can help you plan meals and snacks. Follow-up care is a key part of your treatment and safety. Be sure to make and go to all appointments, and call your doctor if you are having problems. It's also a good idea to know your test results and keep a list of the medicines you take. How can you care for yourself at home? Know your daily amount of carbohydrates Your daily amount depends on several things, such as your weight, how active you are, which diabetes medicines you take, and what your goals are for your blood sugar levels. A registered dietitian or certified diabetes educator can help you plan how many carbs to include in each meal and snack. For most adults, a guideline for the daily amount of carbs is: · 45 to 60 grams at each meal. That's about the same as 3 to 4 carbohydrate servings. · 15 to 20 grams at each snack. That's about the same as 1 carbohydrate serving. Count carbs Counting carbs lets you know how much rapid-acting insulin to take before you eat. If you use an insulin pump, you get a constant rate of insulin during the day. So the pump must be programmed at meals. This gives you extra insulin to cover the rise in blood sugar after meals. If you take insulin: 
· Learn your own insulin-to-carb ratio. You and your diabetes health professional will figure out the ratio. You can do this by testing your blood sugar after meals. For example, you may need a certain amount of insulin for every 15 grams of carbs.  
· Add up the carb grams in a meal. Then you can figure out how many units of insulin to take based on your insulin-to-carb ratio. · Exercise lowers blood sugar. You can use less insulin than you would if you were not doing exercise. Keep in mind that timing matters. If you exercise within 1 hour after a meal, your body may need less insulin for that meal than it would if you exercised 3 hours after the meal. Test your blood sugar to find out how exercise affects your need for insulin. If you do or don't take insulin: 
· Look at labels on packaged foods. This can tell you how many carbs are in a serving. You can also use guides from the American Diabetes Association. · Be aware of portions, or serving sizes. If a package has two servings and you eat the whole package, you need to double the number of grams of carbohydrate listed for one serving. · Protein, fat, and fiber do not raise blood sugar as much as carbs do. If you eat a lot of these nutrients in a meal, your blood sugar will rise more slowly than it would otherwise. Eat from all food groups · Eat at least three meals a day. · Plan meals to include food from all the food groups. The food groups include grains, fruits, dairy, proteins, and vegetables. · Talk to your dietitian or diabetes educator about ways to add limited amounts of sweets into your meal plan. · If you drink alcohol, talk to your doctor. It may not be recommended when you are taking certain diabetes medicines. Where can you learn more? Go to http://delon-timur.info/. Enter D881 in the search box to learn more about \"Counting Carbohydrates: Care Instructions. \" Current as of: April 16, 2019 Content Version: 12.2 © 1403-2861 [employer]. Care instructions adapted under license by [employer] (which disclaims liability or warranty for this information).  If you have questions about a medical condition or this instruction, always ask your healthcare professional. [employer] disclaims any warranty or liability for your use of this information.

## 2019-10-17 NOTE — PROGRESS NOTES
Agustin Fagan is a 62 y.o. male (: 1960) presenting to address:    Chief Complaint   Patient presents with    Cholesterol Problem     will get flu vaccine from work     Hypertension    Knee Pain     bilateral knee pain       Vitals:    10/17/19 1418   BP: 141/76   Pulse: 79   Resp: 16   Temp: 97.1 °F (36.2 °C)   TempSrc: Oral   SpO2: 95%   Weight: 270 lb 6.4 oz (122.7 kg)   Height: 6' 1\" (1.854 m)   PainSc:   0 - No pain       Hearing/Vision:   No exam data present    Learning Assessment:     Learning Assessment 2016   PRIMARY LEARNER Patient   HIGHEST LEVEL OF EDUCATION - PRIMARY LEARNER  -   BARRIERS PRIMARY LEARNER -   CO-LEARNER CAREGIVER -   PRIMARY LANGUAGE ENGLISH   LEARNER PREFERENCE PRIMARY READING   ANSWERED BY pt   RELATIONSHIP SELF     Depression Screening:     3 most recent PHQ Screens 10/17/2019   Little interest or pleasure in doing things Not at all   Feeling down, depressed, irritable, or hopeless Not at all   Total Score PHQ 2 0     Fall Risk Assessment:   No flowsheet data found. Abuse Screening:   No flowsheet data found. Coordination of Care Questionaire:   1. Have you been to the ER, urgent care clinic since your last visit? Hospitalized since your last visit? NO    2. Have you seen or consulted any other health care providers outside of the [address] [place] [address] since your last visit? Include any pap smears or colon screening. NO    Advanced Directive:   1. Do you have an Advanced Directive? NO    2. Would you like information on Advanced Directives?  NO

## 2020-04-01 DIAGNOSIS — E55.9 VITAMIN D DEFICIENCY: ICD-10-CM

## 2020-04-01 DIAGNOSIS — Z87.898 HISTORY OF PREDIABETES: ICD-10-CM

## 2020-04-01 DIAGNOSIS — I10 ESSENTIAL HYPERTENSION: ICD-10-CM

## 2020-04-01 DIAGNOSIS — Z00.00 ROUTINE GENERAL MEDICAL EXAMINATION AT A HEALTH CARE FACILITY: ICD-10-CM

## 2020-04-01 DIAGNOSIS — E78.2 MIXED HYPERLIPIDEMIA: ICD-10-CM

## 2020-05-07 DIAGNOSIS — I10 ESSENTIAL HYPERTENSION: ICD-10-CM

## 2020-05-07 RX ORDER — LOSARTAN POTASSIUM 100 MG/1
TABLET ORAL
Qty: 90 TAB | Refills: 3 | Status: SHIPPED | OUTPATIENT
Start: 2020-05-07 | End: 2020-07-31

## 2020-05-27 DIAGNOSIS — E55.9 VITAMIN D DEFICIENCY: ICD-10-CM

## 2020-05-27 RX ORDER — ERGOCALCIFEROL 1.25 MG/1
CAPSULE ORAL
Qty: 12 CAP | Refills: 3 | Status: SHIPPED | OUTPATIENT
Start: 2020-05-27 | End: 2020-08-14

## 2020-06-03 DIAGNOSIS — E78.5 DYSLIPIDEMIA: ICD-10-CM

## 2020-06-03 RX ORDER — EZETIMIBE 10 MG/1
TABLET ORAL
Qty: 90 TAB | Refills: 4 | Status: SHIPPED | OUTPATIENT
Start: 2020-06-03 | End: 2021-09-01

## 2020-06-10 ENCOUNTER — VIRTUAL VISIT (OUTPATIENT)
Dept: FAMILY MEDICINE CLINIC | Age: 60
End: 2020-06-10

## 2020-06-10 DIAGNOSIS — I10 ESSENTIAL HYPERTENSION: Primary | ICD-10-CM

## 2020-06-10 DIAGNOSIS — J84.10 PULMONARY FIBROSIS (HCC): ICD-10-CM

## 2020-06-10 DIAGNOSIS — E78.2 MIXED HYPERLIPIDEMIA: ICD-10-CM

## 2020-06-10 DIAGNOSIS — E55.9 VITAMIN D DEFICIENCY: ICD-10-CM

## 2020-06-10 DIAGNOSIS — R73.03 PREDIABETES: ICD-10-CM

## 2020-06-10 DIAGNOSIS — I25.10 CORONARY ARTERY DISEASE INVOLVING NATIVE CORONARY ARTERY OF NATIVE HEART WITHOUT ANGINA PECTORIS: ICD-10-CM

## 2020-06-10 NOTE — PROGRESS NOTES
Paige Sommers is a 62 y.o. male who was seen by synchronous (real-time) audio-video technology using doxy. me on 6/10/2020. Mr#: [id number]    Consent:  He and/or his healthcare decision maker is aware that this patient-initiated Telehealth encounter is a billable service, with coverage as determined by his insurance carrier. He is aware that he may receive a bill and has provided verbal consent to proceed: YES    I was in the office while conducting this encounter. 36  HPI/Subjective:     Mr. Joslyn Em presents for follow-up of hypertension with coronary artery disease, hyperlipidemia, impaired glucose tolerance, and would have been due for his annual physical at this time. Today he reports no substantial change in his symptoms. He continues to be bothered by shortness of breath. He has a cardiology follow-up scheduled on 6/18/2020. Patient Active Problem List   Diagnosis Code    Essential hypertension I10    Mixed hyperlipidemia E78.2    Mitral regurgitation I34.0    Aortic aneurysm I71.9    Pulmonary fibrosis J84.10    Vitamin D deficiency E55.9    Bilateral carpal tunnel syndrome G56.03    Ex-smoker Z87.891    Granulomatous lung disease ([facility] [address] J84.10    Coronary artery disease involving native coronary artery of native heart without angina pectoris I25.10    Obesity (BMI 30-39. 9) E66.9    Prediabetes R73.03    Severe obesity (BMI 35.0-39. 9) E66.01            Allergies   Allergen Reactions    Brilinta [Ticagrelor] Other (comments)     dyspnea    Sildenafil Other (comments)     Flushing    Vardenafil Other (comments)     Flushing         Review of Systems   Constitutional: Positive for malaise/fatigue. Negative for chills, fever and weight loss. HENT: Negative for hearing loss. Eyes: Negative for blurred vision and double vision.    Respiratory: Positive for shortness of breath ( Shortness of breath persists, apparent history of pulmonary fibrosis, not clear if this is a symptom of a cardiac or pulmonary issue). Negative for cough and wheezing. Cardiovascular: Positive for chest pain (? tightness- sometimes with, sometimes without exertion, he notes that he can mow the lawn and that makes him extremely fatigued but he does not experience chest tightness). Negative for palpitations, orthopnea and leg swelling. Gastrointestinal: Negative for abdominal pain, constipation, diarrhea, heartburn, nausea and vomiting. Genitourinary: Negative for dysuria and urgency. Musculoskeletal: Positive for joint pain (bilateral knee pain-worse in AM). Negative for myalgias. Skin: Negative for itching and rash. Neurological: Positive for tingling (hands) and sensory change ( He reports numbness and tingling in the fingers of both hands, the pain resolved after a carpal tunnel procedure on one hand and has not occurred in the other he reports that he actually has dense numbness in his fingers that is chronic ). Negative for dizziness, focal weakness and headaches. Endo/Heme/Allergies: Negative for environmental allergies. Psychiatric/Behavioral: Negative for depression. The patient is not nervous/anxious and does not have insomnia.           PHYSICAL EXAMINATION:    Constitutional: [x] Appears well-developed and well-nourished [x] No apparent distress        Mental status: [x] Alert and awake  [x] Oriented t [x] Able to follow commands      Eyes:   EOM    [x]  Normal        HENT: [x] Normocephalic,      Pulmonary/Chest: [x] Respiratory effort normal   [x] No visualized signs of difficulty breathing or respiratory distress           Musculoskeletal:   [x] No obvious deformities noted with regard to areas viewed           Neurological:        [x] No apparent neurologic deficits noted in areas viewed                 Skin:        [x] No apparent dermatologic abnormalities noted in areas viewed               Psychiatric:       [x] Normal Affect      Other pertinent observable physical exam findings:-None noted    Results for Jose Antonio Dubois (MRN [id number]) as of 6/10/2020 12:36   Ref. Range 6/4/2020 09:51   WBC Latest Ref Range: 4.0 - 11.0 K/uL 6.2   RBC Latest Ref Range: 3.80 - 5.80 M/uL 4.73   HGB Latest Ref Range: 13.1 - 17.2 g/dL 14.9   HCT Latest Ref Range: 39.3 - 51.6 % 47.0   MCV Latest Ref Range: 80 - 95 fL 99 (H)   MCH Latest Ref Range: 26 - 34 pg 32   MCHC Latest Ref Range: 31 - 36 g/dL 32   RDW Latest Ref Range: 10.0 - 15.5 % 13.1   PLATELET Latest Ref Range: 140 - 440 K/uL 169   Results for Jose Antonio Dubois (MRN [id number]) as of 6/10/2020 12:36   Ref. Range 3/7/2019 08:41 10/11/2019 08:11 10/17/2019 15:08 6/4/2020 09:51   Specific Gravity Latest Ref Range: 1.005 - 1.03  1.021   1.020   pH (UA) Latest Ref Range: 5.0 - 8.0 pH 5.0   8.0   Protein Latest Ref Range: Negative, mg/dL Negative   30* (A)   Glucose Latest Ref Range: Negative mg/dL Negative   Negative   Ketone Latest Ref Range: Negative, mg/dL Negative   Negative   Blood Latest Ref Range: Negative  Negative   Negative   Bilirubin Latest Ref Range: Negative  Negative   Negative   Urobilinogen Latest Ref Range: <2.0 mg/dL <2.0   <2.0   Nitrites Latest Ref Range: Negative  Negative   Negative   Leukocyte Esterase Latest Ref Range: Negative  Negative   Negative   WBC Latest Ref Range: 0 - 2 /hpf    0-2   RBC Latest Ref Range: Negative, /hpf    0-2   Results for Jose Antonio Dubois (MRN [id number]) as of 6/10/2020 12:36   Ref.  Range 10/11/2019 08:11 10/17/2019 15:08 6/4/2020 09:51   Sodium Latest Ref Range: 133 - 145 mmol/L 141  137   Potassium Latest Ref Range: 3.5 - 5.5 mmol/L 4.6  4.3   Chloride Latest Ref Range: 98 - 110 mmol/L 103  99   CO2 Latest Ref Range: 20 - 32 mmol/L 26  25   Anion gap Latest Units: mmol/L 12.0  13.0   Glucose Latest Ref Range: 70 - 99 mg/dL 127 (H)  113 (H)   BUN Latest Ref Range: 6 - 22 mg/dL 14  17   Creatinine Latest Ref Range: 0.8 - 1.6 mg/dL 0.6 (L)  0.6 (L)   Calcium Latest Ref Range: 8.4 - 10.5 mg/dL 9.2  9.5 Results for Chrystal Baugh (MRN [id number]) as of 6/10/2020 12:36   Ref. Range 10/11/2019 08:11 10/17/2019 15:08 6/4/2020 09:51   Cholesterol, total Latest Ref Range: 110 - 200 mg/dL 122  128   HDL Cholesterol Latest Ref Range: >=40 mg/dL 39 (L)  40   CHOLESTEROL/HDL Latest Ref Range: 0.0 - 5.0  3.1  3.2   Non-HDL Cholesterol Latest Ref Range: <130 mg/dL   88   VLDL, calculated Latest Ref Range: 8 - 30 mg/dL 22  21   LDL, calculated Latest Ref Range: 50 - 99 mg/dL 61  67   LDL/HDL Ratio Unknown   1.7   Hemoglobin A1c, (calculated) Latest Ref Range: 4.8 - 5.6 % 5.6  5.8 (H)   TSH Latest Ref Range: 0.27 - 4.20 mcU/mL   1.69   Glucose Latest Ref Range: 70 - 99 mg/dL 127 (H)  113 (H)   Prostate Specific Ag Latest Ref Range: <=4.100 ng/mL   0.480   VITAMIN D, 25-HYDROXY Latest Ref Range: 32.0 - 100.0 ng/mL 68.1  51.0         Assessment & Plan:   Diagnoses and all orders for this visit:    1. Essential hypertension    2. Mixed hyperlipidemia    3. Coronary artery disease involving native coronary artery of native heart without angina pectoris    4. Pulmonary fibrosis    5. Prediabetes    6. Vitamin D deficiency      Health maintenance:      Continue current medications  Continue to avoid dietary salt, starch and sugar and follow a program of regular aerobic exercise  Return for follow-up in 6 months with fasting lab prior to the appointment  Follow-up sooner with any problems      I advised him to contact the office if his condition worsens, changes, fails to improve as anticipated and with any new problems. He expressed understanding with the diagnosis(es) and plan.           [facility], the patient is at home and the provider is at [facility] and [address] to the emergency declaration under the [facility], [facility] authority and the [employer] General Act, this Virtual  Visit was conducted, with patient's consent, to reduce the patient's risk of exposure to COVID-19 and provide continuity of care for an established patient. Services were provided through a video synchronous discussion virtually to substitute for in-person clinic visit. Byron Ge MD         PLEASE NOTE:   This document has been produced using voice recognition software. Unrecognized errors in transcription may be present.

## 2020-06-18 ENCOUNTER — OFFICE VISIT (OUTPATIENT)
Dept: CARDIOLOGY CLINIC | Age: 60
End: 2020-06-18

## 2020-06-18 VITALS
SYSTOLIC BLOOD PRESSURE: 153 MMHG | BODY MASS INDEX: 36.34 KG/M2 | OXYGEN SATURATION: 97 % | HEART RATE: 89 BPM | TEMPERATURE: 97.8 F | RESPIRATION RATE: 18 BRPM | WEIGHT: 274.2 LBS | HEIGHT: 73 IN | DIASTOLIC BLOOD PRESSURE: 91 MMHG

## 2020-06-18 DIAGNOSIS — I71.20 THORACIC AORTIC ANEURYSM WITHOUT RUPTURE: Primary | ICD-10-CM

## 2020-06-18 DIAGNOSIS — I20.0 UNSTABLE ANGINA PECTORIS (HCC): ICD-10-CM

## 2020-06-18 DIAGNOSIS — R06.09 DYSPNEA ON EXERTION: ICD-10-CM

## 2020-06-18 NOTE — PATIENT INSTRUCTIONS
Echo and Nuclear Stress- 
Please call central scheduling at [phone] All testing/lab work is completed at [facility] -[facility] [address] Other CTA- Riccardo Will contact you for an appointment Medication Changes Start taking Toprol 25 mg XL twice a day

## 2020-06-18 NOTE — PROGRESS NOTES
Mr. Rebecca Gonzalez is a pleasant 62 y.o. male with history of coronary artery disease, status post LAD and OM branch stent in April, 2016, aortic aneurysm, mitral regurgitation, dyslipidemia and hypertension. Mr. Rebecca Gonzalez is here today for follow up appointment. He denies any symptoms to suggest angina. He has mild dyspnea on moderate exertion. This has remained stable. He admits that he is not performing any regular exercise and he has gained some weight. He denies any chest pain or chest tightness. He denies PND. He denies lower extremity swelling. He takes his medications regularly. He has not used any nitroglycerin since last visit. Past Medical History:   Diagnosis Date    Aortic aneurysm     Coronary artery disease     LAD - 4.0 x 18mm XIENCE 4/16, OM1 - 4.0 x 15mm XIENCE 4/16     Dyslipidemia     Granuloma annulare     Hypertension     Mitral regurgitation     moderate (TTE 02/17),  moderate (ANITA 4/16)    Pulmonary fibrosis     upper lobes bilat (CTA 12/16)    Remote tobacco use     quit 2012    Seborrheic dermatitis         Past Surgical History:   Procedure Laterality Date    HX CERVICAL FUSION      cervical fusion    HX ORTHOPAEDIC      prior (L) knee surgery       Current Outpatient Medications   Medication Sig    ezetimibe (ZETIA) 10 mg tablet take 1 tablet by mouth once daily    Vitamin D2 1,250 mcg (50,000 unit) capsule take 1 capsule by mouth every week    losartan (COZAAR) 100 mg tablet take 1 tablet by mouth once daily    atorvastatin (LIPITOR) 80 mg tablet take 1 tablet by mouth once daily    metoprolol succinate (TOPROL-XL) 25 mg XL tablet take 1 tablet by mouth once daily    albuterol (PROVENTIL HFA, VENTOLIN HFA, PROAIR HFA) 90 mcg/actuation inhaler Take 1 Puff by inhalation every four (4) hours as needed for Wheezing.  nitroglycerin (NITROSTAT) 0.4 mg SL tablet 1 Tab by SubLINGual route every five (5) minutes as needed for Chest Pain.     ASPIRIN (ASPIR-81 PO) Take 81 mg by mouth two (2) times a day.  omega-3 fatty acids-vitamin e (FISH OIL) 1,000 mg cap Take 4,000 mg by mouth daily. No current facility-administered medications for this visit. Allergies and Intolerances: Allergies   Allergen Reactions    Brilinta [Ticagrelor] Other (comments)     dyspnea    Sildenafil Other (comments)     Flushing    Vardenafil Other (comments)     Flushing          Family History:    Heart Disease Father        Social History:   He  reports that he quit smoking about 6 years ago. His smoking use included cigarettes. He has a 40.00 pack-year smoking history. He has never used smokeless tobacco.  He  reports current alcohol use. Physical:   Vitals:   BP:    HR:    Wt:      Exam:   General:          Middle aged  gentleman, no complaints, no distress.     Head/Neck:     No JVD                           No bruits. Lungs:             HD respiratory distress.                             Clear with good effort. Heart:              Regular.  II/VI systolic murmur.   Abdomen:       Soft. Bowel sounds present  Extremities:     Intact pulses.                             TW edema.     Neurological:   Alert and oriented to person, place, time.                             No gross neurological deficit. Skin:  Warm and dry.     Review of Data:    LABS:   Lab Results   Component Value Date/Time    WBC 6.2 06/04/2020 09:51 AM    HGB 14.9 06/04/2020 09:51 AM    HCT 47.0 06/04/2020 09:51 AM    PLATELET 335 62/93/9390 09:51 AM    MCV 99 (H) 06/04/2020 09:51 AM     Lab Results   Component Value Date/Time    Sodium 137 06/04/2020 09:51 AM    Potassium 4.3 06/04/2020 09:51 AM    Chloride 99 06/04/2020 09:51 AM    CO2 25 06/04/2020 09:51 AM    Anion gap 13.0 06/04/2020 09:51 AM    Glucose 113 (H) 06/04/2020 09:51 AM    BUN 17 06/04/2020 09:51 AM    Creatinine 0.6 (L) 06/04/2020 09:51 AM    BUN/Creatinine ratio 19 04/30/2016 04:20 AM    GFR est AA >60 04/30/2016 04:20 AM    GFR est non-AA >60 04/30/2016 04:20 AM    Calcium 9.5 06/04/2020 09:51 AM    Bilirubin, total 0.6 06/04/2020 09:51 AM    Alk. phosphatase 81 06/04/2020 09:51 AM    Protein, total 7.3 06/04/2020 09:51 AM    Albumin 4.5 06/04/2020 09:51 AM    Globulin 2.8 06/04/2020 09:51 AM    A-G Ratio 1.6 06/04/2020 09:51 AM    ALT (SGPT) 32 06/04/2020 09:51 AM     Lab Results   Component Value Date/Time    Cholesterol, total 128 06/04/2020 09:51 AM    HDL Cholesterol 40 06/04/2020 09:51 AM    LDL, calculated 67 06/04/2020 09:51 AM    VLDL, calculated 21 06/04/2020 09:51 AM    Triglyceride 104 06/04/2020 09:51 AM     Lab Results   Component Value Date/Time    Hemoglobin A1c 5.8 (H) 06/04/2020 09:51 AM    Hemoglobin A1c (POC) 5.5 09/15/2017 01:18 PM     EKG:  April 2016:  Sinus rhythm, 54 beats per minute. NSTT. TRANSTHORACIC ECHOCARDIOGRAM: (07/18)  · Estimated left ventricular ejection fraction is 51 - 55%. Left ventricular mild concentric hypertrophy observed. Normal left ventricular wall motion, no regional wall motion abnormality noted. Mild (grade 1) left ventricular diastolic dysfunction. · Right ventricle not well visualized. · Aortic valve is probably trileaflet. · Mitral valve non-specific thickening. Mild mitral annular calcification. Mild to moderate mitral valve regurgitation. · Mild tricuspid valve regurgitation is present. Pulmonary arterial systolic pressure is 30 mmHg. Mild pulmonary hypertension is present. · Mildly elevated central venous pressure (5-10 mmHg); IVC diameter is less than 21 mm and collapses less than 50% with respiration.        TRANSESOPHAGEAL ECHOCARDIOGRAM: April 2016:   LEFT VENTRICLE: Size was normal. Systolic function was normal.  RIGHT VENTRICLE: The size was normal. Systolic function was normal.  LEFT ATRIUM: Size was normal. No thrombus was identified. There was no  spontaneous echo contrast (\"smoke\"). APPENDAGE: The size was normal. No  thrombus was identified.  There was no spontaneous echo contrast (\"smoke\")  in the appendage. DOPPLER: The function was normal (normal emptying  velocity). ATRIAL SEPTUM: There was no evidence of intracardiac shunt by  peripherally-administered agitated saline contrast.  RIGHT ATRIUM: Size was normal.  MITRAL VALVE: There was minimal thickening. There was minimal  calcification. There appeared to be a partial flail of the P2 segment of  the posterior leaflet of the mitral valve. Torn chordae were not  visualized. The papillary muscle appeared to be intact. There was moderate  eccentric mitral regurgitation along the atrial aspect of the anterior  leaflet and along the atrial septum. There was no evidence for pulmonary  vein inflow reversal.    CTA CHEST w/wo: December 2015:  Dilated ascending thoracic aorta with maximal dimensions at the level of right pulmonary artery, 41 x 43 mm.    Severe multifocal coronary artery disease. Please see details above. Hepatic steatosis. Evidence of old healed granulomatous disease. Mild fibrosis in the upper lobes. CT CHEST AORTOGRAM (2018)  CT  AORTOGRAM: Tricuspid aortic valve with mild atherosclerotic calcification. Normal caliber aortic root and isthmus. Aneurysmal ascending thoracic aorta  measures 4.1 cm. Normal caliber transverse thoracic aorta (3.1 cm) and  descending thoracic aorta (3.0 cm). Relatively minimal atherosclerotic disease  within transverse and descending thoracic aorta. More mild atherosclerotic  plaque is seen within the normal caliber upper thoracic aorta. No high-grade  stenosis within celiac or SMA axis origins. No aortic dissection, intramural  hematoma or other acute aortic pathology.     CATHETERIZATION: March and April 2016: (images and report personally reviewed)  LM - normal  LAD - 80-90% ostial/prox (4.0 x 18mm XIENCE 4/16)  D1 - 70%  Cx - 50-60% prox  OM1 - 70-80% mid (4.0 x 15mm XIENCE 4/16)  RCA - plaquing  RPDA - normal  RPLV - normal  EF 50%    MR 2+    STRESS TEST (01/17)  Fair exercise tolerance. Normal blood pressure response to exercise. Myocardial perfusion imaging is normal  There is no area of prior scarring or ongoing ischemia. Overall left ventricle systolic function was severely reduced  without regional wall motion abnormalities (as noted above). Risk/extent of ischemia: Low risk. IMPRESSION AND PLAN:    Coronary artery disease:   Mr. Lupe Santos had an LAD and OM branch stent in April of 2016. Stress test in 01/17 without on going ischemia. He does have documented 70% stenosis of first diagonal branch. But no angina currently. No S/L NTG since last time. Most of his symptoms are chronic and stable. For now continue aspirin and Toprol. S/L NTG as needed. Aortic aneurysm:    Mr. Lupe Santos had a cardiac CTA from December, 2015, which suggested ascending aortic aneurysm. The measurements are noted above. Repeat CTA in 2018 showed stable ascending aneurysm at 4.1 cm. Given his body habitus I do not believe that dimensions are not markedly abnormal.  For now I would recommend better blood pressure control and serial CAT scan in 2-3 years. Mitral regurgitation:  Mr. Lupe Santos had an echocardiogram, as well as ANITA in 2016, which showed moderate mitral regurgitation, likely from prolapse of the posterior leaflet. Last ECHO with mild - moderate MR in 05/18  Currently he does not have any fluid overload on exam.  He is on Cozaar and Toprol. He does not require any diuretics at this time. HTN:   BP today is 133/71 mm Hg. Continue BB and ARB. Obesity: Weight today is 274 lbs. Thinks he has gained weight. Working on diet and exercise to lose weight. Folllow up in 9 months.

## 2020-06-18 NOTE — PROGRESS NOTES
Mr. Vangie Wadsworth is a pleasant 62 y.o. male with history of coronary artery disease, status post LAD and OM branch stent in April, 2016, aortic aneurysm, mitral regurgitation, dyslipidemia and hypertension. Patient is here today to year after last appointment. Lately he has been experiencing worsening dyspnea on exertion. He admits that he has been not eating healthy food and eating outside. He also has been experiencing some chest pressure especially in the morning and usually last for 10 to 15 minutes. It resolves itself. It is not necessarily exertional.  There is no vomiting. Occasionally he feels nauseous. During the daytime he is able to perform activity however he gets short of breath easily. He denies any swelling. He denies PND.   He denies presyncope or syncope  He has not used any nitroglycerin    Past Medical History:   Diagnosis Date    Aortic aneurysm     Coronary artery disease     LAD - 4.0 x 18mm XIENCE 4/16, OM1 - 4.0 x 15mm XIENCE 4/16     Dyslipidemia     Granuloma annulare     Hypertension     Mitral regurgitation     moderate (TTE 02/17),  moderate (ANITA 4/16)    Pulmonary fibrosis     upper lobes bilat (CTA 12/16)    Remote tobacco use     quit 2012    Seborrheic dermatitis         Past Surgical History:   Procedure Laterality Date    HX CERVICAL FUSION      cervical fusion    HX ORTHOPAEDIC      prior (L) knee surgery       Current Outpatient Medications   Medication Sig    ezetimibe (ZETIA) 10 mg tablet take 1 tablet by mouth once daily    Vitamin D2 1,250 mcg (50,000 unit) capsule take 1 capsule by mouth every week    losartan (COZAAR) 100 mg tablet take 1 tablet by mouth once daily    atorvastatin (LIPITOR) 80 mg tablet take 1 tablet by mouth once daily    metoprolol succinate (TOPROL-XL) 25 mg XL tablet take 1 tablet by mouth once daily    albuterol (PROVENTIL HFA, VENTOLIN HFA, PROAIR HFA) 90 mcg/actuation inhaler Take 1 Puff by inhalation every four (4) hours as needed for Wheezing.  ASPIRIN (ASPIR-81 PO) Take 81 mg by mouth two (2) times a day.  omega-3 fatty acids-vitamin e (FISH OIL) 1,000 mg cap Take 4,000 mg by mouth daily.  nitroglycerin (NITROSTAT) 0.4 mg SL tablet 1 Tab by SubLINGual route every five (5) minutes as needed for Chest Pain. No current facility-administered medications for this visit. Allergies and Intolerances: Allergies   Allergen Reactions    Brilinta [Ticagrelor] Other (comments)     dyspnea    Sildenafil Other (comments)     Flushing    Vardenafil Other (comments)     Flushing          Family History:    Heart Disease Father        Social History:   He  reports that he quit smoking about 6 years ago. His smoking use included cigarettes. He has a 40.00 pack-year smoking history. He has never used smokeless tobacco.  He  reports current alcohol use. Physical:   Vitals:   BP: (!) 153/91  HR: 89  Wt: 274 lb 3.2 oz (124.4 kg)    Exam:   General:          Middle aged  gentleman, no complaints, no distress.     Head/Neck:     No JVD                           No bruits. Lungs:             VI respiratory distress.                             Clear with good effort. Heart:              Regular.  II/VI systolic murmur.   Abdomen:       Soft. Bowel sounds present  Extremities:     Intact pulses.                             TM edema.     Neurological:   Alert and oriented to person, place, time.                             No gross neurological deficit. Skin:  Warm and dry.     Review of Data:    LABS:   Lab Results   Component Value Date/Time    WBC 6.2 06/04/2020 09:51 AM    HGB 14.9 06/04/2020 09:51 AM    HCT 47.0 06/04/2020 09:51 AM    PLATELET 582 01/76/0307 09:51 AM    MCV 99 (H) 06/04/2020 09:51 AM     Lab Results   Component Value Date/Time    Sodium 137 06/04/2020 09:51 AM    Potassium 4.3 06/04/2020 09:51 AM    Chloride 99 06/04/2020 09:51 AM    CO2 25 06/04/2020 09:51 AM    Anion gap 13.0 06/04/2020 09:51 AM    Glucose 113 (H) 06/04/2020 09:51 AM    BUN 17 06/04/2020 09:51 AM    Creatinine 0.6 (L) 06/04/2020 09:51 AM    BUN/Creatinine ratio 19 04/30/2016 04:20 AM    GFR est AA >60 04/30/2016 04:20 AM    GFR est non-AA >60 04/30/2016 04:20 AM    Calcium 9.5 06/04/2020 09:51 AM    Bilirubin, total 0.6 06/04/2020 09:51 AM    Alk. phosphatase 81 06/04/2020 09:51 AM    Protein, total 7.3 06/04/2020 09:51 AM    Albumin 4.5 06/04/2020 09:51 AM    Globulin 2.8 06/04/2020 09:51 AM    A-G Ratio 1.6 06/04/2020 09:51 AM    ALT (SGPT) 32 06/04/2020 09:51 AM     Lab Results   Component Value Date/Time    Cholesterol, total 128 06/04/2020 09:51 AM    HDL Cholesterol 40 06/04/2020 09:51 AM    LDL, calculated 67 06/04/2020 09:51 AM    VLDL, calculated 21 06/04/2020 09:51 AM    Triglyceride 104 06/04/2020 09:51 AM     Lab Results   Component Value Date/Time    Hemoglobin A1c 5.8 (H) 06/04/2020 09:51 AM    Hemoglobin A1c (POC) 5.5 09/15/2017 01:18 PM     EKG:  April 2016:  Sinus rhythm, 54 beats per minute. NSTT. TRANSTHORACIC ECHOCARDIOGRAM: (07/18)  · Estimated left ventricular ejection fraction is 51 - 55%. Left ventricular mild concentric hypertrophy observed. Normal left ventricular wall motion, no regional wall motion abnormality noted. Mild (grade 1) left ventricular diastolic dysfunction. · Right ventricle not well visualized. · Aortic valve is probably trileaflet. · Mitral valve non-specific thickening. Mild mitral annular calcification. Mild to moderate mitral valve regurgitation. · Mild tricuspid valve regurgitation is present. Pulmonary arterial systolic pressure is 30 mmHg. Mild pulmonary hypertension is present.   · Mildly elevated central venous pressure (5-10 mmHg); IVC diameter is less than 21 mm and collapses less than 50% with respiration.        TRANSESOPHAGEAL ECHOCARDIOGRAM: April 2016:   LEFT VENTRICLE: Size was normal. Systolic function was normal.  RIGHT VENTRICLE: The size was normal. Systolic function was normal.  LEFT ATRIUM: Size was normal. No thrombus was identified. There was no  spontaneous echo contrast (\"smoke\"). APPENDAGE: The size was normal. No  thrombus was identified. There was no spontaneous echo contrast (\"smoke\")  in the appendage. DOPPLER: The function was normal (normal emptying  velocity). ATRIAL SEPTUM: There was no evidence of intracardiac shunt by  peripherally-administered agitated saline contrast.  RIGHT ATRIUM: Size was normal.  MITRAL VALVE: There was minimal thickening. There was minimal  calcification. There appeared to be a partial flail of the P2 segment of  the posterior leaflet of the mitral valve. Torn chordae were not  visualized. The papillary muscle appeared to be intact. There was moderate  eccentric mitral regurgitation along the atrial aspect of the anterior  leaflet and along the atrial septum. There was no evidence for pulmonary  vein inflow reversal.    CTA CHEST w/wo: December 2015:  Dilated ascending thoracic aorta with maximal dimensions at the level of right pulmonary artery, 41 x 43 mm.    Severe multifocal coronary artery disease. Please see details above. Hepatic steatosis. Evidence of old healed granulomatous disease. Mild fibrosis in the upper lobes. CT CHEST AORTOGRAM (2018)  CT  AORTOGRAM: Tricuspid aortic valve with mild atherosclerotic calcification. Normal caliber aortic root and isthmus. Aneurysmal ascending thoracic aorta  measures 4.1 cm. Normal caliber transverse thoracic aorta (3.1 cm) and  descending thoracic aorta (3.0 cm). Relatively minimal atherosclerotic disease  within transverse and descending thoracic aorta. More mild atherosclerotic  plaque is seen within the normal caliber upper thoracic aorta. No high-grade  stenosis within celiac or SMA axis origins. No aortic dissection, intramural  hematoma or other acute aortic pathology.     CATHETERIZATION: March and April 2016: (images and report personally reviewed)  LM - normal  LAD - 80-90% ostial/prox (4.0 x 18mm XIENCE 4/16)  D1 - 70%  Cx - 50-60% prox  OM1 - 70-80% mid (4.0 x 15mm XIENCE 4/16)  RCA - plaquing  RPDA - normal  RPLV - normal  EF 50%    MR 2+    STRESS TEST (01/17)  Fair exercise tolerance. Normal blood pressure response to exercise. Myocardial perfusion imaging is normal  There is no area of prior scarring or ongoing ischemia. Overall left ventricle systolic function was severely reduced  without regional wall motion abnormalities (as noted above). Risk/extent of ischemia: Low risk. IMPRESSION AND PLAN:    Coronary artery disease:   Mr. Tanja Arndt had an LAD and OM branch stent in April of 2016. Stress test in 01/17 without on going ischemia. He does have documented 70% stenosis of first diagonal branch. Now with worsening dyspnea and some occasional chest discomfort. Ischemia needs to be ruled out. We will proceed with echo, nuclear stress test  Have asked patient to take nitroglycerin as needed  Advised patient to avoid any exertional activity  Increase Toprol to 25 mg twice daily. Continue aspirin and statin    Aortic aneurysm:    Mr. Tanja Arndt had a cardiac CTA from December, 2015, which suggested ascending aortic aneurysm. The measurements are noted above. Repeat CTA in 2018 showed stable ascending aneurysm at 4.1 cm. Given his body habitus I do not believe that dimensions are not markedly abnormal.  We will repeat CT with contrast to check on aneurysm size    Mitral regurgitation:  Mr. Tanja Arndt had an echocardiogram, as well as ANITA in 2016, which showed moderate mitral regurgitation, likely from prolapse of the posterior leaflet. Last ECHO with mild - moderate MR in 05/18  Currently he does not have any fluid overload on exam.  He is on Cozaar and Toprol. Increase Toprol as mentioned above  Repeat echo because of worsening dyspnea    HTN:   BP today is elevated. Continue losartan. Increasing dose of Toprol to twice a day    Obesity: Weight today is 274 lbs.  Thinks he has gained weight. Working on diet and exercise to lose weight. Folllow up in 9 months.

## 2020-06-18 NOTE — PROGRESS NOTES
Judie Rudolph presents today for No chief complaint on file. Judie Rudolph preferred language for health care discussion is english/other. Is someone accompanying this pt? n    Is the patient using any DME equipment during OV? n    Depression Screening:  3 most recent PHQ Screens 10/17/2019   Little interest or pleasure in doing things Not at all   Feeling down, depressed, irritable, or hopeless Not at all   Total Score PHQ 2 0       Learning Assessment:  Learning Assessment 4/20/2016   PRIMARY LEARNER Patient   HIGHEST LEVEL OF EDUCATION - PRIMARY LEARNER  -   BARRIERS PRIMARY LEARNER -   CO-LEARNER CAREGIVER -   PRIMARY LANGUAGE ENGLISH   LEARNER PREFERENCE PRIMARY READING   ANSWERED BY pt   RELATIONSHIP SELF       Abuse Screening:  No flowsheet data found. Fall Risk  No flowsheet data found. Pt currently taking Anticoagulant therapy? n    Coordination of Care:  1. Have you been to the ER, urgent care clinic since your last visit? Hospitalized since your last visit? n    2. Have you seen or consulted any other health care providers outside of the [address] [place] [address] since your last visit? Include any pap smears or colon screening.  n

## 2020-07-07 ENCOUNTER — HOSPITAL ENCOUNTER (OUTPATIENT)
Dept: NON INVASIVE DIAGNOSTICS | Age: 60
Discharge: HOME | End: 2020-07-07
Attending: INTERNAL MEDICINE
Payer: COMMERCIAL

## 2020-07-07 ENCOUNTER — HOSPITAL ENCOUNTER (OUTPATIENT)
Dept: NUCLEAR MEDICINE | Age: 60
Discharge: HOME | End: 2020-07-07
Attending: INTERNAL MEDICINE
Payer: COMMERCIAL

## 2020-07-07 ENCOUNTER — HOSPITAL ENCOUNTER (OUTPATIENT)
Dept: CT IMAGING | Age: 60
Discharge: HOME | End: 2020-07-07
Attending: INTERNAL MEDICINE
Payer: COMMERCIAL

## 2020-07-07 VITALS
WEIGHT: 266 LBS | HEIGHT: 73 IN | DIASTOLIC BLOOD PRESSURE: 80 MMHG | SYSTOLIC BLOOD PRESSURE: 127 MMHG | BODY MASS INDEX: 35.25 KG/M2

## 2020-07-07 VITALS
HEIGHT: 73 IN | SYSTOLIC BLOOD PRESSURE: 153 MMHG | BODY MASS INDEX: 36.31 KG/M2 | DIASTOLIC BLOOD PRESSURE: 91 MMHG | WEIGHT: 274 LBS

## 2020-07-07 DIAGNOSIS — I20.0 UNSTABLE ANGINA PECTORIS (HCC): ICD-10-CM

## 2020-07-07 DIAGNOSIS — R06.09 DYSPNEA ON EXERTION: ICD-10-CM

## 2020-07-07 DIAGNOSIS — I71.20 THORACIC AORTIC ANEURYSM WITHOUT RUPTURE: ICD-10-CM

## 2020-07-07 LAB
ECHO AO ARCH DIAM: 3.47 CM
ECHO AO ASC DIAM: 3.72 CM
ECHO AO ROOT DIAM: 3.17 CM
ECHO IVC PROX: 1.9 CM
ECHO LA AREA 4C: 23.54 CM2
ECHO LA MAJOR AXIS: 4.49 CM
ECHO LA MINOR AXIS: 1.83 CM
ECHO LA TO AORTIC ROOT RATIO: 1.18
ECHO LA VOL 2C: 70.55 ML (ref 18–58)
ECHO LA VOL 4C: 75.69 ML (ref 18–58)
ECHO LA VOL BP: 79.45 ML (ref 18–58)
ECHO LA VOL/BSA BIPLANE: 32.3 ML/M2 (ref 16–28)
ECHO LA VOLUME INDEX A2C: 28.68 ML/M2 (ref 16–28)
ECHO LA VOLUME INDEX A4C: 30.77 ML/M2 (ref 16–28)
ECHO LV E' LATERAL VELOCITY: 10.46 CM/S
ECHO LV E' SEPTAL VELOCITY: 6.25 CM/S
ECHO LV EDV A2C: 143.19 ML
ECHO LV EDV A4C: 142.63 ML
ECHO LV EDV BP: 143.66 ML (ref 67–155)
ECHO LV EDV INDEX A4C: 58 ML/M2
ECHO LV EDV INDEX BP: 58.4 ML/M2
ECHO LV EDV NDEX A2C: 58.2 ML/M2
ECHO LV EJECTION FRACTION A2C: 46 PERCENT
ECHO LV EJECTION FRACTION A4C: 49 PERCENT
ECHO LV EJECTION FRACTION BIPLANE: 47.6 PERCENT (ref 55–100)
ECHO LV ESV A2C: 77.31 ML
ECHO LV ESV A4C: 72.48 ML
ECHO LV ESV BP: 75.25 ML (ref 22–58)
ECHO LV ESV INDEX A2C: 31.4 ML/M2
ECHO LV ESV INDEX A4C: 29.5 ML/M2
ECHO LV ESV INDEX BP: 30.6 ML/M2
ECHO LV INTERNAL DIMENSION DIASTOLIC: 5.47 CM (ref 4.2–5.9)
ECHO LV INTERNAL DIMENSION SYSTOLIC: 3.72 CM
ECHO LV IVSD: 1.24 CM (ref 0.6–1)
ECHO LV MASS 2D: 223.9 G (ref 88–224)
ECHO LV MASS INDEX 2D: 91 G/M2 (ref 49–115)
ECHO LV POSTERIOR WALL DIASTOLIC: 0.85 CM (ref 0.6–1)
ECHO LVOT DIAM: 2.3 CM
ECHO LVOT PEAK GRADIENT: 4.42 MMHG
ECHO LVOT SV: 100.7 ML
ECHO LVOT VTI: 24.17 CM
ECHO MV A VELOCITY: 90.45 CM/S
ECHO MV E DECELERATION TIME (DT): 0.27 S
ECHO MV E VELOCITY: 75.16 CM/S
ECHO MV E/A RATIO: 0.83
ECHO MV E/E' LATERAL: 7.19
ECHO MV E/E' RATIO (AVERAGED): 9.61
ECHO MV E/E' SEPTAL: 12.03
ECHO MV EROA PISA: 0.15 CM2
ECHO MV REGURGITANT RADIUS PISA: 0.55 CM
ECHO MV REGURGITANT VOLUME: 26.65 ML
ECHO MV REGURGITANT VTIA: 175.16 CM
ECHO PV REGURGITANT MAX VELOCITY: 497.29 CM/S
ECHO RV TAPSE: 1.62 CM (ref 1.5–2)
LVOT MG: 2.57 MMHG

## 2020-07-07 PROCEDURE — 74011250636 HC RX REV CODE- 250/636: Performed by: INTERNAL MEDICINE

## 2020-07-07 PROCEDURE — 93017 CV STRESS TEST TRACING ONLY: CPT

## 2020-07-07 PROCEDURE — 71260 CT THORAX DX C+: CPT

## 2020-07-07 PROCEDURE — A9500 TC99M SESTAMIBI: HCPCS

## 2020-07-07 PROCEDURE — 74011636320 HC RX REV CODE- 636/320: Performed by: INTERNAL MEDICINE

## 2020-07-07 PROCEDURE — C8929 TTE W OR WO FOL WCON,DOPPLER: HCPCS

## 2020-07-07 PROCEDURE — 74011000250 HC RX REV CODE- 250: Performed by: INTERNAL MEDICINE

## 2020-07-07 RX ADMIN — IOPAMIDOL 80 ML: 612 INJECTION, SOLUTION INTRAVENOUS at 07:13

## 2020-07-07 RX ADMIN — REGADENOSON 0.4 MG: 0.08 INJECTION, SOLUTION INTRAVENOUS at 09:45

## 2020-07-07 RX ADMIN — PERFLUTREN 1 ML: 6.52 INJECTION, SUSPENSION INTRAVENOUS at 08:36

## 2020-07-08 LAB
STRESS BASELINE HR: 74 BPM
STRESS ESTIMATED WORKLOAD: 1.4 METS
STRESS EXERCISE DUR MIN: NORMAL
STRESS PEAK DIAS BP: 75 MMHG
STRESS PEAK SYS BP: 157 MMHG
STRESS PERCENT HR ACHIEVED: 64 %
STRESS POST PEAK HR: 101 BPM
STRESS RATE PRESSURE PRODUCT: NORMAL BPM*MMHG
STRESS TARGET HR: 159 BPM

## 2020-07-13 ENCOUNTER — TELEPHONE (OUTPATIENT)
Dept: CARDIOLOGY CLINIC | Age: 60
End: 2020-07-13

## 2020-07-13 DIAGNOSIS — E78.2 MIXED HYPERLIPIDEMIA: ICD-10-CM

## 2020-07-13 RX ORDER — METOPROLOL SUCCINATE 25 MG/1
25 TABLET, EXTENDED RELEASE ORAL DAILY
Qty: 90 TAB | Refills: 3 | Status: CANCELLED | OUTPATIENT
Start: 2020-07-13

## 2020-07-13 NOTE — TELEPHONE ENCOUNTER
Attempted to contact pt at  number, no answer. Lvm for pt to return call to office at [phone]. Will continue to try to contact pt. Re:  Provider requested CT at [place] [address] instead of CTA at [facility]. Pt sated he had metoprolol 25 xl on hand and would call us once he became low on medicaiton for refill. Verbal order and read back per Yamile Castillo MD  EF slightly decreased from previous study.  Nuclear Stress Normal.

## 2020-07-13 NOTE — TELEPHONE ENCOUNTER
Pt called and requested most recent testing results. Pt also had a question about CTA that was supposed to be scheduled with [facility], he said that he never received a call from them to schedule. Pt also stated he was supposed to start taking Toprol 25mg twice daily, however it was never sent to the pharmacy.

## 2020-07-15 NOTE — TELEPHONE ENCOUNTER
Contacted pt at [facility] number. Two patient Identifiers confirmed. Advised pt per Dr Gregory Werner notes and notes regarding medication refill. Pts stated he is now low and needs a refill. Pt stated he was confused about the CTA and thought it was still being done at [facility] even though he had testing at [place] [address]. No other issues ntoed.

## 2020-07-31 DIAGNOSIS — I10 ESSENTIAL HYPERTENSION: ICD-10-CM

## 2020-07-31 RX ORDER — LOSARTAN POTASSIUM 100 MG/1
TABLET ORAL
Qty: 90 TAB | Refills: 3 | Status: SHIPPED | OUTPATIENT
Start: 2020-07-31 | End: 2021-10-27

## 2020-08-03 NOTE — TELEPHONE ENCOUNTER
PCP: Eric Millan MD    Last appt: 6/18/2020  Future Appointments   Date Time Provider Tameka De Paz   9/22/2020 11:45 AM Jannet Robin MD [address] [place] [address]       Requested Prescriptions     Pending Prescriptions Disp Refills    metoprolol succinate (TOPROL-XL) 25 mg XL tablet 60 Tab 5     Sig: Take 1 Tab by mouth two (2) times a day. Other Comments:  Dosage increased at [address] 6/18/2020.

## 2020-08-04 RX ORDER — METOPROLOL SUCCINATE 25 MG/1
25 TABLET, EXTENDED RELEASE ORAL 2 TIMES DAILY
Qty: 60 TAB | Refills: 5 | Status: SHIPPED | OUTPATIENT
Start: 2020-08-04 | End: 2021-04-09

## 2020-08-14 DIAGNOSIS — E55.9 VITAMIN D DEFICIENCY: ICD-10-CM

## 2020-08-14 RX ORDER — ERGOCALCIFEROL 1.25 MG/1
CAPSULE ORAL
Qty: 12 CAP | Refills: 3 | Status: SHIPPED | OUTPATIENT
Start: 2020-08-14 | End: 2021-07-21 | Stop reason: SDUPTHER

## 2020-10-01 ENCOUNTER — OFFICE VISIT (OUTPATIENT)
Dept: CARDIOLOGY CLINIC | Age: 60
End: 2020-10-01
Payer: COMMERCIAL

## 2020-10-01 VITALS
OXYGEN SATURATION: 95 % | BODY MASS INDEX: 36.58 KG/M2 | DIASTOLIC BLOOD PRESSURE: 65 MMHG | SYSTOLIC BLOOD PRESSURE: 128 MMHG | TEMPERATURE: 98.9 F | WEIGHT: 276 LBS | HEART RATE: 74 BPM | HEIGHT: 73 IN

## 2020-10-01 DIAGNOSIS — R06.09 DYSPNEA ON EXERTION: ICD-10-CM

## 2020-10-01 DIAGNOSIS — I20.0 UNSTABLE ANGINA PECTORIS (HCC): Primary | ICD-10-CM

## 2020-10-01 PROCEDURE — 99214 OFFICE O/P EST MOD 30 MIN: CPT | Performed by: INTERNAL MEDICINE

## 2020-10-01 PROCEDURE — 93000 ELECTROCARDIOGRAM COMPLETE: CPT | Performed by: INTERNAL MEDICINE

## 2020-10-01 RX ORDER — NITROGLYCERIN 0.4 MG/1
0.4 TABLET SUBLINGUAL
Qty: 25 TAB | Refills: 3 | Status: SHIPPED | OUTPATIENT
Start: 2020-10-01

## 2020-10-01 NOTE — LETTER
10/1/20 Patient: Thea Dudley YOB: 1960 Date of Visit: 10/1/2020 Annalise Singer MD 
[address] [place] [address] VIA In Basket Dear Annalise Singer MD, Thank you for referring Mr. Jackelyn Patel to [facility] AT [facility] for evaluation. My notes for this consultation are attached. If you have questions, please do not hesitate to call me. I look forward to following your patient along with you. Sincerely, Teto Jacobs MD

## 2020-10-01 NOTE — PROGRESS NOTES
Mr. Ignacio Bajwa is a pleasant 56 y.o. male with history of coronary artery disease, status post LAD and OM branch stent in April, 2016, aortic aneurysm, mitral regurgitation, dyslipidemia and hypertension. Patient is here today for follow-up appointment. He has his cardiac testing done since last time. He denies any new symptoms since last time. He has occasional random episode of dyspnea and sometimes chest pressure and usually this happens in the morning. It has not changed. He does not have to take any nitroglycerin. He denies any nausea vomiting or diaphoresis. There is no radiation. Overall he has no new symptoms. He is stable and denying any complaints    Past Medical History:   Diagnosis Date    Aortic aneurysm     Coronary artery disease     LAD - 4.0 x 18mm XIENCE 4/16, OM1 - 4.0 x 15mm XIENCE 4/16     Dyslipidemia     Granuloma annulare     Hypertension     Mitral regurgitation     moderate (TTE 02/17),  moderate (ANITA 4/16)    Pulmonary fibrosis     upper lobes bilat (CTA 12/16)    Remote tobacco use     quit 2012    Seborrheic dermatitis         Past Surgical History:   Procedure Laterality Date    HX CERVICAL FUSION      cervical fusion    HX ORTHOPAEDIC      prior (L) knee surgery       Current Outpatient Medications   Medication Sig    Vitamin D2 1,250 mcg (50,000 unit) capsule take 1 capsule by mouth every week    metoprolol succinate (TOPROL-XL) 25 mg XL tablet Take 1 Tab by mouth two (2) times a day.  losartan (COZAAR) 100 mg tablet take 1 tablet by mouth once daily    ezetimibe (ZETIA) 10 mg tablet take 1 tablet by mouth once daily    atorvastatin (LIPITOR) 80 mg tablet take 1 tablet by mouth once daily    albuterol (PROVENTIL HFA, VENTOLIN HFA, PROAIR HFA) 90 mcg/actuation inhaler Take 1 Puff by inhalation every four (4) hours as needed for Wheezing.  nitroglycerin (NITROSTAT) 0.4 mg SL tablet 1 Tab by SubLINGual route every five (5) minutes as needed for Chest Pain.  ASPIRIN (ASPIR-81 PO) Take 81 mg by mouth two (2) times a day.  omega-3 fatty acids-vitamin e (FISH OIL) 1,000 mg cap Take 4,000 mg by mouth daily. No current facility-administered medications for this visit. Allergies and Intolerances: Allergies   Allergen Reactions    Brilinta [Ticagrelor] Other (comments)     dyspnea    Sildenafil Other (comments)     Flushing    Vardenafil Other (comments)     Flushing          Family History:    Heart Disease Father        Social History:   He  reports that he quit smoking about 7 years ago. His smoking use included cigarettes. He has a 40.00 pack-year smoking history. He has never used smokeless tobacco.  He  reports current alcohol use. Physical:   Vitals:   BP: 128/65  HR: 74  Wt: 276 lb (125.2 kg)    Exam:   General:          Middle aged  gentleman, no complaints, no distress.     Head/Neck:     No JVD                           No bruits. Lungs:             MD respiratory distress.                             Clear with good effort. Heart:              Regular.  II/VI systolic murmur.   Abdomen:       Soft. Bowel sounds present  Extremities:     Intact pulses.                             YO edema.     Neurological:   Alert and oriented to person, place, time.                             No gross neurological deficit. Skin:  Warm and dry.     Review of Data:    LABS:   Lab Results   Component Value Date/Time    WBC 6.2 06/04/2020 09:51 AM    HGB 14.9 06/04/2020 09:51 AM    HCT 47.0 06/04/2020 09:51 AM    PLATELET 809 12/20/0264 09:51 AM    MCV 99 (H) 06/04/2020 09:51 AM     Lab Results   Component Value Date/Time    Sodium 137 06/04/2020 09:51 AM    Potassium 4.3 06/04/2020 09:51 AM    Chloride 99 06/04/2020 09:51 AM    CO2 25 06/04/2020 09:51 AM    Anion gap 13.0 06/04/2020 09:51 AM    Glucose 113 (H) 06/04/2020 09:51 AM    BUN 17 06/04/2020 09:51 AM    Creatinine 0.6 (L) 06/04/2020 09:51 AM    BUN/Creatinine ratio 19 04/30/2016 04:20 AM    GFR est AA >60 04/30/2016 04:20 AM    GFR est non-AA >60 04/30/2016 04:20 AM    Calcium 9.5 06/04/2020 09:51 AM    Bilirubin, total 0.6 06/04/2020 09:51 AM    Alk. phosphatase 81 06/04/2020 09:51 AM    Protein, total 7.3 06/04/2020 09:51 AM    Albumin 4.5 06/04/2020 09:51 AM    Globulin 2.8 06/04/2020 09:51 AM    A-G Ratio 1.6 06/04/2020 09:51 AM    ALT (SGPT) 32 06/04/2020 09:51 AM     Lab Results   Component Value Date/Time    Cholesterol, total 128 06/04/2020 09:51 AM    HDL Cholesterol 40 06/04/2020 09:51 AM    LDL, calculated 67 06/04/2020 09:51 AM    VLDL, calculated 21 06/04/2020 09:51 AM    Triglyceride 104 06/04/2020 09:51 AM     Lab Results   Component Value Date/Time    Hemoglobin A1c 5.8 (H) 06/04/2020 09:51 AM    Hemoglobin A1c (POC) 5.5 09/15/2017 01:18 PM     EKG:  April 2016:  Sinus rhythm, 54 beats per minute. NSTT. TRANSESOPHAGEAL ECHOCARDIOGRAM: April 2016:   LEFT VENTRICLE: Size was normal. Systolic function was normal.  RIGHT VENTRICLE: The size was normal. Systolic function was normal.  LEFT ATRIUM: Size was normal. No thrombus was identified. There was no  spontaneous echo contrast (\"smoke\"). APPENDAGE: The size was normal. No  thrombus was identified. There was no spontaneous echo contrast (\"smoke\")  in the appendage. DOPPLER: The function was normal (normal emptying  velocity). ATRIAL SEPTUM: There was no evidence of intracardiac shunt by  peripherally-administered agitated saline contrast.  RIGHT ATRIUM: Size was normal.  MITRAL VALVE: There was minimal thickening. There was minimal  calcification. There appeared to be a partial flail of the P2 segment of  the posterior leaflet of the mitral valve. Torn chordae were not  visualized. The papillary muscle appeared to be intact. There was moderate  eccentric mitral regurgitation along the atrial aspect of the anterior  leaflet and along the atrial septum.  There was no evidence for pulmonary  vein inflow reversal.    ECHO (08/20)  Left Ventricle  Normal diastolic function. Mildly dilated left ventricle. Asymmetric hypertrophy. Mild septal wall hypertrophy. Mild systolic dysfunction. The estimated ejection fraction is 45 - 50%. Visually measured ejection fraction. LV wall motion is noted to be no regional wall motion abnormality. Wall Scoring  The left ventricular wall motion is normal.             Left Atrium  Normal cavity size. Left Atrium volume index is 32.3 mL/m2. Right Ventricle  Normal cavity size and global systolic function. Right Atrium  Normal cavity size. Aortic Valve  Trileaflet valve structure, no stenosis and no regurgitation. Mitral Valve  No stenosis. Mitral valve thickening. There is mild, posterior leaflet thickening at the tip. Mitral valve prolapse. Moderate regurgitation. The mitral regurgitant jet is eccentric. Tricuspid Valve  Normal valve structure and no stenosis. Tricuspid regurgitation is inadequate for estimation of right ventricular systolic pressure. Pulmonic Valve  Normal valve structure, no stenosis and no regurgitation. Aorta  Normal aortic root. Mildly dilated ascending aorta; diameter is 3.7 cm. CT CHEST AORTOGRAM (2018)  CT  AORTOGRAM: Tricuspid aortic valve with mild atherosclerotic calcification. Normal caliber aortic root and isthmus. Aneurysmal ascending thoracic aorta  measures 4.1 cm. Normal caliber transverse thoracic aorta (3.1 cm) and  descending thoracic aorta (3.0 cm). Relatively minimal atherosclerotic disease  within transverse and descending thoracic aorta. More mild atherosclerotic  plaque is seen within the normal caliber upper thoracic aorta. No high-grade  stenosis within celiac or SMA axis origins. No aortic dissection, intramural  hematoma or other acute aortic pathology.     CATHETERIZATION: March and April 2016: (images and report personally reviewed)  LM - normal  LAD - 80-90% ostial/prox (4.0 x 18mm XIENCE 4/16)  D1 - 70%  Cx - 50-60% prox  OM1 - 70-80% mid (4.0 x 15mm XIENCE 4/16)  RCA - plaquing  RPDA - normal  RPLV - normal  EF 50%    MR 2+    STRESS TEST (07/20)  · Baseline ECG: Sinus rhythm. · Gated SPECT: Left ventricular function post-stress was normal. Calculated ejection fraction is 54%. · Myocardial perfusion imaging supports a low risk stress test.  · There was no convincing evidence of significant reversible defect to suggest on going major ischemia. Inferior defect is most consistent with diaphragmatic attenuation artifact    IMPRESSION AND PLAN:    Coronary artery disease:   Mr. Chantell Seo had an LAD and OM branch stent in April of 2016. Stress test in 01/17 without on going ischemia. He does have documented 70% stenosis of first diagonal branch. Stress test in July 2020, low risk. His symptoms appear stable. Denies any use of nitroglycerin. Continue aspirin, statin and beta-blocker  Aortic aneurysm:    Mr. Chantell Seo had a cardiac CTA from December, 2015, which suggested ascending aortic aneurysm. The measurements are noted above. Repeat CTA in 2018 showed stable ascending aneurysm at 4.1 cm. [place] CTA chest in 2020 showed stable aneurysm at 4.2 cm  Given his body habitus I do not believe that dimensions are not markedly abnormal.  We will repeat CT with contrast in 2 years unless needed sooner    Mitral regurgitation:  Mr. Chantell Seo had an echocardiogram, as well as ANITA in 2016, which showed moderate mitral regurgitation, likely from prolapse of the posterior leaflet. Last ECHO with mild - moderate MR in 05/18  Moderate MR by echo in July 2020  Currently he does not have any fluid overload on exam.  He is on Cozaar and Toprol. Increase Toprol as mentioned above    HTN:   BP today is normal.  Continue same medication    Obesity: Weight today is 274 lbs. Thinks he has gained weight. Working on diet and exercise to lose weight. Folllow up in 9 months.

## 2020-10-01 NOTE — PROGRESS NOTES
Venkatesh Wooten presents today for   Chief Complaint   Patient presents with   [place] preferred language for health care discussion is english/other. Is someone accompanying this pt? no    Is the patient using any DME equipment during [address]? Hearing aid    Depression Screening:  3 most recent PHQ Screens 10/1/2020   Little interest or pleasure in doing things Not at all   Feeling down, depressed, irritable, or hopeless Not at all   Total Score PHQ 2 0       Learning Assessment:  Learning Assessment 4/20/2016   PRIMARY LEARNER Patient   HIGHEST LEVEL OF EDUCATION - PRIMARY LEARNER  -   BARRIERS PRIMARY LEARNER -   CO-LEARNER CAREGIVER -   PRIMARY LANGUAGE ENGLISH   LEARNER PREFERENCE PRIMARY READING   ANSWERED BY pt   RELATIONSHIP SELF       Abuse Screening:  Completed    Fall Risk  Completed    Pt currently taking Anticoagulant therapy? no    Coordination of Care  1. Have you been to the ER, urgent care clinic since your last visit? Hospitalized since your last visit? no    2. Have you seen or consulted any other health care providers outside of the [address] [place] [address] since your last visit? Include any pap smears or colon screening.  no

## 2020-11-23 DIAGNOSIS — I25.10 CORONARY ARTERY DISEASE INVOLVING NATIVE CORONARY ARTERY OF NATIVE HEART WITHOUT ANGINA PECTORIS: ICD-10-CM

## 2020-11-23 DIAGNOSIS — E78.2 MIXED HYPERLIPIDEMIA: ICD-10-CM

## 2020-11-23 DIAGNOSIS — I10 ESSENTIAL HYPERTENSION: ICD-10-CM

## 2020-11-23 DIAGNOSIS — R73.03 PREDIABETES: ICD-10-CM

## 2021-01-14 ENCOUNTER — APPOINTMENT (OUTPATIENT)
Dept: FAMILY MEDICINE CLINIC | Age: 61
End: 2021-01-14

## 2021-01-19 ENCOUNTER — OFFICE VISIT (OUTPATIENT)
Dept: FAMILY MEDICINE CLINIC | Age: 61
End: 2021-01-19
Payer: COMMERCIAL

## 2021-01-19 VITALS
HEART RATE: 86 BPM | BODY MASS INDEX: 36.31 KG/M2 | WEIGHT: 274 LBS | HEIGHT: 73 IN | SYSTOLIC BLOOD PRESSURE: 138 MMHG | OXYGEN SATURATION: 97 % | TEMPERATURE: 98.1 F | DIASTOLIC BLOOD PRESSURE: 80 MMHG | RESPIRATION RATE: 15 BRPM

## 2021-01-19 DIAGNOSIS — E55.9 VITAMIN D DEFICIENCY: ICD-10-CM

## 2021-01-19 DIAGNOSIS — I71.20 THORACIC AORTIC ANEURYSM WITHOUT RUPTURE: ICD-10-CM

## 2021-01-19 DIAGNOSIS — Z12.11 COLON CANCER SCREENING: ICD-10-CM

## 2021-01-19 DIAGNOSIS — R73.03 PREDIABETES: ICD-10-CM

## 2021-01-19 DIAGNOSIS — J44.9 CHRONIC OBSTRUCTIVE PULMONARY DISEASE, UNSPECIFIED COPD TYPE (HCC): ICD-10-CM

## 2021-01-19 DIAGNOSIS — R06.09 DOE (DYSPNEA ON EXERTION): ICD-10-CM

## 2021-01-19 DIAGNOSIS — I10 ESSENTIAL HYPERTENSION: ICD-10-CM

## 2021-01-19 DIAGNOSIS — Z00.00 ROUTINE GENERAL MEDICAL EXAMINATION AT A HEALTH CARE FACILITY: Primary | ICD-10-CM

## 2021-01-19 DIAGNOSIS — I25.10 CORONARY ARTERY DISEASE INVOLVING NATIVE CORONARY ARTERY OF NATIVE HEART WITHOUT ANGINA PECTORIS: ICD-10-CM

## 2021-01-19 PROCEDURE — 99396 PREV VISIT EST AGE 40-64: CPT | Performed by: FAMILY MEDICINE

## 2021-01-19 RX ORDER — ALBUTEROL SULFATE 90 UG/1
1 AEROSOL, METERED RESPIRATORY (INHALATION)
Qty: 1 INHALER | Refills: 12 | Status: SHIPPED | OUTPATIENT
Start: 2021-01-19

## 2021-01-19 NOTE — PROGRESS NOTES
HISTORY OF PRESENT ILLNESS  Michela Kennedy is a 56 y.o. male. He presents for health assessment, preventative care and follow-up with a history of hypertension, hyperlipidemia, coronary artery disease and thoracic aortic aneurysm followed by cardiology, COPD, prediabetes and vitamin D deficiency    Most recent cardiology follow-up was in October at which time it was determined that repeat imaging with regard to the thoracic aortic aneurysm could be deferred for 2 years.     Mr#: [id number]      Past Medical History:   Diagnosis Date    Aortic aneurysm     Coronary artery disease     LAD - 4.0 x 18mm XIENCE , OM1 - 4.0 x 15mm XIENCE      Dyslipidemia     Granuloma annulare     Hypertension     Mitral regurgitation     moderate (TTE ),  moderate (ANITA )    Pulmonary fibrosis     upper lobes bilat (CTA )    Remote tobacco use     quit     Seborrheic dermatitis        Past Surgical History:   Procedure Laterality Date    HX CERVICAL FUSION      cervical fusion    HX ORTHOPAEDIC      prior (L) knee surgery       Family History   Problem Relation Age of Onset    Post-op Nausea/Vomiting Mother     Cancer Mother         lung cancer     Heart Disease Father     Headache Father     Hypertension Maternal Grandmother     Heart Disease Maternal Grandmother     Cancer Paternal Grandmother         throat cancer     Diabetes Neg Hx     Stroke Neg Hx        Allergies   Allergen Reactions    Brilinta [Ticagrelor] Other (comments)     dyspnea    Sildenafil Other (comments)     Flushing    Vardenafil Other (comments)     Flushing       Social History     Tobacco Use   Smoking Status Former Smoker    Packs/day: 1.00    Years: 40.00    Pack years: 40.00    Types: Cigarettes    Quit date: 8/10/2013    Years since quittin.4   Smokeless Tobacco Never Used   Tobacco Comment    30 years smoking, stopped Oct. 2012       Social History     Substance and Sexual Activity   Alcohol Use Yes  Alcohol/week: 0.0 standard drinks    Comment: beer 5-18 on weekends           Patient Active Problem List   Diagnosis Code    Essential hypertension I10    Mixed hyperlipidemia E78.2    Mitral regurgitation I34.0    Aortic aneurysm I71.9    Pulmonary fibrosis J84.10    Vitamin D deficiency E55.9    Bilateral carpal tunnel syndrome G56.03    Ex-smoker Z87.891    Granulomatous lung disease ([facility] J84.10    Coronary artery disease involving native coronary artery of native heart without angina pectoris I25.10    Obesity (BMI 30-39. 9) E66.9    Prediabetes R73.03    Severe obesity (BMI 35.0-39. 9) E66.01         Current Outpatient Medications:     nitroglycerin (NITROSTAT) 0.4 mg SL tablet, 1 Tab by SubLINGual route every five (5) minutes as needed for Chest Pain., Disp: 25 Tab, Rfl: 3    Vitamin D2 1,250 mcg (50,000 unit) capsule, take 1 capsule by mouth every week, Disp: 12 Cap, Rfl: 3    metoprolol succinate (TOPROL-XL) 25 mg XL tablet, Take 1 Tab by mouth two (2) times a day., Disp: 60 Tab, Rfl: 5    losartan (COZAAR) 100 mg tablet, take 1 tablet by mouth once daily, Disp: 90 Tab, Rfl: 3    ezetimibe (ZETIA) 10 mg tablet, take 1 tablet by mouth once daily, Disp: 90 Tab, Rfl: 4    atorvastatin (LIPITOR) 80 mg tablet, take 1 tablet by mouth once daily, Disp: 90 Tab, Rfl: 4    albuterol (PROVENTIL HFA, VENTOLIN HFA, PROAIR HFA) 90 mcg/actuation inhaler, Take 1 Puff by inhalation every four (4) hours as needed for Wheezing., Disp: 1 Inhaler, Rfl: 5    ASPIRIN (ASPIR-81 PO), Take 81 mg by mouth two (2) times a day., Disp: , Rfl:     omega-3 fatty acids-vitamin e (FISH OIL) 1,000 mg cap, Take 4,000 mg by mouth daily. , Disp: , Rfl:              Review of Systems   Constitutional: Negative for fever, malaise/fatigue and weight loss. HENT: Negative for congestion, ear pain, hearing loss and sore throat. Eyes: Negative for blurred vision.    Respiratory: Positive for shortness of breath (slowly progressing). Negative for cough and wheezing. Cardiovascular: Positive for chest pain (no change, cardiologist aware). Negative for palpitations, orthopnea and leg swelling. Gastrointestinal: Negative for abdominal pain, blood in stool, constipation, diarrhea, heartburn, melena, nausea and vomiting. Genitourinary: Negative for dysuria, frequency, hematuria and urgency. Nocturia 2-3x/night   Musculoskeletal: Positive for joint pain (right thumb pain since fall in November, bilateral knee pain, getting worse). Skin: Positive for rash ( arms and hands, spreading, will F/U with derm). Skin tag left eyelid   Neurological: Negative for dizziness, tingling, sensory change, focal weakness and headaches. Endo/Heme/Allergies: Positive for environmental allergies. Psychiatric/Behavioral: Negative for depression and suicidal ideas. The patient is not nervous/anxious. Visit Vitals  /80 (BP 1 Location: Left arm, BP Patient Position: Sitting)   Pulse 86   Temp 98.1 °F (36.7 °C) (Temporal)   Resp 15   Ht 6' 1\" (1.854 m)   Wt 274 lb (124.3 kg)   SpO2 97%   BMI 36.15 kg/m²       Physical Exam  Vitals signs and nursing note reviewed. Constitutional:       General: He is not in acute distress. Appearance: Normal appearance. He is obese. He is not ill-appearing. HENT:      Head: Normocephalic. Right Ear: Tympanic membrane, ear canal and external ear normal.      Left Ear: Tympanic membrane, ear canal and external ear normal.   Eyes:      Extraocular Movements: Extraocular movements intact. Conjunctiva/sclera: Conjunctivae normal.      Pupils: Pupils are equal, round, and reactive to light. Neck:      Musculoskeletal: Neck supple. Vascular: No carotid bruit. Cardiovascular:      Rate and Rhythm: Normal rate and regular rhythm. Heart sounds: Normal heart sounds. Pulmonary:      Effort: Pulmonary effort is normal.      Breath sounds: Normal breath sounds.    Abdominal: Palpations: Abdomen is soft. Tenderness: There is no abdominal tenderness. Musculoskeletal:         General: No deformity. Right lower leg: No edema. Left lower leg: No edema. Skin:     General: Skin is warm and dry. Neurological:      General: No focal deficit present. Mental Status: He is alert and oriented to person, place, and time. Psychiatric:         Mood and Affect: Mood normal.         Behavior: Behavior normal.       Results for Kimberly Milan (MRN [id number]) as of 1/19/2021 06:42   Ref. Range 1/14/2021 07:50   Sodium Latest Ref Range: 133 - 145 mmol/L 140   Potassium Latest Ref Range: 3.5 - 5.5 mmol/L 4.3   Chloride Latest Ref Range: 98 - 110 mmol/L 101   CO2 Latest Ref Range: 20 - 32 mmol/L 28   Anion gap Latest Ref Range: 3.0 - 15.0 mmol/L 11.0   Glucose Latest Ref Range: 70 - 99 mg/dL 139 (H)   BUN Latest Ref Range: 6 - 22 mg/dL 20   Creatinine Latest Ref Range: 0.8 - 1.6 mg/dL 0.8   Calcium Latest Ref Range: 8.4 - 10.5 mg/dL 9.5   Triglyceride Latest Ref Range: 40 - 149 mg/dL 111   Cholesterol, total Latest Ref Range: 110 - 200 mg/dL 141   HDL Cholesterol Latest Ref Range: >=40 mg/dL 41   CHOLESTEROL/HDL Latest Ref Range: 0.0 - 5.0  3.4   Non-HDL Cholesterol Latest Ref Range: <130 mg/dL 100   VLDL, calculated Latest Ref Range: 8 - 30 mg/dL 22   LDL, calculated Latest Ref Range: 50 - 99 mg/dL 78   LDL/HDL Ratio Unknown 1.9   Hemoglobin A1c, (calculated) Latest Ref Range: 4.8 - 5.6 % 5.8 (H)     ASSESSMENT and PLAN    ICD-10-CM ICD-9-CM    1. Routine general medical examination at a health care facility  Z00.00 V70.0    2. Essential hypertension  I10 401.9    3. Coronary artery disease involving native coronary artery of native heart without angina pectoris  I25.10 414.01    4. Thoracic aortic aneurysm without rupture (HCC)  I71.2 441.2    5.  Chronic obstructive pulmonary disease, unspecified COPD type (HCC)  J44.9 496 albuterol (PROVENTIL HFA, VENTOLIN HFA, PROAIR HFA) 90 mcg/actuation inhaler   6. Prediabetes  R73.03 790.29    7. Vitamin D deficiency  E55.9 268.9    8. SMITH (dyspnea on exertion)  R06.00 786.09 albuterol (PROVENTIL HFA, VENTOLIN HFA, PROAIR HFA) 90 mcg/actuation inhaler   9. Colon cancer screening  Z12.11 V76.51 COLOGUARD TEST (FECAL DNA COLORECTAL CANCER SCREENING)   Health maintenance:    Colorectal cancer screening-Cologuard  Influenza imm - late Oct  Tdap-deferred    Continue current medications  Avoid dietary salt, starch and sugar and is much as possible follow program of regular aerobic exercise  Return for lab appointment followed by office appointment in 6 months, sooner with any problems    Morris Yi MD      PLEASE NOTE:   This document has been produced using voice recognition software. Unrecognized errors in transcription may be present.

## 2021-01-19 NOTE — PATIENT INSTRUCTIONS
Continue current medications Avoid dietary salt, starch and sugar and is much as possible follow program of regular aerobic exercise Return for lab appointment followed by office appointment in 6 months, sooner with any problems

## 2021-01-19 NOTE — PROGRESS NOTES
Franco Goodman is a 56 y.o. male (: 1960) presenting to address:    Chief Complaint   Patient presents with    Physical       Vitals:    21 0847   BP: 138/80   Pulse: 86   Resp: 15   Temp: 98.1 °F (36.7 °C)   TempSrc: Temporal   SpO2: 97%   Weight: 274 lb (124.3 kg)   Height: 6' 1\" (1.854 m)   PainSc:   0 - No pain       Hearing/Vision:   No exam data present    Learning Assessment:     Learning Assessment 2016   PRIMARY LEARNER Patient   HIGHEST LEVEL OF EDUCATION - PRIMARY LEARNER  -   BARRIERS PRIMARY LEARNER -   CO-LEARNER CAREGIVER -   PRIMARY LANGUAGE ENGLISH   LEARNER PREFERENCE PRIMARY READING   ANSWERED BY pt   RELATIONSHIP SELF     Depression Screening:     3 most recent PHQ Screens 2021   Little interest or pleasure in doing things Not at all   Feeling down, depressed, irritable, or hopeless Not at all   Total Score PHQ 2 0     Fall Risk Assessment:     Fall Risk Assessment, last 12 mths 10/1/2020   Able to walk? Yes   Fall in past 12 months? No     Abuse Screening:     Abuse Screening Questionnaire 10/1/2020   Do you ever feel afraid of your partner? N   Are you in a relationship with someone who physically or mentally threatens you? N   Is it safe for you to go home? Y     Coordination of Care Questionaire:   1. Have you been to the ER, urgent care clinic since your last visit? Hospitalized since your last visit? NO    2. Have you seen or consulted any other health care providers outside of the [address] [place] [address] since your last visit? Include any pap smears or colon screening. YES, cardiology    Advanced Directive:   1. Do you have an Advanced Directive? YES    2. Would you like information on Advanced Directives?  NO

## 2021-02-09 ENCOUNTER — TELEPHONE (OUTPATIENT)
Dept: FAMILY MEDICINE CLINIC | Age: 61
End: 2021-02-09

## 2021-02-09 NOTE — TELEPHONE ENCOUNTER
Please advise Mr. Maddena Carlos that his Cologuard test is negative and should be repeated in 3 years.

## 2021-04-02 DIAGNOSIS — E78.2 MIXED HYPERLIPIDEMIA: ICD-10-CM

## 2021-04-02 RX ORDER — ATORVASTATIN CALCIUM 80 MG/1
TABLET, FILM COATED ORAL
Qty: 90 TAB | Refills: 4 | Status: SHIPPED | OUTPATIENT
Start: 2021-04-02 | End: 2022-04-12

## 2021-04-09 RX ORDER — METOPROLOL SUCCINATE 25 MG/1
TABLET, EXTENDED RELEASE ORAL
Qty: 60 TAB | Refills: 5 | Status: SHIPPED | OUTPATIENT
Start: 2021-04-09 | End: 2022-02-10

## 2021-07-19 ENCOUNTER — APPOINTMENT (OUTPATIENT)
Dept: FAMILY MEDICINE CLINIC | Age: 61
End: 2021-07-19

## 2021-07-19 DIAGNOSIS — Z12.5 PROSTATE CANCER SCREENING: ICD-10-CM

## 2021-07-19 DIAGNOSIS — I10 ESSENTIAL HYPERTENSION: ICD-10-CM

## 2021-07-19 DIAGNOSIS — E55.9 VITAMIN D DEFICIENCY: ICD-10-CM

## 2021-07-19 DIAGNOSIS — R73.03 PREDIABETES: ICD-10-CM

## 2021-07-19 DIAGNOSIS — E78.2 MIXED HYPERLIPIDEMIA: ICD-10-CM

## 2021-07-19 DIAGNOSIS — I10 ESSENTIAL HYPERTENSION: Primary | ICD-10-CM

## 2021-07-19 LAB
25(OH)D3 SERPL-MCNC: 50.3 NG/ML (ref 32–100)
ANION GAP SERPL CALC-SCNC: 11 MMOL/L (ref 3–15)
AVG GLU, 10930: 126 MG/DL (ref 91–123)
BUN SERPL-MCNC: 17 MG/DL (ref 6–22)
CALCIUM SERPL-MCNC: 9.6 MG/DL (ref 8.4–10.5)
CHLORIDE SERPL-SCNC: 100 MMOL/L (ref 98–110)
CHOLEST SERPL-MCNC: 137 MG/DL (ref 110–200)
CO2 SERPL-SCNC: 26 MMOL/L (ref 20–32)
CREAT SERPL-MCNC: 0.7 MG/DL (ref 0.8–1.6)
GFRAA, 66117: >60
GFRNA, 66118: >60
GLUCOSE SERPL-MCNC: 115 MG/DL (ref 70–99)
HBA1C MFR BLD HPLC: 6 % (ref 4.8–5.6)
HDLC SERPL-MCNC: 3.2 MG/DL (ref 0–5)
HDLC SERPL-MCNC: 43 MG/DL
LDL/HDL RATIO,LDHD: 1.5
LDLC SERPL CALC-MCNC: 63 MG/DL (ref 50–99)
NON-HDL CHOLESTEROL, 011976: 94 MG/DL
POTASSIUM SERPL-SCNC: 4.4 MMOL/L (ref 3.5–5.5)
PSA SERPL-MCNC: 0.51 NG/ML
SODIUM SERPL-SCNC: 137 MMOL/L (ref 133–145)
TRIGL SERPL-MCNC: 155 MG/DL (ref 40–149)
VLDLC SERPL CALC-MCNC: 31 MG/DL (ref 8–30)

## 2021-07-21 ENCOUNTER — OFFICE VISIT (OUTPATIENT)
Dept: FAMILY MEDICINE CLINIC | Age: 61
End: 2021-07-21
Payer: COMMERCIAL

## 2021-07-21 VITALS
WEIGHT: 275.2 LBS | RESPIRATION RATE: 15 BRPM | DIASTOLIC BLOOD PRESSURE: 78 MMHG | HEIGHT: 73 IN | BODY MASS INDEX: 36.47 KG/M2 | HEART RATE: 73 BPM | SYSTOLIC BLOOD PRESSURE: 128 MMHG | OXYGEN SATURATION: 95 % | TEMPERATURE: 98.2 F

## 2021-07-21 DIAGNOSIS — Z23 ENCOUNTER FOR IMMUNIZATION: ICD-10-CM

## 2021-07-21 DIAGNOSIS — R73.03 PREDIABETES: ICD-10-CM

## 2021-07-21 DIAGNOSIS — I25.10 CORONARY ARTERY DISEASE INVOLVING NATIVE CORONARY ARTERY OF NATIVE HEART WITHOUT ANGINA PECTORIS: Primary | ICD-10-CM

## 2021-07-21 DIAGNOSIS — E55.9 VITAMIN D DEFICIENCY: ICD-10-CM

## 2021-07-21 DIAGNOSIS — E78.2 MIXED HYPERLIPIDEMIA: ICD-10-CM

## 2021-07-21 DIAGNOSIS — I71.20 THORACIC AORTIC ANEURYSM WITHOUT RUPTURE: ICD-10-CM

## 2021-07-21 DIAGNOSIS — J44.9 CHRONIC OBSTRUCTIVE PULMONARY DISEASE, UNSPECIFIED COPD TYPE (HCC): ICD-10-CM

## 2021-07-21 DIAGNOSIS — L91.8 SKIN TAGS, MULTIPLE ACQUIRED: ICD-10-CM

## 2021-07-21 DIAGNOSIS — E66.01 SEVERE OBESITY (BMI 35.0-39.9) WITH COMORBIDITY (HCC): ICD-10-CM

## 2021-07-21 DIAGNOSIS — I10 ESSENTIAL HYPERTENSION: ICD-10-CM

## 2021-07-21 PROCEDURE — 90471 IMMUNIZATION ADMIN: CPT | Performed by: FAMILY MEDICINE

## 2021-07-21 PROCEDURE — 90715 TDAP VACCINE 7 YRS/> IM: CPT | Performed by: FAMILY MEDICINE

## 2021-07-21 PROCEDURE — 99214 OFFICE O/P EST MOD 30 MIN: CPT | Performed by: FAMILY MEDICINE

## 2021-07-21 RX ORDER — ERGOCALCIFEROL 1.25 MG/1
CAPSULE ORAL
Qty: 12 CAPSULE | Refills: 3 | Status: SHIPPED | OUTPATIENT
Start: 2021-07-21 | End: 2022-09-29

## 2021-07-21 NOTE — PROGRESS NOTES
Tierra Rivera is a 56 y.o. male (: 1960) presenting to address:    Chief Complaint   Patient presents with    Hypertension     6 month f/u       Vitals:    21 0757   BP: 128/78   Pulse: 73   Resp: 15   Temp: 98.2 °F (36.8 °C)   TempSrc: Temporal   SpO2: 95%   Weight: 275 lb 3.2 oz (124.8 kg)   Height: 6' 1\" (1.854 m)   PainSc:   0 - No pain       Hearing/Vision:   No exam data present    Learning Assessment:     Learning Assessment 2021   PRIMARY LEARNER Patient   HIGHEST LEVEL OF EDUCATION - PRIMARY LEARNER  GRADUATED HIGH SCHOOL OR GED   BARRIERS PRIMARY LEARNER NONE   CO-LEARNER CAREGIVER No   PRIMARY LANGUAGE ENGLISH    NEED No   LEARNER PREFERENCE PRIMARY DEMONSTRATION   ANSWERED BY patient   RELATIONSHIP SELF     Depression Screening:     3 most recent PHQ Screens 2021   Little interest or pleasure in doing things Not at all   Feeling down, depressed, irritable, or hopeless Not at all   Total Score PHQ 2 0     Fall Risk Assessment:     Fall Risk Assessment, last 12 mths 2021   Able to walk? Yes   Fall in past 12 months? 1   Do you feel unsteady? 1   Are you worried about falling 0   Is the gait abnormal? 0   Number of falls in past 12 months 1   Fall with injury? 1     Abuse Screening:     Abuse Screening Questionnaire 2021   Do you ever feel afraid of your partner? N   Are you in a relationship with someone who physically or mentally threatens you? N   Is it safe for you to go home? Y     Coordination of Care Questionaire:   1. Have you been to the ER, urgent care clinic since your last visit? Hospitalized since your last visit? NO    2. Have you seen or consulted any other health care providers outside of the [address] [place] [address] since your last visit? Include any pap smears or colon screening. NO    Advanced Directive:   1. Do you have an Advanced Directive? YES    2. Would you like information on Advanced Directives?  NO

## 2021-07-21 NOTE — PATIENT INSTRUCTIONS
Continue current medications  Dermatology referral regarding skin tags  Avoid dietary salt starch and sugar and is much as possible follow program of regular aerobic exercise  Return for annual physical exam and follow-up with with an office A1c in January or sooner with any problems  Cardiology follow-up as planned, discussed benefits of low-dose chest CT for lung cancer screening in view of the fact that he will intermittently need chest CT with contrast with regard to his thoracic aortic aneurysm

## 2021-07-21 NOTE — PROGRESS NOTES
HISTORY OF PRESENT ILLNESS  Andre Nguyen is a 56 y.o. male.   He presents for follow-up with history of coronary artery disease and thoracic aortic aneurysm followed by cardiology, hypertension, hyperlipidemia, COPD, prediabetes, severe obesity and vitamin D deficiency    Mr#: [id number]      Past Medical History:   Diagnosis Date    Aortic aneurysm     Coronary artery disease     LAD - 4.0 x 18mm XIENCE , OM1 - 4.0 x 15mm XIENCE      Dyslipidemia     Granuloma annulare     Hypertension     Mitral regurgitation     moderate (TTE ),  moderate (ANITA )    Pulmonary fibrosis     upper lobes bilat (CTA )    Remote tobacco use     quit     Seborrheic dermatitis        Past Surgical History:   Procedure Laterality Date    HX CERVICAL FUSION      cervical fusion    HX ORTHOPAEDIC      prior (L) knee surgery       Family History   Problem Relation Age of Onset    Post-op Nausea/Vomiting Mother     Cancer Mother         lung cancer     Heart Disease Father     Headache Father     Hypertension Maternal Grandmother     Heart Disease Maternal Grandmother     Cancer Paternal Grandmother         throat cancer     Diabetes Neg Hx     Stroke Neg Hx        Allergies   Allergen Reactions    Brilinta [Ticagrelor] Other (comments)     dyspnea    Sildenafil Other (comments)     Flushing    Vardenafil Other (comments)     Flushing       Social History     Tobacco Use   Smoking Status Former Smoker    Packs/day: 1.00    Years: 40.00    Pack years: 40.00    Types: Cigarettes    Quit date: 8/10/2013    Years since quittin.9   Smokeless Tobacco Never Used   Tobacco Comment    30 years smoking, stopped Oct. 2012       Social History     Substance and Sexual Activity   Alcohol Use Yes    Alcohol/week: 0.0 standard drinks    Comment: beer 5-18 on weekends           Patient Active Problem List   Diagnosis Code    Essential hypertension I10    Mixed hyperlipidemia E78.2    Mitral regurgitation I34.0    Aortic aneurysm I71.9    Pulmonary fibrosis J84.10    Vitamin D deficiency E55.9    Bilateral carpal tunnel syndrome G56.03    Ex-smoker Z87.891    Granulomatous lung disease (HCC) J84.10    Coronary artery disease involving native coronary artery of native heart without angina pectoris I25.10    Obesity (BMI 30-39. 9) E66.9    Prediabetes R73.03    Severe obesity (BMI 35.0-39. 9) with comorbidity (HCC) E66.01         Current Outpatient Medications:     metoprolol succinate (TOPROL-XL) 25 mg XL tablet, take 1 tablet by mouth twice a day, Disp: 60 Tab, Rfl: 5    atorvastatin (LIPITOR) 80 mg tablet, take 1 tablet by mouth once daily, Disp: 90 Tab, Rfl: 4    albuterol (PROVENTIL HFA, VENTOLIN HFA, PROAIR HFA) 90 mcg/actuation inhaler, Take 1 Puff by inhalation every four (4) hours as needed for Wheezing., Disp: 1 Inhaler, Rfl: 12    nitroglycerin (NITROSTAT) 0.4 mg SL tablet, 1 Tab by SubLINGual route every five (5) minutes as needed for Chest Pain., Disp: 25 Tab, Rfl: 3    Vitamin D2 1,250 mcg (50,000 unit) capsule, take 1 capsule by mouth every week, Disp: 12 Cap, Rfl: 3    losartan (COZAAR) 100 mg tablet, take 1 tablet by mouth once daily, Disp: 90 Tab, Rfl: 3    ezetimibe (ZETIA) 10 mg tablet, take 1 tablet by mouth once daily, Disp: 90 Tab, Rfl: 4    ASPIRIN (ASPIR-81 PO), Take 81 mg by mouth two (2) times a day. Patient take 2 tabs daily, Disp: , Rfl:     omega-3 fatty acids-vitamin e (FISH OIL) 1,000 mg cap, Take 4,000 mg by mouth daily. , Disp: , Rfl:          Review of Systems   Constitutional: Negative for fever and weight loss. Respiratory: Positive for shortness of breath (somewhat increased). Negative for wheezing. Cardiovascular: Positive for chest pain (at baseline). Negative for palpitations, orthopnea and leg swelling. Gastrointestinal: Negative for abdominal pain.    Musculoskeletal:        Legs bother with lawn mowing, seem to tire faster   Skin: Multiple skin tags   Neurological: Negative for dizziness and headaches. Visit Vitals  /78 (BP 1 Location: Left upper arm, BP Patient Position: Sitting, BP Cuff Size: Adult long)   Pulse 73   Temp 98.2 °F (36.8 °C) (Temporal)   Resp 15   Ht 6' 1\" (1.854 m)   Wt 275 lb 3.2 oz (124.8 kg)   SpO2 95%   BMI 36.31 kg/m²       Physical Exam  Vitals and nursing note reviewed. Constitutional:       General: He is not in acute distress. Appearance: Normal appearance. He is well-developed. He is obese. He is not ill-appearing. HENT:      Head: Normocephalic. Eyes:      Extraocular Movements: Extraocular movements intact. Cardiovascular:      Rate and Rhythm: Normal rate and regular rhythm. Heart sounds: Normal heart sounds. Comments: Dorsal pedal and posterior tibial pulses intact bilaterally  Pulmonary:      Effort: Pulmonary effort is normal.      Breath sounds: Normal breath sounds. Musculoskeletal:      Cervical back: Neck supple. Skin:     General: Skin is warm and dry. Neurological:      Mental Status: He is alert and oriented to person, place, and time. Psychiatric:         Mood and Affect: Mood normal.         Behavior: Behavior normal.         Thought Content: Thought content normal.         Results for Trevin Castillo (MRN [id number]) as of 7/21/2021 05:55   Ref.  Range 1/14/2021 07:50 2/8/2021 15:56 7/19/2021 08:40   Sodium Latest Ref Range: 133 - 145 mmol/L 140  137   Potassium Latest Ref Range: 3.5 - 5.5 mmol/L 4.3  4.4   Chloride Latest Ref Range: 98 - 110 mmol/L 101  100   CO2 Latest Ref Range: 20 - 32 mmol/L 28  26   Anion gap Latest Ref Range: 3.0 - 15.0 mmol/L 11.0  11.0   Glucose Latest Ref Range: 70 - 99 mg/dL 139 (H)  115 (H)   BUN Latest Ref Range: 6 - 22 mg/dL 20  17   Creatinine Latest Ref Range: 0.8 - 1.6 mg/dL 0.8  0.7 (L)   Calcium Latest Ref Range: 8.4 - 10.5 mg/dL 9.5  9.6   Triglyceride Latest Ref Range: 40 - 149 mg/dL 111  155 (H)   Cholesterol, total Latest Ref Range: 110 - 200 mg/dL 141  137   HDL Cholesterol Latest Ref Range: >=40 mg/dL 41  43   CHOLESTEROL/HDL Latest Ref Range: 0.0 - 5.0  3.4  3.2   Non-HDL Cholesterol Latest Ref Range: <130 mg/dL 100  94   VLDL, calculated Latest Ref Range: 8 - 30 mg/dL 22  31 (H)   LDL, calculated Latest Ref Range: 50 - 99 mg/dL 78  63   LDL/HDL Ratio Unknown 1.9  1.5   Hemoglobin A1c, (calculated) Latest Ref Range: 4.8 - 5.6 % 5.8 (H)  6.0 (H)   Prostate Specific Ag Latest Ref Range: <=4.100 ng/mL   0.510   COLOGUARD TEST Unknown  Attch    VITAMIN D, 25-HYDROXY Latest Ref Range: 32.0 - 100.0 ng/mL   50.3       ASSESSMENT and PLAN    ICD-10-CM ICD-9-CM    1. Coronary artery disease involving native coronary artery of native heart without angina pectoris  I25.10 414.01    2. Essential hypertension  I10 401.9    3. Mixed hyperlipidemia  E78.2 272.2    4. Thoracic aortic aneurysm without rupture (HCC)  I71.2 441.2    5. Chronic obstructive pulmonary disease, unspecified COPD type ([facility]  J44.9 496    6. Prediabetes  R73.03 790.29    7. Vitamin D deficiency  E55.9 268.9 ergocalciferol (Vitamin D2) 1,250 mcg (50,000 unit) capsule   8. Severe obesity (BMI 35.0-39. 9) with comorbidity ([facility]  E66.01 278.01    9.  Skin tags, multiple acquired  L91.8 701.9 REFERRAL TO DERMATOLOGY   10. Encounter for immunization  Z23 V03.89 TETANUS, DIPHTHERIA TOXOIDS AND ACELLULAR PERTUSSIS VACCINE (TDAP), IN INDIVIDS. >=7, IM   Assessment:  No change in symptoms of cardiac ischemia  Hypertension adequately controlled  Satisfactory lipid levels  Prediabetes has progressed with hemoglobin A1c up from 5.8 to 6.0%  COPD symptoms stable  Vitamin D deficiency adequately treated    Health maintenance:  Status of Tdap immunization-given today  Possible candidate for low-dose chest CT for lung cancer screening    Plan:  Continue current medications  Avoid dietary salt starch and sugar and is much as possible follow program of regular aerobic exercise  Return for annual physical exam and follow-up with with an office A1c in January or sooner with any problems  Cardiology follow-up as planned, discussed benefits of low-dose chest CT for lung cancer screening in view of the fact that he will intermittently need chest CT with contrast with regard to his thoracic aortic aneurysm    Erika oCrbin MD      PLEASE NOTE:   This document has been produced using voice recognition software. Unrecognized errors in transcription may be present.

## 2021-07-21 NOTE — PROGRESS NOTES
Immunization Tdap administered 7/21/2021 by Matt Rodriguez Verified by Dilip Thomson LPN. Patient tolerated procedure well. No reactions noted.

## 2021-09-01 DIAGNOSIS — E78.5 DYSLIPIDEMIA: ICD-10-CM

## 2021-09-01 RX ORDER — EZETIMIBE 10 MG/1
TABLET ORAL
Qty: 90 TABLET | Refills: 4 | Status: SHIPPED | OUTPATIENT
Start: 2021-09-01 | End: 2022-09-01

## 2021-10-27 DIAGNOSIS — I10 ESSENTIAL HYPERTENSION: ICD-10-CM

## 2021-10-27 RX ORDER — LOSARTAN POTASSIUM 100 MG/1
TABLET ORAL
Qty: 90 TABLET | Refills: 3 | Status: SHIPPED | OUTPATIENT
Start: 2021-10-27 | End: 2022-10-30

## 2022-01-19 ENCOUNTER — TELEPHONE (OUTPATIENT)
Dept: FAMILY MEDICINE CLINIC | Age: 62
End: 2022-01-19

## 2022-01-19 NOTE — TELEPHONE ENCOUNTER
----- Message from Letha Fong sent at 1/19/2022 10:26 AM EST -----  Subject: Referral Request    QUESTIONS   Reason for referral request? cholestrol lab work for appointment on 2/7 ay   9:30am   Has the physician seen you for this condition before? Yes  Select a date? 2021-07-21  Select the Provider the patient wants to be referred to, if known (PCP or   Specialist)? Lauren Duarte   Preferred Specialist (if applicable)? Do you already have an appointment scheduled? Yes  Select Scheduled Date? 2022-02-07  Select Scheduled Physician? Lauren Duarte   Additional Information for Provider?   ---------------------------------------------------------------------------  --------------  [address]  What is the best way for the office to contact you? OK to leave message on   voicemail  Preferred Call Back Phone Number?  [phone]

## 2022-01-21 ENCOUNTER — TELEPHONE (OUTPATIENT)
Dept: FAMILY MEDICINE CLINIC | Age: 62
End: 2022-01-21

## 2022-01-21 DIAGNOSIS — R73.03 PREDIABETES: ICD-10-CM

## 2022-01-21 DIAGNOSIS — E78.2 MIXED HYPERLIPIDEMIA: ICD-10-CM

## 2022-01-21 DIAGNOSIS — I10 ESSENTIAL HYPERTENSION: ICD-10-CM

## 2022-01-21 DIAGNOSIS — E55.9 VITAMIN D DEFICIENCY: ICD-10-CM

## 2022-01-21 DIAGNOSIS — Z00.00 ROUTINE HEALTH MAINTENANCE: Primary | ICD-10-CM

## 2022-01-21 NOTE — TELEPHONE ENCOUNTER
Pt is scheduled for labs please place labs    Future Appointments   Date Time Provider Department Center   2/2/2022  9:00 AM [facility]   2/7/2022  9:30 AM Dagoberto Espitia MD [facility]

## 2022-01-21 NOTE — TELEPHONE ENCOUNTER
Pt is scheduled for labs please place labs    Future Appointments   Date Time Provider Tameka Zandra   2/2/2022  9:00 AM LAB_[facility] AMB   2/7/2022  9:30 AM Rupali Jones MD [facility] AMB

## 2022-02-02 ENCOUNTER — APPOINTMENT (OUTPATIENT)
Dept: FAMILY MEDICINE CLINIC | Age: 62
End: 2022-02-02

## 2022-02-02 DIAGNOSIS — I10 ESSENTIAL HYPERTENSION: ICD-10-CM

## 2022-02-02 DIAGNOSIS — R73.03 PREDIABETES: ICD-10-CM

## 2022-02-02 DIAGNOSIS — E55.9 VITAMIN D DEFICIENCY: ICD-10-CM

## 2022-02-02 DIAGNOSIS — Z00.00 ROUTINE HEALTH MAINTENANCE: ICD-10-CM

## 2022-02-02 DIAGNOSIS — E78.2 MIXED HYPERLIPIDEMIA: ICD-10-CM

## 2022-02-02 LAB
25(OH)D3 SERPL-MCNC: 47.5 NG/ML (ref 32–100)
A-G RATIO,AGRAT: 1.6 RATIO (ref 1.1–2.6)
ABSOLUTE LYMPHOCYTE COUNT, 10803: 2.7 K/UL (ref 1–4.8)
ALBUMIN SERPL-MCNC: 4.5 G/DL (ref 3.5–5)
ALP SERPL-CCNC: 84 U/L (ref 40–125)
ALT SERPL-CCNC: 33 U/L (ref 5–40)
ANION GAP SERPL CALC-SCNC: 13 MMOL/L (ref 3–15)
AST SERPL W P-5'-P-CCNC: 23 U/L (ref 10–37)
AVG GLU, 10930: 123 MG/DL (ref 91–123)
BASOPHILS # BLD: 0.1 K/UL (ref 0–0.2)
BASOPHILS NFR BLD: 1 % (ref 0–2)
BILIRUB SERPL-MCNC: 0.7 MG/DL (ref 0.2–1.2)
BILIRUB UR QL: NEGATIVE
BUN SERPL-MCNC: 17 MG/DL (ref 6–22)
CALCIUM SERPL-MCNC: 9.4 MG/DL (ref 8.4–10.5)
CHLORIDE SERPL-SCNC: 100 MMOL/L (ref 98–110)
CHOLEST SERPL-MCNC: 120 MG/DL (ref 110–200)
CLARITY: CLEAR
CO2 SERPL-SCNC: 25 MMOL/L (ref 20–32)
COLOR UR: YELLOW
CREAT SERPL-MCNC: 0.7 MG/DL (ref 0.8–1.6)
EOSINOPHIL # BLD: 0.1 K/UL (ref 0–0.5)
EOSINOPHIL NFR BLD: 2 % (ref 0–6)
ERYTHROCYTE [DISTWIDTH] IN BLOOD BY AUTOMATED COUNT: 12.1 % (ref 10–15.5)
GFRAA, 66117: >60
GFRNA, 66118: >60
GLOBULIN,GLOB: 2.9 G/DL (ref 2–4)
GLUCOSE SERPL-MCNC: 108 MG/DL (ref 70–99)
GLUCOSE UR QL: NEGATIVE MG/DL
GRANULOCYTES,GRANS: 45 % (ref 40–75)
HBA1C MFR BLD HPLC: 5.9 % (ref 4.8–5.6)
HCT VFR BLD AUTO: 43.4 % (ref 39.3–51.6)
HDLC SERPL-MCNC: 3.2 MG/DL (ref 0–5)
HDLC SERPL-MCNC: 37 MG/DL
HGB BLD-MCNC: 15.3 G/DL (ref 13.1–17.2)
HGB UR QL STRIP: ABNORMAL
KETONES UR QL STRIP.AUTO: NEGATIVE MG/DL
LDL/HDL RATIO,LDHD: 1.8
LDLC SERPL CALC-MCNC: 64 MG/DL (ref 50–99)
LEUKOCYTE ESTERASE: NEGATIVE
LYMPHOCYTES, LYMLT: 43 % (ref 20–45)
MCH RBC QN AUTO: 32 PG (ref 26–34)
MCHC RBC AUTO-ENTMCNC: 35 G/DL (ref 31–36)
MCV RBC AUTO: 92 FL (ref 80–95)
MONOCYTES # BLD: 0.5 K/UL (ref 0.1–1)
MONOCYTES NFR BLD: 9 % (ref 3–12)
NEUTROPHILS # BLD AUTO: 2.8 K/UL (ref 1.8–7.7)
NITRITE UR QL STRIP.AUTO: NEGATIVE
NON-HDL CHOLESTEROL, 011976: 84 MG/DL
PH UR STRIP: 5 PH (ref 5–8)
PLATELET # BLD AUTO: 155 K/UL (ref 140–440)
PMV BLD AUTO: 9.8 FL (ref 9–13)
POTASSIUM SERPL-SCNC: 4.4 MMOL/L (ref 3.5–5.5)
PROT SERPL-MCNC: 7.4 G/DL (ref 6.2–8.1)
PROT UR QL STRIP: NEGATIVE MG/DL
PSA SERPL-MCNC: 0.46 NG/ML
RBC # BLD AUTO: 4.73 M/UL (ref 3.8–5.8)
RBC #/AREA URNS HPF: ABNORMAL /HPF
SODIUM SERPL-SCNC: 138 MMOL/L (ref 133–145)
SP GR UR: 1.02 (ref 1–1.03)
TRIGL SERPL-MCNC: 96 MG/DL (ref 40–149)
TSH SERPL DL<=0.005 MIU/L-ACNC: 2 MCU/ML (ref 0.27–4.2)
URINE ASCORBIC ACID: NEGATIVE MG/DL
UROBILINOGEN UR STRIP-MCNC: <2 MG/DL
VLDLC SERPL CALC-MCNC: 19 MG/DL (ref 8–30)
WBC # BLD AUTO: 6.2 K/UL (ref 4–11)
WBC URNS QL MICRO: ABNORMAL /HPF (ref 0–2)

## 2022-02-06 NOTE — PROGRESS NOTES
HISTORY OF PRESENT ILLNESS  Lino Sargent is a 59 y.o. male. He presents for health assessment, preventative care and follow-up with a history of hypertension, hyperlipidemia, coronary artery disease and thoracic aortic aneurysm followed by cardiology, COPD previously followed by pulmonary medicine, prediabetes and vitamin D deficiency as well as severe obesity. Today he reports essentially no change in his symptoms except that he becomes short of breath more easily with exertion.     Mr#: [id number]      Past Medical History:   Diagnosis Date    Aortic aneurysm     Coronary artery disease     LAD - 4.0 x 18mm XIENCE , OM1 - 4.0 x 15mm XIENCE      Dyslipidemia     Granuloma annulare     Hypertension     Mitral regurgitation     moderate (TTE ),  moderate (ANITA )    Pulmonary fibrosis     upper lobes bilat (CTA )    Remote tobacco use     quit     Seborrheic dermatitis        Past Surgical History:   Procedure Laterality Date    HX CERVICAL FUSION      cervical fusion    HX ORTHOPAEDIC      prior (L) knee surgery       Family History   Problem Relation Age of Onset    Post-op Nausea/Vomiting Mother     Cancer Mother         lung cancer     Heart Disease Father     Headache Father     Hypertension Maternal Grandmother     Heart Disease Maternal Grandmother     Cancer Paternal Grandmother         throat cancer     Diabetes Neg Hx     Stroke Neg Hx        Allergies   Allergen Reactions    Brilinta [Ticagrelor] Other (comments)     dyspnea    Sildenafil Other (comments)     Flushing    Vardenafil Other (comments)     Flushing       Social History     Tobacco Use   Smoking Status Former Smoker    Packs/day: 1.00    Years: 40.00    Pack years: 40.00    Types: Cigarettes    Quit date: 8/10/2013    Years since quittin.5   Smokeless Tobacco Never Used   Tobacco Comment    30 years smoking, stopped Oct. 2012       Social History     Substance and Sexual Activity Alcohol Use Yes    Alcohol/week: 0.0 standard drinks    Comment: beer 5-18 on weekends           Patient Active Problem List   Diagnosis Code    Essential hypertension I10    Mixed hyperlipidemia E78.2    Mitral regurgitation I34.0    Aortic aneurysm I71.9    Pulmonary fibrosis J84.10    Vitamin D deficiency E55.9    Bilateral carpal tunnel syndrome G56.03    Ex-smoker Z87.891    Granulomatous lung disease ([facility] J84.10    Coronary artery disease involving native coronary artery of native heart without angina pectoris I25.10    Obesity (BMI 30-39. 9) E66.9    Prediabetes R73.03    Severe obesity (BMI 35.0-39. 9) with comorbidity (HCC) E66.01         Current Outpatient Medications:     losartan (COZAAR) 100 mg tablet, take 1 tablet by mouth once daily, Disp: 90 Tablet, Rfl: 3    ezetimibe (ZETIA) 10 mg tablet, take 1 tablet by mouth once daily, Disp: 90 Tablet, Rfl: 4    ergocalciferol (Vitamin D2) 1,250 mcg (50,000 unit) capsule, take 1 capsule by mouth every week, Disp: 12 Capsule, Rfl: 3    metoprolol succinate (TOPROL-XL) 25 mg XL tablet, take 1 tablet by mouth twice a day, Disp: 60 Tab, Rfl: 5    atorvastatin (LIPITOR) 80 mg tablet, take 1 tablet by mouth once daily, Disp: 90 Tab, Rfl: 4    albuterol (PROVENTIL HFA, VENTOLIN HFA, PROAIR HFA) 90 mcg/actuation inhaler, Take 1 Puff by inhalation every four (4) hours as needed for Wheezing., Disp: 1 Inhaler, Rfl: 12    nitroglycerin (NITROSTAT) 0.4 mg SL tablet, 1 Tab by SubLINGual route every five (5) minutes as needed for Chest Pain., Disp: 25 Tab, Rfl: 3    ASPIRIN (ASPIR-81 PO), Take 81 mg by mouth three (3) times daily. , Disp: , Rfl:     omega-3 fatty acids-vitamin e (FISH OIL) 1,000 mg cap, Take 4,000 mg by mouth daily. , Disp: , Rfl:            Review of Systems   Constitutional: Negative for chills, fever and weight loss. HENT: Negative for congestion, ear pain, hearing loss and sore throat.     Eyes: Negative for blurred vision and double vision. Respiratory: Positive for shortness of breath (with exertion). Negative for cough and wheezing. Cardiovascular: Positive for chest pain (intermittent chest pressure, no change). Negative for palpitations and leg swelling. Gastrointestinal: Negative for abdominal pain, blood in stool, constipation, diarrhea, heartburn, melena, nausea and vomiting. Genitourinary: Negative for dysuria and urgency. Musculoskeletal: Positive for joint pain (persistent carpal tunnel symptoms, worsening knee pain). Negative for myalgias. Skin: Negative for itching and rash. Neurological: Positive for sensory change. Negative for dizziness, tingling, focal weakness and headaches. Endo/Heme/Allergies: Positive for environmental allergies. Psychiatric/Behavioral: Negative for depression. The patient is not nervous/anxious and does not have insomnia. Visit Vitals  /70   Pulse 77   Temp 98.3 °F (36.8 °C) (Temporal)   Resp 16   Ht 6' 1\" (1.854 m)   Wt 270 lb (122.5 kg)   SpO2 98%   BMI 35.62 kg/m²       Physical Exam  Vitals and nursing note reviewed. Constitutional:       General: He is not in acute distress. Appearance: Normal appearance. He is obese. He is not ill-appearing. HENT:      Head: Normocephalic. Right Ear: Tympanic membrane, ear canal and external ear normal.      Left Ear: Tympanic membrane, ear canal and external ear normal.   Eyes:      Extraocular Movements: Extraocular movements intact. Conjunctiva/sclera: Conjunctivae normal.      Pupils: Pupils are equal, round, and reactive to light. Neck:      Vascular: No carotid bruit. Cardiovascular:      Rate and Rhythm: Normal rate and regular rhythm. Heart sounds: Normal heart sounds. Pulmonary:      Effort: Pulmonary effort is normal.      Breath sounds: Normal breath sounds. Abdominal:      Palpations: Abdomen is soft. Tenderness: There is no abdominal tenderness.    Musculoskeletal:         General: No deformity. Cervical back: Neck supple. Right lower leg: No edema. Left lower leg: No edema. Skin:     General: Skin is warm and dry. Neurological:      General: No focal deficit present. Mental Status: He is alert and oriented to person, place, and time. Psychiatric:         Mood and Affect: Mood normal.         Behavior: Behavior normal.     Results for Dillon Méndez (MRN [id number]) as of 2/6/2022 14:17   Ref. Range 6/4/2020 09:51 2/2/2022 09:01   Color Latest Ref Range: Colorless,   Yellow   CLARITY Latest Ref Range: Clear, Sli   Clear   Specific Gravity Latest Ref Range: 1.005 - 1.03  1.020 1.021   pH (UA) Latest Ref Range: 5.0 - 8.0 pH 8.0 5.0   Protein Latest Ref Range: Negative, mg/dL 30* (A) Negative   Glucose Latest Ref Range: Negative mg/dL Negative Negative   Ketone Latest Ref Range: Negative, mg/dL Negative Negative   Blood Latest Ref Range: Negative  Negative Small (A)   Bilirubin Latest Ref Range: Negative  Negative Negative   Urobilinogen Latest Ref Range: <2.0 mg/dL <2.0 <2.0   Nitrites Latest Ref Range: Negative  Negative Negative   Leukocyte Esterase Latest Ref Range: Negative  Negative Negative   WBC Latest Ref Range: 0 - 2 /hpf 0-2 0-2   RBC Latest Ref Range: Negative, /hpf 0-2 0-2   Results for Dillon Méndez (MRN [id number]) as of 2/6/2022 14:17   Ref.  Range 6/4/2020 09:51 2/2/2022 09:01   WBC Latest Ref Range: 4.0 - 11.0 K/uL 6.2 6.2   RBC Latest Ref Range: 3.80 - 5.80 M/uL 4.73 4.73   HGB Latest Ref Range: 13.1 - 17.2 g/dL 14.9 15.3   HCT Latest Ref Range: 39.3 - 51.6 % 47.0 43.4   MCV Latest Ref Range: 80 - 95 fL 99 (H) 92   MCH Latest Ref Range: 26 - 34 pg 32 32   MCHC Latest Ref Range: 31 - 36 g/dL 32 35   RDW Latest Ref Range: 10.0 - 15.5 % 13.1 12.1   PLATELET Latest Ref Range: 140 - 440 K/uL 169 155   MPV Latest Ref Range: 9.0 - 13.0 fL 9.2 9.8   NEUTROPHILS Latest Ref Range: 40 - 75 % 43 45   Lymphocytes Latest Ref Range: 20 - 45 % 43 43   MONOCYTES Latest Ref Range: 3 - 12 % 10 9   EOSINOPHILS Latest Ref Range: 0 - 6 % 2 2   BASOPHILS Latest Ref Range: 0 - 2 % 1 1   ABS. NEUTROPHILS Latest Ref Range: 1.8 - 7.7 K/uL 2.7 2.8   ABS. MONOCYTES Latest Ref Range: 0.1 - 1.0 K/uL 0.6 0.5   ABS. EOSINOPHILS Latest Ref Range: 0.0 - 0.5 K/uL 0.1 0.1   ABS. BASOPHILS Latest Ref Range: 0.0 - 0.2 K/uL 0.1 0.1   Results for Eugenio Beal (MRN [id number]) as of 2/6/2022 14:17   Ref. Range 7/19/2021 08:40 2/2/2022 09:01   Sodium Latest Ref Range: 133 - 145 mmol/L 137 138   Potassium Latest Ref Range: 3.5 - 5.5 mmol/L 4.4 4.4   Chloride Latest Ref Range: 98 - 110 mmol/L 100 100   CO2 Latest Ref Range: 20 - 32 mmol/L 26 25   Anion gap Latest Ref Range: 3.0 - 15.0 mmol/L 11.0 13.0   Glucose Latest Ref Range: 70 - 99 mg/dL 115 (H) 108 (H)   BUN Latest Ref Range: 6 - 22 mg/dL 17 17   Creatinine Latest Ref Range: 0.8 - 1.6 mg/dL 0.7 (L) 0.7 (L)   Calcium Latest Ref Range: 8.4 - 10.5 mg/dL 9.6 9.4   Bilirubin, total Latest Ref Range: 0.2 - 1.2 mg/dL  0.7   Protein, total Latest Ref Range: 6.2 - 8.1 g/dL  7.4   Albumin Latest Ref Range: 3.5 - 5.0 g/dL  4.5   Globulin Latest Ref Range: 2.0 - 4.0 g/dL  2.9   A-G Ratio Latest Ref Range: 1.1 - 2.6 ratio  1.6   ALT Latest Ref Range: 5 - 40 U/L  33   AST Latest Ref Range: 10 - 37 U/L  23   Alk. phosphatase Latest Ref Range: 40 - 125 U/L  84   Triglyceride Latest Ref Range: 40 - 149 mg/dL 155 (H) 96   Cholesterol, total Latest Ref Range: 110 - 200 mg/dL 137 120   HDL Cholesterol Latest Ref Range: >=40 mg/dL 43 37 (L)   CHOLESTEROL/HDL Latest Ref Range: 0.0 - 5.0  3.2 3.2   Non-HDL Cholesterol Latest Ref Range: <130 mg/dL 94 84   VLDL, calculated Latest Ref Range: 8 - 30 mg/dL 31 (H) 19   LDL, calculated Latest Ref Range: 50 - 99 mg/dL 63 64   LDL/HDL Ratio Unknown 1.5 1.8   Hemoglobin A1c, (calculated) Latest Ref Range: 4.8 - 5.6 % 6.0 (H) 5.9 (H)   Results for Eugenio Beal (MRN [id number]) as of 2/6/2022 14:17   Ref.  Range 2/8/2021 15:56 7/19/2021 08:40 7/19/2021 08:40 7/19/2021 08:40 7/19/2021 08:40 7/19/2021 08:40 2/2/2022 09:01   TSH Latest Ref Range: 0.27 - 4.20 mcU/mL       2.00   Glucose Latest Ref Range: 70 - 99 mg/dL  115 (H)     108 (H)   Prostate Specific Ag Latest Ref Range: <=4.100 ng/mL  0.510     0.460   COLOGUARD TEST Unknown Attch         VITAMIN D, 25-HYDROXY Latest Ref Range: 32.0 - 100.0 ng/mL  50.3     47.5   VITAMIN D, 25 HYDROXY Unknown  Rpt     Rpt       ASSESSMENT and PLAN    ICD-10-CM ICD-9-CM    1. Essential hypertension  I10 401.9    2. Mixed hyperlipidemia  E78.2 272.2    3. Thoracic aortic aneurysm without rupture (HCC)  I71.2 441.2    4. Chronic obstructive pulmonary disease, unspecified COPD type ([facility]  J44.9 496    5. Prediabetes  R73.03 790.29    6. Vitamin D deficiency  E55.9 268.9    7. Severe obesity (BMI 35.0-39. 9) with comorbidity ([facility]  E66.01 278.01    8. Screening for lung cancer  Z12.2 V76.0 CT LOW DOSE LUNG CANCER SCREENING   Assessment:  Hypertension adequately controlled  Thoracic aortic aneurysm without rupture-most recent imaging within 2 years per patient  Satisfactory lipid levels  Prediabetes without progression  Satisfactory vitamin D level  Obesity with modest improvement    Health maintenance recommendations:  COVID-19 immunization booster-completed  Influenza immunization-completed  Low-dose chest CT in view of 40-pack-year cigarette smoking -ordered    Plan:  Low-dose chest CT scan ordered after discussion with the patient and informed decision making and to proceed with this acknowledging plan for annual follow-up studies  Continue current medications  Call back if pulmonary medicine referral is needed  Return for annual physical exam and follow-up in 1 year or sooner with any problems    Seven Fish MD      PLEASE NOTE:   This document has been produced using voice recognition software. Unrecognized errors in transcription may be present.

## 2022-02-07 ENCOUNTER — OFFICE VISIT (OUTPATIENT)
Dept: FAMILY MEDICINE CLINIC | Age: 62
End: 2022-02-07
Payer: COMMERCIAL

## 2022-02-07 VITALS
BODY MASS INDEX: 35.78 KG/M2 | SYSTOLIC BLOOD PRESSURE: 134 MMHG | HEIGHT: 73 IN | TEMPERATURE: 98.3 F | HEART RATE: 77 BPM | DIASTOLIC BLOOD PRESSURE: 70 MMHG | OXYGEN SATURATION: 98 % | WEIGHT: 270 LBS | RESPIRATION RATE: 16 BRPM

## 2022-02-07 DIAGNOSIS — Z12.2 SCREENING FOR LUNG CANCER: ICD-10-CM

## 2022-02-07 DIAGNOSIS — R73.03 PREDIABETES: ICD-10-CM

## 2022-02-07 DIAGNOSIS — I10 ESSENTIAL HYPERTENSION: Primary | ICD-10-CM

## 2022-02-07 DIAGNOSIS — E78.2 MIXED HYPERLIPIDEMIA: ICD-10-CM

## 2022-02-07 DIAGNOSIS — J44.9 CHRONIC OBSTRUCTIVE PULMONARY DISEASE, UNSPECIFIED COPD TYPE (HCC): ICD-10-CM

## 2022-02-07 DIAGNOSIS — I71.20 THORACIC AORTIC ANEURYSM WITHOUT RUPTURE: ICD-10-CM

## 2022-02-07 DIAGNOSIS — E55.9 VITAMIN D DEFICIENCY: ICD-10-CM

## 2022-02-07 DIAGNOSIS — E66.01 SEVERE OBESITY (BMI 35.0-39.9) WITH COMORBIDITY (HCC): ICD-10-CM

## 2022-02-07 PROCEDURE — 99396 PREV VISIT EST AGE 40-64: CPT | Performed by: FAMILY MEDICINE

## 2022-02-07 NOTE — PROGRESS NOTES
Omid Ibrahim is a 59 y.o. male (: 1960) presenting to address:    Chief Complaint   Patient presents with    Hypertension       Vitals:    22 0934   BP: 134/70   Pulse: 77   Resp: 16   Temp: 98.3 °F (36.8 °C)   TempSrc: Temporal   SpO2: 98%   Weight: 270 lb (122.5 kg)   Height: 6' 1\" (1.854 m)   PainSc:   0 - No pain       Hearing/Vision:   No exam data present    Learning Assessment:     Learning Assessment 2021   PRIMARY LEARNER Patient   HIGHEST LEVEL OF EDUCATION - PRIMARY LEARNER  GRADUATED HIGH SCHOOL OR GED   BARRIERS PRIMARY LEARNER NONE   CO-LEARNER CAREGIVER No   PRIMARY LANGUAGE ENGLISH    NEED No   LEARNER PREFERENCE PRIMARY DEMONSTRATION   ANSWERED BY patient   RELATIONSHIP SELF     Depression Screening:     3 most recent PHQ Screens 2022   Little interest or pleasure in doing things Not at all   Feeling down, depressed, irritable, or hopeless Not at all   Total Score PHQ 2 0     Fall Risk Assessment:     Fall Risk Assessment, last 12 mths 2021   Able to walk? Yes   Fall in past 12 months? 1   Do you feel unsteady? 1   Are you worried about falling 0   Is the gait abnormal? 0   Number of falls in past 12 months 1   Fall with injury? 1     Abuse Screening:     Abuse Screening Questionnaire 2021   Do you ever feel afraid of your partner? N   Are you in a relationship with someone who physically or mentally threatens you? N   Is it safe for you to go home? Y     ADL Assessment:   No flowsheet data found. Coordination of Care Questionaire:   1. \"Have you been to the ER, urgent care clinic since your last visit? Hospitalized since your last visit? \" No    2. \"Have you seen or consulted any other health care providers outside of the [address] [place] [address] since your last visit? \" No     3. For patients aged 39-70: Has the patient had a colonoscopy? Yes - no Care Gap present       Advanced Directive:   1. Do you have an Advanced Directive? NO    2.  Would you like information on Advanced Directives?  NO

## 2022-02-07 NOTE — PATIENT INSTRUCTIONS
Assessment:  Hypertension adequately controlled  Thoracic aortic aneurysm without rupture-most recent imaging within 2 years per patient  Satisfactory lipid levels  Prediabetes without progression  Satisfactory vitamin D level  Obesity with modest improvement    Health maintenance recommendations:  COVID-19 immunization booster-completed  Influenza immunization-completed  Low-dose chest CT in view of 40-pack-year cigarette smoking -ordered    Plan:  Continue current medications  Return for annual physical exam and follow-up in 1 year or sooner with any problems week(s)/2

## 2022-02-10 RX ORDER — METOPROLOL SUCCINATE 25 MG/1
TABLET, EXTENDED RELEASE ORAL
Qty: 180 TABLET | Refills: 3 | Status: SHIPPED | OUTPATIENT
Start: 2022-02-10

## 2022-02-11 PROBLEM — K57.80 PERFORATED DIVERTICULUM: Status: ACTIVE | Noted: 2022-02-11

## 2022-02-11 PROBLEM — K57.92 ACUTE DIVERTICULITIS OF INTESTINE: Status: ACTIVE | Noted: 2022-02-11

## 2022-02-17 ENCOUNTER — OFFICE VISIT (OUTPATIENT)
Dept: FAMILY MEDICINE CLINIC | Age: 62
End: 2022-02-17
Payer: COMMERCIAL

## 2022-02-17 VITALS
BODY MASS INDEX: 34.99 KG/M2 | WEIGHT: 264 LBS | DIASTOLIC BLOOD PRESSURE: 62 MMHG | OXYGEN SATURATION: 97 % | TEMPERATURE: 98.3 F | HEIGHT: 73 IN | RESPIRATION RATE: 16 BRPM | HEART RATE: 68 BPM | SYSTOLIC BLOOD PRESSURE: 126 MMHG

## 2022-02-17 DIAGNOSIS — K57.20 DIVERTICULITIS OF LARGE INTESTINE WITH PERFORATION WITHOUT BLEEDING: Primary | ICD-10-CM

## 2022-02-17 DIAGNOSIS — Z09 HOSPITAL DISCHARGE FOLLOW-UP: ICD-10-CM

## 2022-02-17 PROCEDURE — 99213 OFFICE O/P EST LOW 20 MIN: CPT | Performed by: FAMILY MEDICINE

## 2022-02-17 NOTE — PROGRESS NOTES
Daron Le is a 59 y.o. male (: 1960) presenting to address:    Chief Complaint   Patient presents with    Diverticulitis     follow up from hospital        Vitals:    22 1249   BP: 126/62   Pulse: 68   Resp: 16   Temp: 98.3 °F (36.8 °C)   TempSrc: Temporal   SpO2: 97%   Weight: 264 lb (119.7 kg)   Height: 6' 1\" (1.854 m)   PainSc:   0 - No pain       Hearing/Vision:   No exam data present    Learning Assessment:     Learning Assessment 2021   PRIMARY LEARNER Patient   HIGHEST LEVEL OF EDUCATION - PRIMARY LEARNER  GRADUATED HIGH SCHOOL OR GED   BARRIERS PRIMARY LEARNER NONE   CO-LEARNER CAREGIVER No   PRIMARY LANGUAGE ENGLISH    NEED No   LEARNER PREFERENCE PRIMARY DEMONSTRATION   ANSWERED BY patient   RELATIONSHIP SELF     Depression Screening:     3 most recent PHQ Screens 2022   Little interest or pleasure in doing things Not at all   Feeling down, depressed, irritable, or hopeless Not at all   Total Score PHQ 2 0     Fall Risk Assessment:     Fall Risk Assessment, last 12 mths 2021   Able to walk? Yes   Fall in past 12 months? 1   Do you feel unsteady? 1   Are you worried about falling 0   Is the gait abnormal? 0   Number of falls in past 12 months 1   Fall with injury? 1     Abuse Screening:     Abuse Screening Questionnaire 2021   Do you ever feel afraid of your partner? N   Are you in a relationship with someone who physically or mentally threatens you? N   Is it safe for you to go home? Y     ADL Assessment:   No flowsheet data found. Coordination of Care Questionaire:   1. \"Have you been to the ER, urgent care clinic since your last visit? Hospitalized since your last visit? \"yes see [facility] care . 2. \"Have you seen or consulted any other health care providers outside of the [address] [place] [address] since your last visit? \" No     3. For patients aged 39-70: Has the patient had a colonoscopy? Yes - no Care Gap present         Advanced Directive:   1. Do you have an Advanced Directive? NO    2. Would you like information on Advanced Directives?  NO

## 2022-02-17 NOTE — PROGRESS NOTES
HISTORY OF PRESENT ILLNESS  Mala Mix is a 59 y.o. male. He presents for follow-up after hospital admission with diverticulitis and a bowel perforation which was successfully treated conservatively with antibiotics. Today he reports that he feels well with no abdominal pain and is able to ingest food and fluids. He is taking Augmentin as recommended. Admit date: 2/11/2022  Discharge date:  02/14/22    Hospital course:  Patient was admitted to the medical floor with acute diverticulitis with perforation. Patient was continued on empiric IV Zosyn, bowel rest, IV fluid hydration. General surgery, infectious disease evaluated patient continued to improve with supportive care. Abdominal pain resolved, patient had bowel movement. Diet started with liquids and tolerated. Renal function, electrolytes closely monitored. Patient's lactate, pro-Santhosh negative. Infectious disease streamline antibiotic to oral Augmentin for 10 to 14 days to treat for perforation. General surgery cleared for discharge with soft diet and to gradually advance to regular diet. Outpatient follow-up with surgery in 2 weeks.     Mr#: [id number]      Past Medical History:   Diagnosis Date    Aortic aneurysm     Coronary artery disease     LAD - 4.0 x 18mm XIENCE 4/16, OM1 - 4.0 x 15mm XIENCE 4/16     Dyslipidemia     Granuloma annulare     Hypertension     Mitral regurgitation     moderate (TTE 02/17),  moderate (ANITA 4/16)    Pulmonary fibrosis     upper lobes bilat (CTA 12/16)    Remote tobacco use     quit 2012    Seborrheic dermatitis        Past Surgical History:   Procedure Laterality Date    HX CERVICAL FUSION      cervical fusion    HX ORTHOPAEDIC      prior (L) knee surgery       Family History   Problem Relation Age of Onset    Post-op Nausea/Vomiting Mother     Cancer Mother         lung cancer     Heart Disease Father     Headache Father     Hypertension Maternal Grandmother     Heart Disease Maternal Grandmother     Cancer Paternal Grandmother         throat cancer     Diabetes Neg Hx     Stroke Neg Hx        Allergies   Allergen Reactions    Brilinta [Ticagrelor] Other (comments)     dyspnea    Sildenafil Other (comments)     Flushing    Vardenafil Other (comments)     Flushing       Social History     Tobacco Use   Smoking Status Former Smoker    Packs/day: 1.00    Years: 40.00    Pack years: 40.00    Types: Cigarettes    Quit date: 8/10/2013    Years since quittin.5   Smokeless Tobacco Never Used   Tobacco Comment    30 years smoking, stopped Oct. 2012       Social History     Substance and Sexual Activity   Alcohol Use Yes    Alcohol/week: 0.0 standard drinks    Comment: beer 5-18 on weekends           Patient Active Problem List   Diagnosis Code    Essential hypertension I10    Mixed hyperlipidemia E78.2    Mitral regurgitation I34.0    Aortic aneurysm I71.9    Pulmonary fibrosis J84.10    Vitamin D deficiency E55.9    Bilateral carpal tunnel syndrome G56.03    Ex-smoker Z87.891    Granulomatous lung disease ([facility] J84.10    Coronary artery disease involving native coronary artery of native heart without angina pectoris I25.10    Obesity (BMI 30-39. 9) E66.9    Borderline diabetes R73.03    Severe obesity (BMI 35.0-39. 9) with comorbidity ([facility] E66.01    Acute diverticulitis of intestine K57.92    Perforated diverticulum K57.80         Current Outpatient Medications:     amoxicillin-clavulanate (AUGMENTIN) 875-125 mg per tablet, Take 1 Tablet by mouth every twelve (12) hours for 11 days. , Disp: 22 Tablet, Rfl: 0    Saccharomyces boulardii (Florastor) 250 mg capsule, Take 1 Capsule by mouth two (2) times a day for 14 days. , Disp: 28 Capsule, Rfl: 0    metoprolol succinate (TOPROL-XL) 25 mg XL tablet, take 1 tablet by mouth twice a day, Disp: 180 Tablet, Rfl: 3    losartan (COZAAR) 100 mg tablet, take 1 tablet by mouth once daily, Disp: 90 Tablet, Rfl: 3    ezetimibe (Derral Faith) 10 mg tablet, take 1 tablet by mouth once daily, Disp: 90 Tablet, Rfl: 4    ergocalciferol (Vitamin D2) 1,250 mcg (50,000 unit) capsule, take 1 capsule by mouth every week, Disp: 12 Capsule, Rfl: 3    atorvastatin (LIPITOR) 80 mg tablet, take 1 tablet by mouth once daily, Disp: 90 Tab, Rfl: 4    albuterol (PROVENTIL HFA, VENTOLIN HFA, PROAIR HFA) 90 mcg/actuation inhaler, Take 1 Puff by inhalation every four (4) hours as needed for Wheezing., Disp: 1 Inhaler, Rfl: 12    nitroglycerin (NITROSTAT) 0.4 mg SL tablet, 1 Tab by SubLINGual route every five (5) minutes as needed for Chest Pain., Disp: 25 Tab, Rfl: 3    ASPIRIN (ASPIR-81 PO), Take 81 mg by mouth three (3) times daily. , Disp: , Rfl:     omega-3 fatty acids-vitamin e (FISH OIL) 1,000 mg cap, Take 4,000 mg by mouth daily. , Disp: , Rfl:          Review of Systems   Constitutional: Negative for chills and fever. Cardiovascular: Negative for chest pain, palpitations and leg swelling. Gastrointestinal: Negative for abdominal pain, blood in stool, diarrhea, melena, nausea and vomiting. Visit Vitals  /62   Pulse 68   Temp 98.3 °F (36.8 °C) (Temporal)   Resp 16   Ht 6' 1\" (1.854 m)   Wt 264 lb (119.7 kg)   SpO2 97%   BMI 34.83 kg/m²       Physical Exam  Vitals and nursing note reviewed. Constitutional:       General: He is not in acute distress. Appearance: Normal appearance. He is well-developed. He is not ill-appearing. HENT:      Head: Normocephalic. Eyes:      Extraocular Movements: Extraocular movements intact. Cardiovascular:      Rate and Rhythm: Normal rate and regular rhythm. Heart sounds: Normal heart sounds. Pulmonary:      Effort: Pulmonary effort is normal.      Breath sounds: Normal breath sounds. Abdominal:      General: Bowel sounds are normal.      Palpations: Abdomen is soft. Tenderness: There is no abdominal tenderness. There is no guarding or rebound. Musculoskeletal:      Cervical back: Neck supple. Skin:     General: Skin is warm and dry. Neurological:      Mental Status: He is alert and oriented to person, place, and time. Psychiatric:         Mood and Affect: Mood normal.         Behavior: Behavior normal.         Thought Content: Thought content normal.         ASSESSMENT and PLAN    ICD-10-CM ICD-9-CM    1. Diverticulitis of large intestine with perforation without bleeding  K57.20 562.11 CBC WITH AUTOMATED DIFF     917.23 METABOLIC PANEL, BASIC   2. Hospital discharge follow-up  Z09 V67.59    Assessment:  Satisfactory course following hospital discharge    Plan:  Return for lab studies on Monday, 2/21/2022  Continue Augmentin and complete the course as prescribed  General surgery follow-up as scheduled  Report back immediately with new or worsening symptoms. Madeleine Robledo MD      PLEASE NOTE:   This document has been produced using voice recognition software. Unrecognized errors in transcription may be present.

## 2022-02-17 NOTE — PATIENT INSTRUCTIONS
Return for lab studies on Monday, 2/21/2022  Continue Augmentin and complete the course as prescribed  General surgery follow-up as scheduled  Report back immediately with new or worsening symptoms.

## 2022-02-21 ENCOUNTER — APPOINTMENT (OUTPATIENT)
Dept: FAMILY MEDICINE CLINIC | Age: 62
End: 2022-02-21

## 2022-02-21 DIAGNOSIS — K57.20 DIVERTICULITIS OF LARGE INTESTINE WITH PERFORATION WITHOUT BLEEDING: ICD-10-CM

## 2022-02-22 LAB
ABSOLUTE LYMPHOCYTE COUNT, 10803: 3.7 K/UL (ref 1–4.8)
ANION GAP SERPL CALC-SCNC: 9 MMOL/L (ref 3–15)
BASOPHILS # BLD: 0.1 K/UL (ref 0–0.2)
BASOPHILS NFR BLD: 1 % (ref 0–2)
BUN SERPL-MCNC: 11 MG/DL (ref 6–22)
CALCIUM SERPL-MCNC: 9.6 MG/DL (ref 8.4–10.5)
CHLORIDE SERPL-SCNC: 102 MMOL/L (ref 98–110)
CO2 SERPL-SCNC: 28 MMOL/L (ref 20–32)
CREAT SERPL-MCNC: 0.7 MG/DL (ref 0.8–1.6)
EOSINOPHIL # BLD: 0.2 K/UL (ref 0–0.5)
EOSINOPHIL NFR BLD: 3 % (ref 0–6)
ERYTHROCYTE [DISTWIDTH] IN BLOOD BY AUTOMATED COUNT: 13.2 % (ref 10–15.5)
GFRAA, 66117: >60
GFRNA, 66118: >60
GLUCOSE SERPL-MCNC: 106 MG/DL (ref 70–99)
GRANULOCYTES,GRANS: 37 % (ref 40–75)
HCT VFR BLD AUTO: 44.3 % (ref 39.3–51.6)
HGB BLD-MCNC: 14.1 G/DL (ref 13.1–17.2)
LYMPHOCYTES, LYMLT: 50 % (ref 20–45)
MCH RBC QN AUTO: 32 PG (ref 26–34)
MCHC RBC AUTO-ENTMCNC: 32 G/DL (ref 31–36)
MCV RBC AUTO: 99 FL (ref 80–95)
MONOCYTES # BLD: 0.6 K/UL (ref 0.1–1)
MONOCYTES NFR BLD: 9 % (ref 3–12)
NEUTROPHILS # BLD AUTO: 2.7 K/UL (ref 1.8–7.7)
PLATELET # BLD AUTO: 236 K/UL (ref 140–440)
PMV BLD AUTO: 9.5 FL (ref 9–13)
POTASSIUM SERPL-SCNC: 4.4 MMOL/L (ref 3.5–5.5)
RBC # BLD AUTO: 4.47 M/UL (ref 3.8–5.8)
SODIUM SERPL-SCNC: 139 MMOL/L (ref 133–145)
WBC # BLD AUTO: 7.3 K/UL (ref 4–11)

## 2022-02-24 ENCOUNTER — TELEPHONE (OUTPATIENT)
Dept: FAMILY MEDICINE CLINIC | Age: 62
End: 2022-02-24

## 2022-02-24 NOTE — TELEPHONE ENCOUNTER
Please advise Mr. Scotty Sheehan that the recent chest CT showed no suspicious pulmonary nodules and should be repeated in 1 year.

## 2022-03-08 DIAGNOSIS — Z12.2 SCREENING FOR LUNG CANCER: ICD-10-CM

## 2022-03-19 PROBLEM — R73.03 BORDERLINE DIABETES: Status: ACTIVE | Noted: 2018-03-16

## 2022-03-19 PROBLEM — K57.92 ACUTE DIVERTICULITIS OF INTESTINE: Status: ACTIVE | Noted: 2022-02-11

## 2022-03-19 PROBLEM — I25.10 CORONARY ARTERY DISEASE INVOLVING NATIVE CORONARY ARTERY OF NATIVE HEART WITHOUT ANGINA PECTORIS: Status: ACTIVE | Noted: 2017-09-15

## 2022-03-19 PROBLEM — K57.80 PERFORATED DIVERTICULUM: Status: ACTIVE | Noted: 2022-02-11

## 2022-03-19 PROBLEM — E66.9 OBESITY (BMI 30-39.9): Status: ACTIVE | Noted: 2017-09-15

## 2022-03-19 PROBLEM — E66.01 SEVERE OBESITY (BMI 35.0-39.9) WITH COMORBIDITY (HCC): Status: ACTIVE | Noted: 2018-09-19

## 2022-04-12 DIAGNOSIS — E78.2 MIXED HYPERLIPIDEMIA: ICD-10-CM

## 2022-04-12 RX ORDER — ATORVASTATIN CALCIUM 80 MG/1
TABLET, FILM COATED ORAL
Qty: 90 TABLET | Refills: 4 | Status: SHIPPED | OUTPATIENT
Start: 2022-04-12

## 2022-09-01 DIAGNOSIS — E78.5 DYSLIPIDEMIA: ICD-10-CM

## 2022-09-01 RX ORDER — EZETIMIBE 10 MG/1
TABLET ORAL
Qty: 90 TABLET | Refills: 4 | Status: SHIPPED | OUTPATIENT
Start: 2022-09-01

## 2022-09-29 DIAGNOSIS — E55.9 VITAMIN D DEFICIENCY: ICD-10-CM

## 2022-09-29 RX ORDER — ERGOCALCIFEROL 1.25 MG/1
CAPSULE ORAL
Qty: 12 CAPSULE | Refills: 3 | Status: SHIPPED | OUTPATIENT
Start: 2022-09-29

## 2022-10-30 DIAGNOSIS — I10 ESSENTIAL HYPERTENSION: ICD-10-CM

## 2022-10-30 DIAGNOSIS — R73.03 PREDIABETES: ICD-10-CM

## 2022-10-30 DIAGNOSIS — E55.9 VITAMIN D DEFICIENCY: ICD-10-CM

## 2022-10-30 DIAGNOSIS — Z00.00 ROUTINE HEALTH MAINTENANCE: Primary | ICD-10-CM

## 2022-10-30 DIAGNOSIS — E78.2 MIXED HYPERLIPIDEMIA: ICD-10-CM

## 2022-10-30 RX ORDER — LOSARTAN POTASSIUM 100 MG/1
TABLET ORAL
Qty: 90 TABLET | Refills: 0 | Status: SHIPPED | OUTPATIENT
Start: 2022-10-30

## 2022-10-30 NOTE — TELEPHONE ENCOUNTER
Losartan has been refilled for 90 days, the patient is due for lab appointment followed by annual physical exam appointment after 1/7/2023. Please assist him with scheduling and necessary appointments. Lab orders have been entered.

## 2022-11-04 NOTE — TELEPHONE ENCOUNTER
ANDRA for patient to schedule for his lab appointment (fasting) and his annual physical after Jan. 7,2023

## 2023-02-02 ENCOUNTER — TELEPHONE (OUTPATIENT)
Dept: FAMILY MEDICINE CLINIC | Age: 63
End: 2023-02-02

## 2023-02-02 ENCOUNTER — TELEPHONE (OUTPATIENT)
Dept: FAMILY MEDICINE CLINIC | Facility: CLINIC | Age: 63
End: 2023-02-02

## 2023-02-02 NOTE — TELEPHONE ENCOUNTER
----- Message from Kimmie Jensen sent at 2/1/2023 12:40 PM EST -----  Subject: Appointment Request    Reason for Call: Established Patient Appointment needed: Routine Physical   Exam    QUESTIONS    Reason for appointment request? Available appointments did not meet   patient need     Additional Information for Provider?  Patient called in states that he   needs he needs his physical before 3/1 because his insurance will not be   accepted please call patient   ---------------------------------------------------------------------------  --------------  [address]  [phone]; OK to leave message on voicemail  ---------------------------------------------------------------------------  --------------  SCRIPT ANSWERS  COVID Screen: Erick Rod

## 2023-02-02 NOTE — TELEPHONE ENCOUNTER
----- Message from Bernardelie Guzman sent at 2/1/2023 12:40 PM EST -----  Subject: Appointment Request    Reason for Call: Established Patient Appointment needed: Routine Physical   Exam    QUESTIONS    Reason for appointment request? Available appointments did not meet   patient need     Additional Information for Provider?  Patient called in states that he   needs he needs his physical before 3/1 because his insurance will not be   accepted please call patient   ---------------------------------------------------------------------------  --------------  [address]  [phone]; OK to leave message on voicemail  ---------------------------------------------------------------------------  --------------  SCRIPT ANSWERS  COVID Screen: Nilda Holguin

## 2023-02-02 NOTE — TELEPHONE ENCOUNTER
Spoke to patient and I have him scheduled for tomorrow.      Future Appointments   Date Time Provider Tameka De Paz   2/3/2023  2:00 PM Jhon Parikh MD [facility]

## 2023-02-03 ENCOUNTER — APPOINTMENT (OUTPATIENT)
Dept: FAMILY MEDICINE CLINIC | Age: 63
End: 2023-02-03

## 2023-02-03 ENCOUNTER — OFFICE VISIT (OUTPATIENT)
Dept: FAMILY MEDICINE CLINIC | Age: 63
End: 2023-02-03
Payer: COMMERCIAL

## 2023-02-03 VITALS
DIASTOLIC BLOOD PRESSURE: 78 MMHG | RESPIRATION RATE: 18 BRPM | WEIGHT: 272 LBS | OXYGEN SATURATION: 98 % | TEMPERATURE: 98.2 F | HEIGHT: 73 IN | BODY MASS INDEX: 36.05 KG/M2 | HEART RATE: 79 BPM | SYSTOLIC BLOOD PRESSURE: 110 MMHG

## 2023-02-03 DIAGNOSIS — I10 ESSENTIAL HYPERTENSION: ICD-10-CM

## 2023-02-03 DIAGNOSIS — E55.9 VITAMIN D DEFICIENCY: ICD-10-CM

## 2023-02-03 DIAGNOSIS — Z00.00 ROUTINE GENERAL MEDICAL EXAMINATION AT A HEALTH CARE FACILITY: Primary | ICD-10-CM

## 2023-02-03 DIAGNOSIS — E78.2 MIXED HYPERLIPIDEMIA: ICD-10-CM

## 2023-02-03 DIAGNOSIS — I71.21 ANEURYSM OF ASCENDING AORTA WITHOUT RUPTURE: ICD-10-CM

## 2023-02-03 DIAGNOSIS — R73.03 PREDIABETES: ICD-10-CM

## 2023-02-03 DIAGNOSIS — M25.561 CHRONIC PAIN OF BOTH KNEES: ICD-10-CM

## 2023-02-03 DIAGNOSIS — J44.9 CHRONIC OBSTRUCTIVE PULMONARY DISEASE, UNSPECIFIED COPD TYPE (HCC): ICD-10-CM

## 2023-02-03 DIAGNOSIS — G89.29 CHRONIC PAIN OF BOTH KNEES: ICD-10-CM

## 2023-02-03 DIAGNOSIS — R06.09 DOE (DYSPNEA ON EXERTION): ICD-10-CM

## 2023-02-03 DIAGNOSIS — M25.562 CHRONIC PAIN OF BOTH KNEES: ICD-10-CM

## 2023-02-03 DIAGNOSIS — I25.10 CORONARY ARTERY DISEASE INVOLVING NATIVE CORONARY ARTERY OF NATIVE HEART WITHOUT ANGINA PECTORIS: ICD-10-CM

## 2023-02-03 DIAGNOSIS — Z23 NEEDS FLU SHOT: ICD-10-CM

## 2023-02-03 DIAGNOSIS — Z00.00 ROUTINE HEALTH MAINTENANCE: ICD-10-CM

## 2023-02-03 RX ORDER — DICLOFENAC SODIUM 75 MG/1
TABLET, DELAYED RELEASE ORAL
Qty: 180 TABLET | Refills: 1 | Status: SHIPPED | OUTPATIENT
Start: 2023-02-03

## 2023-02-03 RX ORDER — METOPROLOL SUCCINATE 25 MG/1
25 TABLET, EXTENDED RELEASE ORAL 2 TIMES DAILY
Qty: 180 TABLET | Refills: 3 | Status: SHIPPED | OUTPATIENT
Start: 2023-02-03

## 2023-02-03 RX ORDER — LOSARTAN POTASSIUM 100 MG/1
100 TABLET ORAL DAILY
Qty: 90 TABLET | Refills: 3 | Status: SHIPPED | OUTPATIENT
Start: 2023-02-03

## 2023-02-03 RX ORDER — ALBUTEROL SULFATE 90 UG/1
1 AEROSOL, METERED RESPIRATORY (INHALATION)
Qty: 1 EACH | Refills: 12 | Status: SHIPPED | OUTPATIENT
Start: 2023-02-03

## 2023-02-03 NOTE — PROGRESS NOTES
Cole Macias is a 57 y.o. male (: 1960) presenting to address:    Chief Complaint   Patient presents with    Physical    Medication Refill       Vitals:    23 1350   BP: 110/78   Pulse: 79   Resp: 18   Temp: 98.2 °F (36.8 °C)   TempSrc: Temporal   SpO2: 98%   Weight: 272 lb (123.4 kg)   Height: 6' 1\" (1.854 m)   PainSc:   4   PainLoc: Generalized       Hearing/Vision:   No results found. Learning Assessment:     Learning Assessment 2021   PRIMARY LEARNER Patient   HIGHEST LEVEL OF EDUCATION - PRIMARY LEARNER  GRADUATED HIGH SCHOOL OR GED   BARRIERS PRIMARY LEARNER NONE   CO-LEARNER CAREGIVER No   PRIMARY LANGUAGE ENGLISH    NEED No   LEARNER PREFERENCE PRIMARY DEMONSTRATION   ANSWERED BY patient   RELATIONSHIP SELF     Depression Screening:     3 most recent PHQ Screens 2/3/2023   Little interest or pleasure in doing things Not at all   Feeling down, depressed, irritable, or hopeless Not at all   Total Score PHQ 2 0     Fall Risk Assessment:     Fall Risk Assessment, last 12 mths 2021   Able to walk? Yes   Fall in past 12 months? 1   Do you feel unsteady? 1   Are you worried about falling 0   Is the gait abnormal? 0   Number of falls in past 12 months 1   Fall with injury? 1     Abuse Screening:     Abuse Screening Questionnaire 2021   Do you ever feel afraid of your partner? N   Are you in a relationship with someone who physically or mentally threatens you? N   Is it safe for you to go home? Y     ADL Assessment:   No flowsheet data found. Coordination of Care Questionaire:   1. \"Have you been to the ER, urgent care clinic since your last visit? Hospitalized since your last visit? \" No    2. \"Have you seen or consulted any other health care providers outside of the [address] [place] [address] since your last visit? \" No     3. For patients aged 39-70: Has the patient had a colonoscopy? Yes - Care Gap present.  Most recent result on file     If the patient is female:    4. For patients aged 41-77: Has the patient had a mammogram within the past 2 years? No    5. For patients aged 21-65: Has the patient had a pap smear? No    Advanced Directive:   1. Do you have an Advanced Directive? Yes.  2. Would you like information on Advanced Directives? NO    Immunization influenza administered 2/3/2023 by Donnie Orourke . Patient tolerated procedure well. No reactions noted.

## 2023-02-03 NOTE — PROGRESS NOTES
HISTORY OF PRESENT ILLNESS  Jt Ziegler is a 57 y.o. male.   He presents for health assessment, preventative care and follow-up with a history of hypertension, hyperlipidemia, coronary artery disease and thoracic aortic aneurysm followed by cardiology, COPD, prediabetes and vitamin D deficiency    Mr#: [id number]      Past Medical History:   Diagnosis Date    Aortic aneurysm     Coronary artery disease     LAD - 4.0 x 18mm XIENCE , OM1 - 4.0 x 15mm XIENCE      Dyslipidemia     Granuloma annulare     Hypertension     Mitral regurgitation     moderate (TTE ),  moderate (ANITA )    Pulmonary fibrosis     upper lobes bilat (CTA )    Remote tobacco use     quit     Seborrheic dermatitis        Past Surgical History:   Procedure Laterality Date    HX CERVICAL FUSION      cervical fusion    HX ORTHOPAEDIC      prior (L) knee surgery       Family History   Problem Relation Age of Onset    Post-op Nausea/Vomiting Mother     Cancer Mother         lung cancer     Heart Disease Father     Headache Father     Hypertension Maternal Grandmother     Heart Disease Maternal Grandmother     Cancer Paternal Grandmother         throat cancer     Diabetes Neg Hx     Stroke Neg Hx        Allergies   Allergen Reactions    Brilinta [Ticagrelor] Other (comments)     dyspnea    Sildenafil Other (comments)     Flushing    Vardenafil Other (comments)     Flushing       Social History     Tobacco Use   Smoking Status Former    Packs/day: 1.00    Years: 40.00    Pack years: 40.00    Types: Cigarettes    Quit date: 8/10/2013    Years since quittin.4   Smokeless Tobacco Never   Tobacco Comments    30 years smoking, stopped Oct. 2012       Social History     Substance and Sexual Activity   Alcohol Use Yes    Alcohol/week: 0.0 standard drinks    Comment: beer 5-18 on weekends           Patient Active Problem List   Diagnosis Code    Essential hypertension I10    Mixed hyperlipidemia E78.2    Mitral regurgitation I34.0 Aortic aneurysm I71.9    Pulmonary fibrosis J84.10    Vitamin D deficiency E55.9    Bilateral carpal tunnel syndrome G56.03    Ex-smoker Z87.891    Granulomatous lung disease ([facility] [address] J84.10    Coronary artery disease involving native coronary artery of native heart without angina pectoris I25.10    Obesity (BMI 30-39. 9) E66.9    Borderline diabetes R73.03    Severe obesity (BMI 35.0-39. 9) with comorbidity (HCC) E66.01    Acute diverticulitis of intestine K57.92    Perforated diverticulum K57.80         Current Outpatient Medications:     losartan (COZAAR) 100 mg tablet, take 1 tablet by mouth once daily, Disp: 90 Tablet, Rfl: 0    ergocalciferol (Vitamin D2) 1,250 mcg (50,000 unit) capsule, take 1 capsule by mouth every week, Disp: 12 Capsule, Rfl: 3    ezetimibe (ZETIA) 10 mg tablet, take 1 tablet by mouth once daily, Disp: 90 Tablet, Rfl: 4    atorvastatin (LIPITOR) 80 mg tablet, take 1 tablet by mouth once daily, Disp: 90 Tablet, Rfl: 4    metoprolol succinate (TOPROL-XL) 25 mg XL tablet, take 1 tablet by mouth twice a day, Disp: 180 Tablet, Rfl: 3    albuterol (PROVENTIL HFA, VENTOLIN HFA, PROAIR HFA) 90 mcg/actuation inhaler, Take 1 Puff by inhalation every four (4) hours as needed for Wheezing., Disp: 1 Inhaler, Rfl: 12    nitroglycerin (NITROSTAT) 0.4 mg SL tablet, 1 Tab by SubLINGual route every five (5) minutes as needed for Chest Pain., Disp: 25 Tab, Rfl: 3    ASPIRIN (ASPIR-81 PO), Take 81 mg by mouth three (3) times daily. , Disp: , Rfl:     omega-3 fatty acids-vitamin e 1,000 mg cap, Take 4,000 mg by mouth daily. , Disp: , Rfl:          Review of Systems   Constitutional:  Positive for malaise/fatigue. Negative for chills, fever and weight loss. HENT:  Positive for hearing loss. Negative for congestion, ear pain and sore throat. Eyes:  Negative for blurred vision and double vision. Respiratory:  Positive for shortness of breath. Negative for cough and wheezing.     Cardiovascular:  Negative for chest pain, palpitations and leg swelling. Gastrointestinal:  Negative for abdominal pain, blood in stool, constipation, diarrhea, heartburn, melena, nausea and vomiting. Genitourinary:  Negative for dysuria and urgency. Musculoskeletal:  Positive for joint pain (bilateral knee pain). Negative for myalgias. Skin:  Positive for itching (upper back). Negative for rash. Neurological:  Negative for dizziness, tingling, sensory change, focal weakness and headaches. Feels unsteady   Endo/Heme/Allergies:  Positive for environmental allergies. Psychiatric/Behavioral:  Negative for depression. The patient is not nervous/anxious and does not have insomnia. Visit Vitals  /78 (BP 1 Location: Right arm, BP Patient Position: Sitting, BP Cuff Size: Large adult)   Pulse 79   Temp 98.2 °F (36.8 °C) (Temporal)   Resp 18   Ht 6' 1\" (1.854 m)   Wt 272 lb (123.4 kg)   SpO2 98%   BMI 35.89 kg/m²       Physical Exam  Vitals and nursing note reviewed. Constitutional:       General: He is not in acute distress. Appearance: Normal appearance. He is obese. He is not ill-appearing. HENT:      Head: Normocephalic. Right Ear: Tympanic membrane, ear canal and external ear normal.      Left Ear: Tympanic membrane, ear canal and external ear normal.      Mouth/Throat:      Mouth: Mucous membranes are moist.      Pharynx: Oropharynx is clear. Eyes:      Extraocular Movements: Extraocular movements intact. Conjunctiva/sclera: Conjunctivae normal.      Pupils: Pupils are equal, round, and reactive to light. Neck:      Vascular: No carotid bruit. Cardiovascular:      Rate and Rhythm: Normal rate and regular rhythm. Heart sounds: Normal heart sounds. Pulmonary:      Effort: Pulmonary effort is normal.      Breath sounds: Normal breath sounds. Abdominal:      Palpations: Abdomen is soft. Tenderness: There is no abdominal tenderness. Musculoskeletal:         General: No deformity. Cervical back: Neck supple. Right lower leg: No edema. Left lower leg: No edema. Skin:     General: Skin is warm and dry. Neurological:      General: No focal deficit present. Mental Status: He is alert and oriented to person, place, and time. Psychiatric:         Mood and Affect: Mood normal.         Behavior: Behavior normal.       ASSESSMENT and PLAN    ICD-10-CM ICD-9-CM    1. Routine general medical examination at a health care facility  Z00.00 V70.0       2. Essential hypertension  I10 401.9 losartan (COZAAR) 100 mg tablet      3. Mixed hyperlipidemia  E78.2 272.2       4. Coronary artery disease involving native coronary artery of native heart without angina pectoris  I25.10 414.01       5. Prediabetes  R73.03 790.29       6. Aneurysm of ascending aorta without rupture  I71.21 441.2       7. Chronic obstructive pulmonary disease, unspecified COPD type (HCC)  J44.9 496 albuterol (PROVENTIL HFA, VENTOLIN HFA, PROAIR HFA) 90 mcg/actuation inhaler      8. Vitamin D deficiency  E55.9 268.9       9. Needs flu shot  Z23 V04.81 INFLUENZA, FLUARIX, FLULAVAL, FLUZONE (AGE 6 MO+), AFLURIA(AGE 3Y+) IM, PF, 0.5 ML      10. SMITH (dyspnea on exertion)  R06.09 786.09 albuterol (PROVENTIL HFA, VENTOLIN HFA, PROAIR HFA) 90 mcg/actuation inhaler      11.  Chronic pain of both knees  M25.561 719.46 diclofenac EC (VOLTAREN) 75 mg EC tablet    M25.562 338.29     G89.29        Assessment:  Hypertension well controlled  Status of hyperlipidemia and prediabetes as well as vitamin D deficiency to be determined pending lab results  No reported symptoms of cardiac ischemia  CTA for follow-up of aneurysm of the ascending thoracic aorta followed by cardiology/CTS  COPD symptoms followed by pulmonary medicine  Bilateral knee pain to be addressed x-rays from 2019 essentially unremarkable    Health maintenance recommendations:  COVID-19 immunization with bivalent vaccine if not already received  Influenza immunization given today    Plan:  Begin diclofenac 75 mg up to twice daily with food if needed for knee pain, stop for abdominal pain or dark stools  Obtain previously ordered lab studies  Further disposition pending lab results if indicated  Continue current medications pending lab results  Return for follow-up in 1 year or sooner with any problems  Please always arrive at least 15 minutes before your scheduled appointment time  Vargas Gray MD      PLEASE NOTE:   This document has been produced using voice recognition software. Unrecognized errors in transcription may be present.

## 2023-02-03 NOTE — PATIENT INSTRUCTIONS
Assessment:  Hypertension well controlled  Status of hyperlipidemia and prediabetes as well as vitamin D deficiency to be determined pending lab results  No reported symptoms of cardiac ischemia  CTA for follow-up of aneurysm of the ascending thoracic aorta  COPD symptoms  Bilateral knee pain    Health maintenance recommendations:  COVID-19 immunization with bivalent vaccine if not already received  Influenza immunization given today    Plan:  Begin diclofenac 75 mg up to twice daily with food if needed for knee pain, stop for abdominal pain or dark stools  Obtain previously ordered lab studies  Further disposition pending lab results if indicated  Continue current medications pending lab results  Return for follow-up in 1 year or sooner with any problems  Please always arrive at least 15 minutes before your scheduled appointment time

## 2023-02-04 LAB
25(OH)D3 SERPL-MCNC: 53.7 NG/ML (ref 32–100)
A-G RATIO,AGRAT: 1.5 RATIO (ref 1.1–2.6)
ABSOLUTE LYMPHOCYTE COUNT, 10803: 3.1 K/UL (ref 1–4.8)
ALBUMIN SERPL-MCNC: 4.5 G/DL (ref 3.5–5)
ALP SERPL-CCNC: 80 U/L (ref 40–125)
ALT SERPL-CCNC: 24 U/L (ref 5–40)
ANION GAP SERPL CALC-SCNC: 12 MMOL/L (ref 3–15)
AST SERPL W P-5'-P-CCNC: 20 U/L (ref 10–37)
AVG GLU, 10930: 126 MG/DL (ref 91–123)
BASOPHILS # BLD: 0.1 K/UL (ref 0–0.2)
BASOPHILS NFR BLD: 1 % (ref 0–2)
BILIRUB SERPL-MCNC: 0.6 MG/DL (ref 0.2–1.2)
BILIRUB UR QL: NEGATIVE
BUN SERPL-MCNC: 18 MG/DL (ref 6–22)
CALCIUM SERPL-MCNC: 9.5 MG/DL (ref 8.4–10.5)
CHLORIDE SERPL-SCNC: 102 MMOL/L (ref 98–110)
CHOLEST SERPL-MCNC: 130 MG/DL (ref 110–200)
CLARITY: CLEAR
CO2 SERPL-SCNC: 26 MMOL/L (ref 20–32)
COLOR UR: YELLOW
CREAT SERPL-MCNC: 0.6 MG/DL (ref 0.8–1.6)
EOSINOPHIL # BLD: 0.2 K/UL (ref 0–0.5)
EOSINOPHIL NFR BLD: 2 % (ref 0–6)
ERYTHROCYTE [DISTWIDTH] IN BLOOD BY AUTOMATED COUNT: 12.8 % (ref 10–15.5)
GLOBULIN,GLOB: 3 G/DL (ref 2–4)
GLOMERULAR FILTRATION RATE: >60 ML/MIN/1.73 SQ.M.
GLUCOSE SERPL-MCNC: 87 MG/DL (ref 70–99)
GLUCOSE UR QL: NEGATIVE MG/DL
GRANULOCYTES,GRANS: 49 % (ref 40–75)
HBA1C MFR BLD HPLC: 6 % (ref 4.8–5.6)
HCT VFR BLD AUTO: 43.6 % (ref 39.3–51.6)
HDLC SERPL-MCNC: 2.8 MG/DL (ref 0–5)
HDLC SERPL-MCNC: 46 MG/DL
HGB BLD-MCNC: 14.7 G/DL (ref 13.1–17.2)
HGB UR QL STRIP: NEGATIVE
KETONES UR QL STRIP.AUTO: NEGATIVE MG/DL
LDL/HDL RATIO,LDHD: 1.3
LDLC SERPL CALC-MCNC: 62 MG/DL (ref 50–99)
LEUKOCYTE ESTERASE: NEGATIVE
LYMPHOCYTES, LYMLT: 40 % (ref 20–45)
MCH RBC QN AUTO: 32 PG (ref 26–34)
MCHC RBC AUTO-ENTMCNC: 34 G/DL (ref 31–36)
MCV RBC AUTO: 94 FL (ref 80–95)
MONOCYTES # BLD: 0.6 K/UL (ref 0.1–1)
MONOCYTES NFR BLD: 8 % (ref 3–12)
NEUTROPHILS # BLD AUTO: 3.7 K/UL (ref 1.8–7.7)
NITRITE UR QL STRIP.AUTO: NEGATIVE
NON-HDL CHOLESTEROL, 011976: 84 MG/DL
PH UR STRIP: 5.5 PH (ref 5–8)
PLATELET # BLD AUTO: 189 K/UL (ref 140–440)
PMV BLD AUTO: 9.5 FL (ref 9–13)
POTASSIUM SERPL-SCNC: 4.3 MMOL/L (ref 3.5–5.5)
PROT SERPL-MCNC: 7.5 G/DL (ref 6.2–8.1)
PROT UR QL STRIP: NEGATIVE MG/DL
PSA SERPL-MCNC: 0.5 NG/ML
RBC # BLD AUTO: 4.66 M/UL (ref 3.8–5.8)
SODIUM SERPL-SCNC: 140 MMOL/L (ref 133–145)
SP GR UR: 1.02 (ref 1–1.03)
TRIGL SERPL-MCNC: 110 MG/DL (ref 40–149)
TSH SERPL DL<=0.005 MIU/L-ACNC: 1.31 MCU/ML (ref 0.27–4.2)
UROBILINOGEN UR STRIP-MCNC: 0.2 MG/DL
VLDLC SERPL CALC-MCNC: 22 MG/DL (ref 8–30)
WBC # BLD AUTO: 7.6 K/UL (ref 4–11)

## 2023-02-05 NOTE — PROGRESS NOTES
Labs are all good except for hemoglobin A1c still in the prediabetes range emphasizing the importance of avoiding dietary starch and sugar

## 2023-06-13 ENCOUNTER — TELEPHONE (OUTPATIENT)
Dept: FAMILY MEDICINE CLINIC | Facility: CLINIC | Age: 63
End: 2023-06-13

## 2023-07-03 RX ORDER — ATORVASTATIN CALCIUM 80 MG/1
TABLET, FILM COATED ORAL
Qty: 90 TABLET | Refills: 2 | Status: SHIPPED | OUTPATIENT
Start: 2023-07-03

## 2023-07-03 NOTE — TELEPHONE ENCOUNTER
Last refilled 4/12/22 for 90 with 4 refills. Last same day with Dr Demi Ordonez ov 6/13/23 No future appointments.

## 2023-09-25 RX ORDER — ERGOCALCIFEROL 1.25 MG/1
50000 CAPSULE ORAL WEEKLY
Qty: 12 CAPSULE | Refills: 3 | Status: SHIPPED | OUTPATIENT
Start: 2023-09-25

## 2023-09-25 NOTE — TELEPHONE ENCOUNTER
[facility] called for their medication refill. Last Office visit:  6/13/2023    Last Filled: 9/29/2022; Qty 12 w/ 3 refills     Follow up visit:  No future appointments.

## 2023-12-06 RX ORDER — EZETIMIBE 10 MG/1
10 TABLET ORAL DAILY
Qty: 100 TABLET | Refills: 3 | Status: SHIPPED | OUTPATIENT
Start: 2023-12-06

## 2023-12-06 NOTE — TELEPHONE ENCOUNTER
Last refilled 9/1/22 for 90 with 3 refills.  Last ov 6/13/23   Future Appointments   Date Time Provider [address]   1/9/2024  9:00 AM Carlitos Brownlee MD [facility] AMB

## 2024-01-08 DIAGNOSIS — Z11.4 ENCOUNTER FOR SCREENING FOR HIV: ICD-10-CM

## 2024-01-08 DIAGNOSIS — Z12.5 PROSTATE CANCER SCREENING: ICD-10-CM

## 2024-01-08 DIAGNOSIS — E55.9 VITAMIN D DEFICIENCY: ICD-10-CM

## 2024-01-08 DIAGNOSIS — I10 ESSENTIAL HYPERTENSION: Primary | ICD-10-CM

## 2024-01-08 DIAGNOSIS — R73.03 PREDIABETES: ICD-10-CM

## 2024-01-08 DIAGNOSIS — E78.2 MIXED HYPERLIPIDEMIA: ICD-10-CM

## 2024-01-08 PROBLEM — I71.21 ANEURYSM OF THE ASCENDING AORTA, WITHOUT RUPTURE (HCC): Status: ACTIVE | Noted: 2024-01-08

## 2024-01-08 PROBLEM — J43.9 PULMONARY EMPHYSEMA, UNSPECIFIED EMPHYSEMA TYPE (HCC): Status: ACTIVE | Noted: 2024-01-08

## 2024-01-08 ASSESSMENT — ENCOUNTER SYMPTOMS
COUGH: 0
NAUSEA: 0
DIARRHEA: 0
ANAL BLEEDING: 0
ABDOMINAL PAIN: 0
BLOOD IN STOOL: 0
CHEST TIGHTNESS: 0
VOMITING: 0
CONSTIPATION: 0
SORE THROAT: 0
WHEEZING: 0

## 2024-01-08 NOTE — PROGRESS NOTES
HISTORY OF PRESENT ILLNESS  Terry Escamilla  is a 63 y.o. y.o. male    He presents for follow-up with a history of hypertension, hyperlipidemia, coronary artery disease and thoracic aortic aneurysm followed by cardiology, COPD, prediabetes and vitamin D deficiency as well as for a welcome to Medicare evaluation.        Mr#: [id number]      Past Medical History:   Diagnosis Date    Aorta aneurysm (HCC)     Coronary artery disease     LAD - 4.0 x 18mm XIENCE 4/16, OM1 - 4.0 x 15mm XIENCE 4/16     Dyslipidemia     Granuloma annulare     Hypertensive cardiovascular disease     Mitral regurgitation     moderate (TTE 02/17),  moderate (MARIBEL 4/16)    Pulmonary fibrosis (HCC)     upper lobes bilat (CTA 12/16)    Seborrheic dermatitis     Tobacco abuse, in remission     quit 2012       Past Surgical History:   Procedure Laterality Date    CERVICAL FUSION      cervical fusion    ORTHOPEDIC SURGERY      prior (L) knee surgery       Family History   Problem Relation Age of Onset    Post-op Nausea/Vomiting Mother     Cancer Mother         lung cancer     Heart Disease Father     Headache Father     Stroke Neg Hx     Heart Disease Maternal Grandmother     Cancer Paternal Grandmother         throat cancer     Diabetes Neg Hx     Hypertension Maternal Grandmother        Allergies   Allergen Reactions    Ticagrelor Other (See Comments)     dyspnea    Sildenafil Other (See Comments)     Flushing    Vardenafil Other (See Comments)     Flushing       Social History     Tobacco Use   Smoking Status Former    Current packs/day: 0.00    Types: Cigarettes    Quit date: 8/10/2013    Years since quitting: 10.4   Smokeless Tobacco Never       Social History     Substance and Sexual Activity   Alcohol Use Yes    Alcohol/week: 0.0 standard drinks of alcohol       Immunization History   Administered Date(s) Administered    COVID-19, PFIZER PURPLE top, DILUTE for use, (age 12 y+), 30mcg/0.3mL 01/04/2021, 01/25/2021, 10/27/2021    Influenza Quadv

## 2024-01-09 ENCOUNTER — OFFICE VISIT (OUTPATIENT)
Dept: FAMILY MEDICINE CLINIC | Facility: CLINIC | Age: 64
End: 2024-01-09
Payer: MEDICARE

## 2024-01-09 ENCOUNTER — NURSE ONLY (OUTPATIENT)
Dept: FAMILY MEDICINE CLINIC | Facility: CLINIC | Age: 64
End: 2024-01-09

## 2024-01-09 VITALS
BODY MASS INDEX: 35.65 KG/M2 | OXYGEN SATURATION: 96 % | DIASTOLIC BLOOD PRESSURE: 84 MMHG | HEIGHT: 73 IN | TEMPERATURE: 98 F | RESPIRATION RATE: 16 BRPM | WEIGHT: 269 LBS | SYSTOLIC BLOOD PRESSURE: 136 MMHG | HEART RATE: 74 BPM

## 2024-01-09 DIAGNOSIS — Z13.6 SCREENING FOR AAA (ABDOMINAL AORTIC ANEURYSM): ICD-10-CM

## 2024-01-09 DIAGNOSIS — Z13.6 SCREENING FOR AAA (AORTIC ABDOMINAL ANEURYSM): ICD-10-CM

## 2024-01-09 DIAGNOSIS — E55.9 VITAMIN D DEFICIENCY: ICD-10-CM

## 2024-01-09 DIAGNOSIS — I71.21 ANEURYSM OF THE ASCENDING AORTA, WITHOUT RUPTURE (HCC): ICD-10-CM

## 2024-01-09 DIAGNOSIS — Z12.5 PROSTATE CANCER SCREENING: ICD-10-CM

## 2024-01-09 DIAGNOSIS — I10 ESSENTIAL HYPERTENSION: Primary | ICD-10-CM

## 2024-01-09 DIAGNOSIS — Z13.6 SCREENING FOR CARDIOVASCULAR CONDITION: ICD-10-CM

## 2024-01-09 DIAGNOSIS — R73.03 PREDIABETES: ICD-10-CM

## 2024-01-09 DIAGNOSIS — J43.9 PULMONARY EMPHYSEMA, UNSPECIFIED EMPHYSEMA TYPE (HCC): ICD-10-CM

## 2024-01-09 DIAGNOSIS — E78.2 MIXED HYPERLIPIDEMIA: ICD-10-CM

## 2024-01-09 DIAGNOSIS — Z87.891 PERSONAL HISTORY OF TOBACCO USE: ICD-10-CM

## 2024-01-09 DIAGNOSIS — I25.10 CORONARY ARTERY DISEASE INVOLVING NATIVE CORONARY ARTERY OF NATIVE HEART WITHOUT ANGINA PECTORIS: ICD-10-CM

## 2024-01-09 DIAGNOSIS — Z11.4 ENCOUNTER FOR SCREENING FOR HIV: ICD-10-CM

## 2024-01-09 DIAGNOSIS — I10 ESSENTIAL HYPERTENSION: ICD-10-CM

## 2024-01-09 DIAGNOSIS — Z00.00 WELCOME TO MEDICARE PREVENTIVE VISIT: ICD-10-CM

## 2024-01-09 PROCEDURE — G8417 CALC BMI ABV UP PARAM F/U: HCPCS | Performed by: FAMILY MEDICINE

## 2024-01-09 PROCEDURE — 90694 VACC AIIV4 NO PRSRV 0.5ML IM: CPT | Performed by: FAMILY MEDICINE

## 2024-01-09 PROCEDURE — G8484 FLU IMMUNIZE NO ADMIN: HCPCS | Performed by: FAMILY MEDICINE

## 2024-01-09 PROCEDURE — 1036F TOBACCO NON-USER: CPT | Performed by: FAMILY MEDICINE

## 2024-01-09 PROCEDURE — 3079F DIAST BP 80-89 MM HG: CPT | Performed by: FAMILY MEDICINE

## 2024-01-09 PROCEDURE — 99214 OFFICE O/P EST MOD 30 MIN: CPT | Performed by: FAMILY MEDICINE

## 2024-01-09 PROCEDURE — 3023F SPIROM DOC REV: CPT | Performed by: FAMILY MEDICINE

## 2024-01-09 PROCEDURE — 3075F SYST BP GE 130 - 139MM HG: CPT | Performed by: FAMILY MEDICINE

## 2024-01-09 PROCEDURE — 90677 PCV20 VACCINE IM: CPT | Performed by: FAMILY MEDICINE

## 2024-01-09 PROCEDURE — 1123F ACP DISCUSS/DSCN MKR DOCD: CPT | Performed by: FAMILY MEDICINE

## 2024-01-09 PROCEDURE — 3017F COLORECTAL CA SCREEN DOC REV: CPT | Performed by: FAMILY MEDICINE

## 2024-01-09 PROCEDURE — G0296 VISIT TO DETERM LDCT ELIG: HCPCS | Performed by: FAMILY MEDICINE

## 2024-01-09 PROCEDURE — G8427 DOCREV CUR MEDS BY ELIG CLIN: HCPCS | Performed by: FAMILY MEDICINE

## 2024-01-09 ASSESSMENT — PATIENT HEALTH QUESTIONNAIRE - PHQ9
SUM OF ALL RESPONSES TO PHQ QUESTIONS 1-9: 0
1. LITTLE INTEREST OR PLEASURE IN DOING THINGS: 0
2. FEELING DOWN, DEPRESSED OR HOPELESS: 0
SUM OF ALL RESPONSES TO PHQ QUESTIONS 1-9: 0
SUM OF ALL RESPONSES TO PHQ QUESTIONS 1-9: 0
SUM OF ALL RESPONSES TO PHQ9 QUESTIONS 1 & 2: 0
SUM OF ALL RESPONSES TO PHQ QUESTIONS 1-9: 0

## 2024-01-09 ASSESSMENT — ENCOUNTER SYMPTOMS: SHORTNESS OF BREATH: 1

## 2024-01-09 ASSESSMENT — LIFESTYLE VARIABLES
HOW MANY STANDARD DRINKS CONTAINING ALCOHOL DO YOU HAVE ON A TYPICAL DAY: 1 OR 2
HOW OFTEN DO YOU HAVE A DRINK CONTAINING ALCOHOL: 2-3 TIMES A WEEK

## 2024-01-09 NOTE — PROGRESS NOTES
Terry Escamilla is a 63 y.o. male (: 1960) presenting to address:    Chief Complaint   Patient presents with    Medicare AWV    Immunizations     Would like flu shot .        Vitals:    24 0848   BP: 136/84   Pulse: 74   Resp: 16   Temp: 98 °F (36.7 °C)   SpO2: 96%       Coordination of Care Questionaire:   1. \"Have you been to the ER, urgent care clinic since your last visit?  Hospitalized since your last visit?\" No    2. \"Have you seen or consulted any other health care providers outside of the [facility] since your last visit?\" No     3. For patients aged 45-75: Has the patient had a colonoscopy / FIT/ Cologuard? Yes - no Care Gap present      If the patient is female:    4. For patients aged 40-74: Has the patient had a mammogram within the past 2 years? NA - based on age or sex      5. For patients aged 21-65: Has the patient had a pap smear? NA - based on age or sex    Advanced Directive:   1. Do you have an Advanced Directive? Yes    2. Would you like information on Advanced Directives? No  Flu shot , PCV 20 Immunization/s administered 2024 by Christina Duenas LPN   Patient tolerated procedure well.  No reactions noted.

## 2024-01-09 NOTE — PATIENT INSTRUCTIONS
information.      Personalized Preventive Plan for Terry Escamilla - 1/9/2024  Medicare offers a range of preventive health benefits. Some of the tests and screenings are paid in full while other may be subject to a deductible, co-insurance, and/or copay.    Some of these benefits include a comprehensive review of your medical history including lifestyle, illnesses that may run in your family, and various assessments and screenings as appropriate.    After reviewing your medical record and screening and assessments performed today your provider may have ordered immunizations, labs, imaging, and/or referrals for you.  A list of these orders (if applicable) as well as your Preventive Care list are included within your After Visit Summary for your review.    Other Preventive Recommendations:    A preventive eye exam performed by an eye specialist is recommended every 1-2 years to screen for glaucoma; cataracts, macular degeneration, and other eye disorders.  A preventive dental visit is recommended every 6 months.  Try to get at least 150 minutes of exercise per week or 10,000 steps per day on a pedometer .  Order or download the FREE \"Exercise & Physical Activity: Your Everyday Guide\" from [employer]. Call 1-588.724.9055 or search [employer] online.  You need 3505-3419 mg of calcium and 4828-5438 IU of vitamin D per day. It is possible to meet your calcium requirement with diet alone, but a vitamin D supplement is usually necessary to meet this goal.  When exposed to the sun, use a sunscreen that protects against both UVA and UVB radiation with an SPF of 30 or greater. Reapply every 2 to 3 hours or after sweating, drying off with a towel, or swimming.  Always wear a seat belt when traveling in a car. Always wear a helmet when riding a bicycle or motorcycle.

## 2024-01-10 LAB
25(OH)D3+25(OH)D2 SERPL-MCNC: 46.5 NG/ML (ref 30–100)
ALBUMIN SERPL-MCNC: 4.4 G/DL (ref 3.9–4.9)
ALBUMIN/GLOB SERPL: 1.4 {RATIO} (ref 1.2–2.2)
ALP SERPL-CCNC: 88 IU/L (ref 44–121)
ALT SERPL-CCNC: 21 IU/L (ref 0–44)
APPEARANCE UR: ABNORMAL
AST SERPL-CCNC: 19 IU/L (ref 0–40)
BASOPHILS # BLD AUTO: 0.1 X10E3/UL (ref 0–0.2)
BASOPHILS NFR BLD AUTO: 1 %
BILIRUB SERPL-MCNC: 0.7 MG/DL (ref 0–1.2)
BILIRUB UR QL STRIP: NEGATIVE
BUN SERPL-MCNC: 19 MG/DL (ref 8–27)
BUN/CREAT SERPL: 26 (ref 10–24)
CALCIUM SERPL-MCNC: 9.9 MG/DL (ref 8.6–10.2)
CHLORIDE SERPL-SCNC: 103 MMOL/L (ref 96–106)
CHOLEST SERPL-MCNC: 127 MG/DL (ref 100–199)
CO2 SERPL-SCNC: 23 MMOL/L (ref 20–29)
COLOR UR: YELLOW
CREAT SERPL-MCNC: 0.74 MG/DL (ref 0.76–1.27)
EGFRCR SERPLBLD CKD-EPI 2021: 101 ML/MIN/1.73
EOSINOPHIL # BLD AUTO: 0.1 X10E3/UL (ref 0–0.4)
EOSINOPHIL NFR BLD AUTO: 2 %
ERYTHROCYTE [DISTWIDTH] IN BLOOD BY AUTOMATED COUNT: 12.5 % (ref 11.6–15.4)
GLOBULIN SER CALC-MCNC: 3.2 G/DL (ref 1.5–4.5)
GLUCOSE SERPL-MCNC: 112 MG/DL (ref 70–99)
GLUCOSE UR QL STRIP: NEGATIVE
HBA1C MFR BLD: 5.9 % (ref 4.8–5.6)
HCT VFR BLD AUTO: 42.5 % (ref 37.5–51)
HDLC SERPL-MCNC: 47 MG/DL
HGB BLD-MCNC: 15.2 G/DL (ref 13–17.7)
HGB UR QL STRIP: NEGATIVE
HIV 1+2 AB+HIV1 P24 AG SERPL QL IA: NON REACTIVE
IMM GRANULOCYTES # BLD AUTO: 0 X10E3/UL (ref 0–0.1)
IMM GRANULOCYTES NFR BLD AUTO: 0 %
KETONES UR QL STRIP: NEGATIVE
LDLC SERPL CALC-MCNC: 60 MG/DL (ref 0–99)
LEUKOCYTE ESTERASE UR QL STRIP: NEGATIVE
LYMPHOCYTES # BLD AUTO: 2.2 X10E3/UL (ref 0.7–3.1)
LYMPHOCYTES NFR BLD AUTO: 36 %
MCH RBC QN AUTO: 32 PG (ref 26.6–33)
MCHC RBC AUTO-ENTMCNC: 35.8 G/DL (ref 31.5–35.7)
MCV RBC AUTO: 90 FL (ref 79–97)
MICRO URNS: ABNORMAL
MONOCYTES # BLD AUTO: 0.6 X10E3/UL (ref 0.1–0.9)
MONOCYTES NFR BLD AUTO: 9 %
NEUTROPHILS # BLD AUTO: 3.2 X10E3/UL (ref 1.4–7)
NEUTROPHILS NFR BLD AUTO: 52 %
NITRITE UR QL STRIP: NEGATIVE
PH UR STRIP: 5.5 [PH] (ref 5–7.5)
PLATELET # BLD AUTO: 164 X10E3/UL (ref 150–450)
POTASSIUM SERPL-SCNC: 4.6 MMOL/L (ref 3.5–5.2)
PROT SERPL-MCNC: 7.6 G/DL (ref 6–8.5)
PROT UR QL STRIP: NEGATIVE
PSA SERPL-MCNC: 0.6 NG/ML (ref 0–4)
RBC # BLD AUTO: 4.75 X10E6/UL (ref 4.14–5.8)
SODIUM SERPL-SCNC: 142 MMOL/L (ref 134–144)
SP GR UR STRIP: 1.03 (ref 1–1.03)
SPECIMEN STATUS REPORT: NORMAL
TRIGL SERPL-MCNC: 109 MG/DL (ref 0–149)
TSH SERPL DL<=0.005 MIU/L-ACNC: 1.78 UIU/ML (ref 0.45–4.5)
UROBILINOGEN UR STRIP-MCNC: 1 MG/DL (ref 0.2–1)
VLDLC SERPL CALC-MCNC: 20 MG/DL (ref 5–40)
WBC # BLD AUTO: 6.2 X10E3/UL (ref 3.4–10.8)

## 2024-01-11 ENCOUNTER — TELEPHONE (OUTPATIENT)
Dept: FAMILY MEDICINE CLINIC | Facility: CLINIC | Age: 64
End: 2024-01-11

## 2024-01-11 NOTE — TELEPHONE ENCOUNTER
Cristina called on behalf of the pt stating that the diagnosis code used for the Screening for AAA (abdominal aortic  is not covered with the pt's insurance. Cristina wanted to know if the order could be resubmitted with another code. Please advise

## 2024-01-18 ENCOUNTER — HOSPITAL ENCOUNTER (OUTPATIENT)
Facility: HOSPITAL | Age: 64
Discharge: HOME | End: 2024-01-18
Attending: FAMILY MEDICINE
Payer: MEDICARE

## 2024-01-18 DIAGNOSIS — I71.21 ANEURYSM OF THE ASCENDING AORTA, WITHOUT RUPTURE (HCC): ICD-10-CM

## 2024-01-18 PROCEDURE — 6360000004 HC RX CONTRAST MEDICATION: Performed by: FAMILY MEDICINE

## 2024-01-18 PROCEDURE — 71275 CT ANGIOGRAPHY CHEST: CPT

## 2024-01-18 RX ADMIN — IOPAMIDOL 100 ML: 755 INJECTION, SOLUTION INTRAVENOUS at 11:35

## 2024-01-26 RX ORDER — LOSARTAN POTASSIUM 100 MG/1
TABLET ORAL
Qty: 90 TABLET | Refills: 3 | Status: SHIPPED | OUTPATIENT
Start: 2024-01-26

## 2024-01-26 NOTE — TELEPHONE ENCOUNTER
Last ov 2/3/23 for 90 with 3 refills. Last ov 1/9/24   Future Appointments   Date Time Provider Department Center   1/30/2024 11:00 AM Kam Soto MD [facility] AMB   7/9/2024  9:00 AM Jarrett Meade MD [facility] AMB

## 2024-01-30 ENCOUNTER — OFFICE VISIT (OUTPATIENT)
Age: 64
End: 2024-01-30
Payer: MEDICARE

## 2024-01-30 VITALS
DIASTOLIC BLOOD PRESSURE: 80 MMHG | HEART RATE: 79 BPM | SYSTOLIC BLOOD PRESSURE: 137 MMHG | OXYGEN SATURATION: 99 % | BODY MASS INDEX: 35.49 KG/M2 | WEIGHT: 269 LBS

## 2024-01-30 DIAGNOSIS — I25.10 CORONARY ARTERY DISEASE INVOLVING NATIVE CORONARY ARTERY OF NATIVE HEART WITHOUT ANGINA PECTORIS: Primary | ICD-10-CM

## 2024-01-30 DIAGNOSIS — I34.0 MITRAL VALVE INSUFFICIENCY, UNSPECIFIED ETIOLOGY: ICD-10-CM

## 2024-01-30 DIAGNOSIS — I71.21 ANEURYSM OF THE ASCENDING AORTA, WITHOUT RUPTURE (HCC): ICD-10-CM

## 2024-01-30 PROCEDURE — 93000 ELECTROCARDIOGRAM COMPLETE: CPT | Performed by: INTERNAL MEDICINE

## 2024-01-30 PROCEDURE — G8417 CALC BMI ABV UP PARAM F/U: HCPCS | Performed by: INTERNAL MEDICINE

## 2024-01-30 PROCEDURE — 99204 OFFICE O/P NEW MOD 45 MIN: CPT | Performed by: INTERNAL MEDICINE

## 2024-01-30 PROCEDURE — 1036F TOBACCO NON-USER: CPT | Performed by: INTERNAL MEDICINE

## 2024-01-30 PROCEDURE — 3075F SYST BP GE 130 - 139MM HG: CPT | Performed by: INTERNAL MEDICINE

## 2024-01-30 PROCEDURE — G8484 FLU IMMUNIZE NO ADMIN: HCPCS | Performed by: INTERNAL MEDICINE

## 2024-01-30 PROCEDURE — G8428 CUR MEDS NOT DOCUMENT: HCPCS | Performed by: INTERNAL MEDICINE

## 2024-01-30 PROCEDURE — 1123F ACP DISCUSS/DSCN MKR DOCD: CPT | Performed by: INTERNAL MEDICINE

## 2024-01-30 PROCEDURE — 3017F COLORECTAL CA SCREEN DOC REV: CPT | Performed by: INTERNAL MEDICINE

## 2024-01-30 PROCEDURE — 3079F DIAST BP 80-89 MM HG: CPT | Performed by: INTERNAL MEDICINE

## 2024-01-30 RX ORDER — ASPIRIN 325 MG
325 TABLET ORAL DAILY
COMMUNITY

## 2024-01-30 NOTE — PROGRESS NOTES
Identified pt with two pt identifiers(name and ). Reviewed record in preparation for visit and have obtained necessary documentation.    Terry Escamilla presents today for   Chief Complaint   Patient presents with    New Patient       Pt c/o SOB, CHEST PAIN/ PRESSURE, FATIGUE.          Terry Escamilla preferred language for health care discussion is english/other.    Personal Protective Equipment:   Personal Protective Equipment was used including: mask-surgical and hands-gloves. Patient was placed on no precaution(s). Patient was not masked.    Precautions:   Patient currently on None  Patient currently roomed with door closed.    Is someone accompanying this pt? no    Is the patient using any DME equipment during OV? no    Depression Screenin/9/2024     8:49 AM 2/3/2023     1:49 PM   PHQ-9 Questionaire   Little interest or pleasure in doing things 0 0   Feeling down, depressed, or hopeless 0 0   PHQ-9 Total Score 0 0        Learning Assessment:  No question data found.    Abuse Screenin/30/2024    11:00 AM   AMB Abuse Screening   Do you ever feel afraid of your partner? N   Are you in a relationship with someone who physically or mentally threatens you? N   Is it safe for you to go home? Y          Fall Risk      2024    11:05 AM 2024     8:49 AM   Fall Risk   2 or more falls in past year? no no   Fall with injury in past year? no no         Pt currently taking Anticoagulant therapy? no  Pt currently taking Antiplatelet therapy? no    Coordination of Care:  1. Have you been to the ER, urgent care clinic since your last visit? Hospitalized since your last visit? no    2. Have you seen or consulted any other health care providers outside of the [facility] since your last visit? Include any pap smears or colon screening. no      Please see Red banners under Allergies and Med Rec to remove outside inquires. All correct information has been verified with patient and added to

## 2024-01-30 NOTE — PROGRESS NOTES
[facility]    Terry Escamilla is 63 y.o. male     Patient is here today for cardiac evaluation  Patient has known history of CAD s/p ostial/proximal LAD 4.0 x 18 mm PAM in 04/2016 along with proximal LCx/OM 4.0 x 15 mm PAM in 04/2016  Patient also has mitral regurgitation which was moderate by echo in 2016   Patient with aortic aneurysm with dilation up to 4.2 cm by CAT scan in 2016    Patient is here today for cardiac evaluation  Patient is retired now.  He says that since then, he has been trying to focus on his health.  He has been experiencing some chest discomfort however not reminiscent of his previous angina.  Happens sometimes at rest sometimes with exertion.  No nausea vomiting diaphoresis.  He also has mild dyspnea.  He also has DJD.  No presyncope syncope recently had a CAT scan.  Denies any nausea, vomiting, abdominal pain, fever, chills, sputum production. No hematuria or other bleeding complaints    Past Medical History:   Diagnosis Date    Aorta aneurysm (HCC)     Coronary artery disease     LAD - 4.0 x 18mm XIENCE 4/16, OM1 - 4.0 x 15mm XIENCE 4/16     Dyslipidemia     Granuloma annulare     Hypertension     Hypertensive cardiovascular disease     Mitral regurgitation     moderate (TTE 02/17),  moderate (MARIBEL 4/16)    Obesity     Pulmonary fibrosis (HCC)     upper lobes bilat (CTA 12/16)    Seborrheic dermatitis     Tobacco abuse, in remission     quit 2012       Review of Systems:  Cardiac symptoms as noted above in HPI. All others negative.    Current Outpatient Medications   Medication Sig    aspirin 325 MG tablet Take 1 tablet by mouth daily    losartan (COZAAR) 100 MG tablet TAKE 1 TABLET BY MOUTH ONCE DAILY    ezetimibe (ZETIA) 10 MG tablet Take 1 tablet by mouth daily    vitamin D (ERGOCALCIFEROL) 1.25 MG (17728 UT) CAPS capsule TAKE 1 CAPSULE BY MOUTH EVERY WEEK    atorvastatin (LIPITOR) 80 MG tablet take 1 tablet by mouth once

## 2024-01-30 NOTE — PATIENT INSTRUCTIONS
Testing   Echo/   Nuclear Stress  Nuclear Stress Instructions-Nothing to eat or drink past midnight and no medicaitons the morning of cardiac testing.  **call office 3-5 days after testing is completed for results** Please ensure testing is completed prior to scheduled follow up appointment. If it is not completed your appointment may be rescheduled**

## 2024-02-26 ENCOUNTER — TELEPHONE (OUTPATIENT)
Age: 64
End: 2024-02-26

## 2024-02-26 NOTE — TELEPHONE ENCOUNTER
----- Message from Felicia Griggs CMA sent at 2/20/2024  9:40 AM EST -----    ----- Message -----  From: Kam Soto MD  Sent: 2/15/2024   8:15 AM EST  To: Felicia Griggs CMA    Please inform patient regarding abnormal test findings.   Aorta: Dilated aortic root. Ao root diameter is 4.3 cm. Dilated ascending aorta. Ao ascending diameter is 3.9 cm.    Thank you  SP

## 2024-02-26 NOTE — TELEPHONE ENCOUNTER
Contacted pt at  number.Two patient Identifiers confirmed. Advised pt per Dr Soto. Pt verbalized understanding and scheduled for Thursday with PA.

## 2024-02-28 NOTE — PROGRESS NOTES
[facility]    Terry Escamilla is a 63 y.o. male who presents to the office today for cardiac evaluation. Patient reports shortness of breath with activity with associated mild chest discomfort.  He reports extreme fatigue after washing his truck for 7 hours the other day.  He reports intermittent shortness of breath, as well as some global chest tightness which improved on its own.  He feels that the chest tightness may be more prominent in the spring and fall and possibly related to his allergies.  He feels that these symptoms are similar to his prior anginal complaints for his previous stent. He has not had to take any sublingual nitro.  Otherwise he feels well, denies nausea, vomiting, diaphoresis, palpitations, dizziness, near-syncope or syncope    Past Medical History:   Diagnosis Date    Aorta aneurysm (HCC)     Coronary artery disease     LAD - 4.0 x 18mm XIENCE 4/16, OM1 - 4.0 x 15mm XIENCE 4/16     Dyslipidemia     Granuloma annulare     Hypertension     Hypertensive cardiovascular disease     Mitral regurgitation     moderate (TTE 02/17),  moderate (MARIBEL 4/16)    Obesity     Pulmonary fibrosis (HCC)     upper lobes bilat (CTA 12/16)    Seborrheic dermatitis     Tobacco abuse, in remission     quit 2012     Past Surgical History:   Procedure Laterality Date    CERVICAL FUSION      cervical fusion    ORTHOPEDIC SURGERY      prior (L) knee surgery     Current Outpatient Medications   Medication Sig    aspirin 325 MG tablet Take 1 tablet by mouth daily    losartan (COZAAR) 100 MG tablet TAKE 1 TABLET BY MOUTH ONCE DAILY    ezetimibe (ZETIA) 10 MG tablet Take 1 tablet by mouth daily    vitamin D (ERGOCALCIFEROL) 1.25 MG (07153 UT) CAPS capsule TAKE 1 CAPSULE BY MOUTH EVERY WEEK    atorvastatin (LIPITOR) 80 MG tablet take 1 tablet by mouth once daily    metoprolol succinate (TOPROL XL) 25 MG extended release tablet take 1 tablet by mouth twice a day    nitroGLYCERIN (NITROSTAT) 0.4 MG SL tablet Place 1

## 2024-02-29 ENCOUNTER — OFFICE VISIT (OUTPATIENT)
Age: 64
End: 2024-02-29
Payer: MEDICARE

## 2024-02-29 VITALS
HEART RATE: 75 BPM | OXYGEN SATURATION: 97 % | SYSTOLIC BLOOD PRESSURE: 110 MMHG | BODY MASS INDEX: 36.73 KG/M2 | WEIGHT: 278.4 LBS | DIASTOLIC BLOOD PRESSURE: 72 MMHG

## 2024-02-29 DIAGNOSIS — I34.0 MITRAL VALVE INSUFFICIENCY, UNSPECIFIED ETIOLOGY: ICD-10-CM

## 2024-02-29 DIAGNOSIS — I71.21 ANEURYSM OF THE ASCENDING AORTA, WITHOUT RUPTURE (HCC): ICD-10-CM

## 2024-02-29 DIAGNOSIS — I50.22 HEART FAILURE WITH MILDLY REDUCED EJECTION FRACTION (HFMREF) (HCC): ICD-10-CM

## 2024-02-29 DIAGNOSIS — I10 ESSENTIAL HYPERTENSION: ICD-10-CM

## 2024-02-29 DIAGNOSIS — E66.01 SEVERE OBESITY (BMI 35.0-39.9) WITH COMORBIDITY (HCC): ICD-10-CM

## 2024-02-29 DIAGNOSIS — R06.09 DYSPNEA ON EXERTION: ICD-10-CM

## 2024-02-29 DIAGNOSIS — E78.5 HYPERLIPIDEMIA, UNSPECIFIED HYPERLIPIDEMIA TYPE: ICD-10-CM

## 2024-02-29 DIAGNOSIS — I25.10 CORONARY ARTERY DISEASE INVOLVING NATIVE CORONARY ARTERY OF NATIVE HEART WITHOUT ANGINA PECTORIS: Primary | ICD-10-CM

## 2024-02-29 PROCEDURE — 99214 OFFICE O/P EST MOD 30 MIN: CPT | Performed by: PHYSICIAN ASSISTANT

## 2024-02-29 PROCEDURE — G8417 CALC BMI ABV UP PARAM F/U: HCPCS | Performed by: PHYSICIAN ASSISTANT

## 2024-02-29 PROCEDURE — G8484 FLU IMMUNIZE NO ADMIN: HCPCS | Performed by: PHYSICIAN ASSISTANT

## 2024-02-29 PROCEDURE — 3017F COLORECTAL CA SCREEN DOC REV: CPT | Performed by: PHYSICIAN ASSISTANT

## 2024-02-29 PROCEDURE — 3078F DIAST BP <80 MM HG: CPT | Performed by: PHYSICIAN ASSISTANT

## 2024-02-29 PROCEDURE — 1123F ACP DISCUSS/DSCN MKR DOCD: CPT | Performed by: PHYSICIAN ASSISTANT

## 2024-02-29 PROCEDURE — 1036F TOBACCO NON-USER: CPT | Performed by: PHYSICIAN ASSISTANT

## 2024-02-29 PROCEDURE — G8428 CUR MEDS NOT DOCUMENT: HCPCS | Performed by: PHYSICIAN ASSISTANT

## 2024-02-29 PROCEDURE — 3074F SYST BP LT 130 MM HG: CPT | Performed by: PHYSICIAN ASSISTANT

## 2024-02-29 NOTE — PROGRESS NOTES
Identified pt with two pt identifiers(name and ). Reviewed record in preparation for visit and have obtained necessary documentation.    Terry Escamilla presents today for   Chief Complaint   Patient presents with    Follow-up     Echo Abnormal.       Pt c/o DIZZINESS, SOB, CHEST PAIN/ PRESSURE, FATIGUE/WEAKNESS, HEADACHES, SWELLING.         Washed his truck for two days.    Terry Escamilla preferred language for health care discussion is english/other.    Personal Protective Equipment:   Personal Protective Equipment was used including: mask-surgical and hands-gloves. Patient was placed on no precaution(s). Patient was masked.    Precautions:   Patient currently on None  Patient currently roomed with door closed.    Is someone accompanying this pt? no    Is the patient using any DME equipment during OV? no    Depression Screenin/9/2024     8:49 AM 2/3/2023     1:49 PM   PHQ-9 Questionaire   Little interest or pleasure in doing things 0 0   Feeling down, depressed, or hopeless 0 0   PHQ-9 Total Score 0 0        Learning Assessment:  No question data found.    Abuse Screenin/30/2024    11:00 AM   AMB Abuse Screening   Do you ever feel afraid of your partner? N   Are you in a relationship with someone who physically or mentally threatens you? N   Is it safe for you to go home? Y          Fall Risk      2024    11:05 AM 2024     8:49 AM   Fall Risk   2 or more falls in past year? no no   Fall with injury in past year? no no         Pt currently taking Anticoagulant therapy? no  Pt currently taking Antiplatelet therapy?yes    Coordination of Care:  1. Have you been to the ER, urgent care clinic since your last visit? Hospitalized since your last visit? no    2. Have you seen or consulted any other health care providers outside of the [facility] since your last visit? Include any pap smears or colon screening. no      Please see Red banners under Allergies and Med Rec to remove

## 2024-04-08 RX ORDER — ATORVASTATIN CALCIUM 80 MG/1
80 TABLET, FILM COATED ORAL DAILY
Qty: 90 TABLET | Refills: 3 | Status: SHIPPED | OUTPATIENT
Start: 2024-04-08

## 2024-04-08 NOTE — TELEPHONE ENCOUNTER
Last refilled 7/3/23 for 90 with 2 refills. Last ov 1/9/24   Future Appointments   Date Time Provider Department Center   4/11/2024 10:00 AM Diann Lopez PA-C [facility] BS AMB   7/9/2024  9:00 AM Jarrett Meade MD [facility] BS AMB

## 2024-04-11 ENCOUNTER — OFFICE VISIT (OUTPATIENT)
Age: 64
End: 2024-04-11
Payer: MEDICARE

## 2024-04-11 VITALS
RESPIRATION RATE: 16 BRPM | HEIGHT: 73 IN | WEIGHT: 259.2 LBS | HEART RATE: 78 BPM | DIASTOLIC BLOOD PRESSURE: 84 MMHG | OXYGEN SATURATION: 96 % | SYSTOLIC BLOOD PRESSURE: 120 MMHG | BODY MASS INDEX: 34.35 KG/M2

## 2024-04-11 DIAGNOSIS — I25.10 CORONARY ARTERY DISEASE INVOLVING NATIVE CORONARY ARTERY OF NATIVE HEART WITHOUT ANGINA PECTORIS: Primary | ICD-10-CM

## 2024-04-11 DIAGNOSIS — I71.9 AORTIC ANEURYSM WITHOUT RUPTURE, UNSPECIFIED PORTION OF AORTA (HCC): ICD-10-CM

## 2024-04-11 DIAGNOSIS — R06.09 DYSPNEA ON EXERTION: ICD-10-CM

## 2024-04-11 DIAGNOSIS — Z87.09 HX OF PULMONARY FIBROSIS: ICD-10-CM

## 2024-04-11 DIAGNOSIS — R06.02 SOB (SHORTNESS OF BREATH): ICD-10-CM

## 2024-04-11 PROCEDURE — 3074F SYST BP LT 130 MM HG: CPT | Performed by: PHYSICIAN ASSISTANT

## 2024-04-11 PROCEDURE — 3017F COLORECTAL CA SCREEN DOC REV: CPT | Performed by: PHYSICIAN ASSISTANT

## 2024-04-11 PROCEDURE — G8417 CALC BMI ABV UP PARAM F/U: HCPCS | Performed by: PHYSICIAN ASSISTANT

## 2024-04-11 PROCEDURE — 1036F TOBACCO NON-USER: CPT | Performed by: PHYSICIAN ASSISTANT

## 2024-04-11 PROCEDURE — 3079F DIAST BP 80-89 MM HG: CPT | Performed by: PHYSICIAN ASSISTANT

## 2024-04-11 PROCEDURE — 99214 OFFICE O/P EST MOD 30 MIN: CPT | Performed by: PHYSICIAN ASSISTANT

## 2024-04-11 PROCEDURE — G8427 DOCREV CUR MEDS BY ELIG CLIN: HCPCS | Performed by: PHYSICIAN ASSISTANT

## 2024-04-11 PROCEDURE — 1123F ACP DISCUSS/DSCN MKR DOCD: CPT | Performed by: PHYSICIAN ASSISTANT

## 2024-04-11 NOTE — PROGRESS NOTES
Identified pt with two pt identifiers(name and ). Reviewed record in preparation for visit and have obtained necessary documentation.    Terry Escamilla presents today for   Chief Complaint   Patient presents with    Follow-up     6 month Post Echo and NST.       Pt c/o DIZZINESS, SOB, CHEST PAIN/ PRESSURE, FATIGUE/WEAKNESS, HEADACHES, SWELLING.             Terry Escamilla preferred language for health care discussion is english/other.    Personal Protective Equipment:   Personal Protective Equipment was used including: mask-surgical and hands-gloves. Patient was placed on no precaution(s). Patient was masked.    Precautions:   Patient currently on None  Patient currently roomed with door closed.    Is someone accompanying this pt? Alone    Is the patient using any DME equipment during OV? N/A    Depression Screenin/9/2024     8:49 AM 2/3/2023     1:49 PM   PHQ-9 Questionaire   Little interest or pleasure in doing things 0 0   Feeling down, depressed, or hopeless 0 0   PHQ-9 Total Score 0 0        Learning Assessment:  No question data found.    Abuse Screenin/30/2024    11:00 AM   AMB Abuse Screening   Do you ever feel afraid of your partner? N   Are you in a relationship with someone who physically or mentally threatens you? N   Is it safe for you to go home? Y          Fall Risk      2024    11:05 AM 2024     8:49 AM   Fall Risk   2 or more falls in past year? no no   Fall with injury in past year? no no         Pt currently taking Anticoagulant /Antiplatelet therapy? Aspirin    Coordination of Care:  1. Have you been to the ER, urgent care clinic since your last visit? Hospitalized since your last visit? No    2. Have you seen or consulted any other health care providers outside of the [facility] since your last visit? Include any pap smears or colon screening. No      Please see Red banners under Allergies and Med Rec to remove outside inquires. All correct information 
showed stable ascending aneurysm at 4.1 cm.  CTA chest in 2020 showed stable aneurysm at 4.2 cm  CTA 01/2024 Sinus of Valsalva: 4.0 x 3.8 x 4.2 cm Maximum diameter of the ascending aorta: 3.7 x 4.0 cm. Continue serial monitoring.   Continue Toprol XL 25 mg daily and losartan 100 mg daily.    Goal blood pressure less than 120/80.      Mitral regurgitation:    TTE and MARIBEL in 2016, with moderate mitral regurgitation, likely from prolapse of the posterior leaflet.    TTE with mild - moderate MR in 05/18  TTE Moderate MR July 2020  TTE  02/2024, Mild MR.      HFmrEF Mild LV dysfunction with improved LVEF.   TTE 07/2020, LVEF 45-50%.   TTE 01/2024, LVEF 55-60%.   Continue Toprol XL and Losartan.      Hyperlipidemia: Goal LDL should be less than 80, continue atorvastatin and Zetia, managed by PCP.      HTN: /81 mmHg  Goal blood pressure <120/80 mmHg.   Continue Toprol XL 25 mg daily and losartan 100 mg daily.    Salt restriction, exercise and weight loss advised     Obesity:BMI 34.20 kg/m2  Weight loss advised  Importance of diet and exercise was discussed with patient.    COPD/pulmonary fibrosis, patient requesting pulmonary referral       Patient to follow up with Dr. Soto in 6 months or sooner if needed.   This plan was discussed with patient who is in agreement.  Thank you for allowing me to participate in patient care. Please feel free to call me if you have any question or concern.       Diann Lopez PA-C  [facility]     Please note: This document has been produced using voice recognition software. Unrecognized errors in transcription may be present.

## 2024-06-27 RX ORDER — METOPROLOL SUCCINATE 25 MG/1
25 TABLET, EXTENDED RELEASE ORAL 2 TIMES DAILY
Qty: 60 TABLET | Refills: 0 | Status: SHIPPED | OUTPATIENT
Start: 2024-06-27

## 2024-06-27 NOTE — TELEPHONE ENCOUNTER
Pt called in stating he was having an issue with submitting a refill request on [employer]. He needs a refill for metoprolol 25 MG. Pharmacy is still the [facility] on [address] and phone # is correct.

## 2024-06-27 NOTE — TELEPHONE ENCOUNTER
It is not clear why he is taking metoprolol succinate (Toprol-XL 25) twice a day.  This is usually a once daily medication and his most recent cardiology note from April indicates Toprol XL 25 1 times daily.  A refill for enough to take twice daily until his follow-up in July has been provided

## 2024-06-27 NOTE — TELEPHONE ENCOUNTER
Last refilled 2/3/23 for 180 with 3 refills. Last ov 1/9/24   Future Appointments   Date Time Provider Department Center   7/9/2024  9:00 AM Jarrett Meade MD [facility] BS AMB   10/22/2024 10:15 AM Kam Soto MD [facility] BS AMB

## 2024-07-07 SDOH — ECONOMIC STABILITY: HOUSING INSECURITY
IN THE LAST 12 MONTHS, WAS THERE A TIME WHEN YOU DID NOT HAVE A STEADY PLACE TO SLEEP OR SLEPT IN A SHELTER (INCLUDING NOW)?: NO

## 2024-07-07 SDOH — ECONOMIC STABILITY: INCOME INSECURITY: HOW HARD IS IT FOR YOU TO PAY FOR THE VERY BASICS LIKE FOOD, HOUSING, MEDICAL CARE, AND HEATING?: NOT HARD AT ALL

## 2024-07-07 SDOH — ECONOMIC STABILITY: TRANSPORTATION INSECURITY
IN THE PAST 12 MONTHS, HAS LACK OF TRANSPORTATION KEPT YOU FROM MEETINGS, WORK, OR FROM GETTING THINGS NEEDED FOR DAILY LIVING?: NO

## 2024-07-07 SDOH — ECONOMIC STABILITY: FOOD INSECURITY: WITHIN THE PAST 12 MONTHS, THE FOOD YOU BOUGHT JUST DIDN'T LAST AND YOU DIDN'T HAVE MONEY TO GET MORE.: NEVER TRUE

## 2024-07-07 SDOH — ECONOMIC STABILITY: FOOD INSECURITY: WITHIN THE PAST 12 MONTHS, YOU WORRIED THAT YOUR FOOD WOULD RUN OUT BEFORE YOU GOT MONEY TO BUY MORE.: NEVER TRUE

## 2024-07-09 ENCOUNTER — OFFICE VISIT (OUTPATIENT)
Dept: FAMILY MEDICINE CLINIC | Facility: CLINIC | Age: 64
End: 2024-07-09
Payer: MEDICARE

## 2024-07-09 VITALS
BODY MASS INDEX: 36.31 KG/M2 | TEMPERATURE: 98.5 F | OXYGEN SATURATION: 96 % | RESPIRATION RATE: 18 BRPM | SYSTOLIC BLOOD PRESSURE: 130 MMHG | HEART RATE: 76 BPM | HEIGHT: 73 IN | DIASTOLIC BLOOD PRESSURE: 70 MMHG | WEIGHT: 274 LBS

## 2024-07-09 DIAGNOSIS — J43.9 PULMONARY EMPHYSEMA, UNSPECIFIED EMPHYSEMA TYPE (HCC): ICD-10-CM

## 2024-07-09 DIAGNOSIS — E78.2 MIXED HYPERLIPIDEMIA: ICD-10-CM

## 2024-07-09 DIAGNOSIS — Z12.5 PROSTATE CANCER SCREENING: ICD-10-CM

## 2024-07-09 DIAGNOSIS — R73.03 PREDIABETES: ICD-10-CM

## 2024-07-09 DIAGNOSIS — G89.29 BILATERAL CHRONIC KNEE PAIN: ICD-10-CM

## 2024-07-09 DIAGNOSIS — Z12.11 COLON CANCER SCREENING: ICD-10-CM

## 2024-07-09 DIAGNOSIS — E55.9 VITAMIN D DEFICIENCY: ICD-10-CM

## 2024-07-09 DIAGNOSIS — I10 ESSENTIAL HYPERTENSION: Primary | ICD-10-CM

## 2024-07-09 DIAGNOSIS — M25.561 BILATERAL CHRONIC KNEE PAIN: ICD-10-CM

## 2024-07-09 DIAGNOSIS — I25.10 CORONARY ARTERY DISEASE INVOLVING NATIVE CORONARY ARTERY OF NATIVE HEART WITHOUT ANGINA PECTORIS: ICD-10-CM

## 2024-07-09 DIAGNOSIS — I71.21 ANEURYSM OF THE ASCENDING AORTA, WITHOUT RUPTURE (HCC): ICD-10-CM

## 2024-07-09 DIAGNOSIS — M25.562 BILATERAL CHRONIC KNEE PAIN: ICD-10-CM

## 2024-07-09 DIAGNOSIS — E66.9 OBESITY (BMI 30.0-34.9): ICD-10-CM

## 2024-07-09 PROCEDURE — 99214 OFFICE O/P EST MOD 30 MIN: CPT | Performed by: FAMILY MEDICINE

## 2024-07-09 PROCEDURE — 3017F COLORECTAL CA SCREEN DOC REV: CPT | Performed by: FAMILY MEDICINE

## 2024-07-09 PROCEDURE — 3023F SPIROM DOC REV: CPT | Performed by: FAMILY MEDICINE

## 2024-07-09 PROCEDURE — 1123F ACP DISCUSS/DSCN MKR DOCD: CPT | Performed by: FAMILY MEDICINE

## 2024-07-09 PROCEDURE — G8417 CALC BMI ABV UP PARAM F/U: HCPCS | Performed by: FAMILY MEDICINE

## 2024-07-09 PROCEDURE — 1036F TOBACCO NON-USER: CPT | Performed by: FAMILY MEDICINE

## 2024-07-09 PROCEDURE — 3078F DIAST BP <80 MM HG: CPT | Performed by: FAMILY MEDICINE

## 2024-07-09 PROCEDURE — 3075F SYST BP GE 130 - 139MM HG: CPT | Performed by: FAMILY MEDICINE

## 2024-07-09 PROCEDURE — G8427 DOCREV CUR MEDS BY ELIG CLIN: HCPCS | Performed by: FAMILY MEDICINE

## 2024-07-09 ASSESSMENT — ENCOUNTER SYMPTOMS: SHORTNESS OF BREATH: 1

## 2024-07-09 NOTE — PATIENT INSTRUCTIONS
Current Status:  Hypertension well-controlled  Prediabetes stable with a hemoglobin A1c of 5.9% 6 months ago  Satisfactory lipid levels at annual check 6 months ago  Denies symptoms of cardiac ischemia, no reported issues based on review of note from cardiology from 2/29/2024  Follow-up CT-CTA for status of ascending aortic aneurysm-no change 1/2024, follow up in October  Satisfactory vitamin D level at annual check 6 months ago.  Obesity worsening with significant weight gain    Health Maintenance Recommendations:  Abdominal aortic aneurysm screening ultrasound (CT scan abdomen pelvis with and without contrast was completed in February 2022 but without reference to the abdominal aortic and the report)  Colorectal cancer screening via Cologuard due since February 2024  Influenza immunization recommended every fall    Plan:  Metoprolol succinate 25 mg daily  Continue losartan 100 mg daily, atorvastatin 80 mg daily, ezetimibe 10 mg daily and vitamin D 50,000 international units weekly  Please schedule a fasting lab appointment followed by Medicare wellness evaluation appointment in January or sooner with any problems  Please always arrive at least 15 minutes before your scheduled appointment time.

## 2024-07-09 NOTE — PROGRESS NOTES
Terry Escamilla is a 63 y.o. male (: 1960) presenting to address:    Chief Complaint   Patient presents with    Discuss Medications     Metoprolol succinate frequency .        Vitals:    24 0848   BP: 130/70   Pulse: 76   Resp: 18   Temp: 98.5 °F (36.9 °C)   SpO2: 96%       \"Have you been to the ER, urgent care clinic since your last visit?  Hospitalized since your last visit?\"    NO    “Have you seen or consulted any other health care providers outside of [facility] since your last visit?”    NO    “Have you had a colorectal cancer screening such as a colonoscopy/FIT/Cologuard?    NO    No colonoscopy on file  Date of last Cologuard: 2021  No FIT/FOBT on file   No flexible sigmoidoscopy on file              
artery disease involving native coronary artery of native heart without angina pectoris    Acute diverticulitis of intestine    Severe obesity (BMI 35.0-39.9) with comorbidity (HCC)    Bilateral carpal tunnel syndrome    Mixed hyperlipidemia    Perforated diverticulum    Ex-smoker    Aorta aneurysm (HCC)    Essential hypertension    Mitral regurgitation    Prediabetes    Obesity (BMI 30-39.9)    Pulmonary emphysema, unspecified emphysema type (HCC)    Aneurysm of the ascending aorta, without rupture (HCC)         Current Outpatient Medications:     metoprolol succinate (TOPROL XL) 25 MG extended release tablet, Take 1 tablet by mouth 2 times daily, Disp: 60 tablet, Rfl: 0    atorvastatin (LIPITOR) 80 MG tablet, Take 1 tablet by mouth daily, Disp: 90 tablet, Rfl: 3    aspirin 325 MG tablet, Take 1 tablet by mouth daily, Disp: , Rfl:     losartan (COZAAR) 100 MG tablet, TAKE 1 TABLET BY MOUTH ONCE DAILY, Disp: 90 tablet, Rfl: 3    ezetimibe (ZETIA) 10 MG tablet, Take 1 tablet by mouth daily, Disp: 100 tablet, Rfl: 3    vitamin D (ERGOCALCIFEROL) 1.25 MG (36351 UT) CAPS capsule, TAKE 1 CAPSULE BY MOUTH EVERY WEEK, Disp: 12 capsule, Rfl: 3    nitroGLYCERIN (NITROSTAT) 0.4 MG SL tablet, Place 1 tablet under the tongue, Disp: , Rfl:       Review of Systems   Constitutional:  Negative for fever and unexpected weight change.   Respiratory:  Positive for shortness of breath (Chronically progressive shortness of breath, history of COPD). Negative for chest tightness.    Cardiovascular:  Negative for chest pain, palpitations and leg swelling.   Musculoskeletal:  Positive for arthralgias (bilateral knee pain worsening, increased pain attempting to move from sitting to upright and with weightbearing).   Neurological:  Negative for dizziness, speech difficulty, light-headedness and headaches.   Psychiatric/Behavioral:  Negative for confusion.        /70   Pulse 76   Temp 98.5 °F (36.9 °C) (Temporal)   Resp 18   Ht 1.854

## 2024-07-10 ENCOUNTER — TELEPHONE (OUTPATIENT)
Dept: FAMILY MEDICINE CLINIC | Facility: CLINIC | Age: 64
End: 2024-07-10

## 2024-07-23 LAB — NONINV COLON CA DNA+OCC BLD SCRN STL QL: NEGATIVE

## 2024-07-30 RX ORDER — METOPROLOL SUCCINATE 25 MG/1
25 TABLET, EXTENDED RELEASE ORAL DAILY
Qty: 90 TABLET | Refills: 3 | Status: SHIPPED | OUTPATIENT
Start: 2024-07-30

## 2024-07-30 NOTE — TELEPHONE ENCOUNTER
Last refilled 6/27/24 for 60 with 0 refills. Last ov 7/9/24   Future Appointments   Date Time Provider Department Center   10/22/2024 10:15 AM Kam Soto MD [facility] BS AMB   1/8/2025  8:00 AM LAB_[facility] BS AMB   1/15/2025  8:00 AM Jarrett Meade MD [facility] BS AMB

## 2024-08-21 RX ORDER — ERGOCALCIFEROL 1.25 MG/1
50000 CAPSULE ORAL WEEKLY
Qty: 12 CAPSULE | Refills: 3 | Status: SHIPPED | OUTPATIENT
Start: 2024-08-21

## 2024-08-21 NOTE — TELEPHONE ENCOUNTER
Last refilled 9/25/23 for 12 with 3 refills. Last ov 7/9/24   Future Appointments   Date Time Provider Department Center   10/22/2024 10:15 AM Kam Soto MD [facility] BS AMB   1/8/2025  8:[facility]   1/15/2025  8:00 AM [facility]

## 2024-10-04 ENCOUNTER — NEW PATIENT (OUTPATIENT)
Dept: URBAN - METROPOLITAN AREA CLINIC 1 | Facility: CLINIC | Age: 64
End: 2024-10-04

## 2024-10-04 DIAGNOSIS — H25.813: ICD-10-CM

## 2024-10-04 PROCEDURE — 99204 OFFICE O/P NEW MOD 45 MIN: CPT

## 2024-10-22 ENCOUNTER — OFFICE VISIT (OUTPATIENT)
Age: 64
End: 2024-10-22
Payer: MEDICARE

## 2024-10-22 VITALS
HEIGHT: 73 IN | WEIGHT: 278 LBS | BODY MASS INDEX: 36.84 KG/M2 | HEART RATE: 79 BPM | DIASTOLIC BLOOD PRESSURE: 75 MMHG | OXYGEN SATURATION: 96 % | SYSTOLIC BLOOD PRESSURE: 131 MMHG

## 2024-10-22 DIAGNOSIS — I25.10 CORONARY ARTERY DISEASE DUE TO LIPID RICH PLAQUE: Primary | ICD-10-CM

## 2024-10-22 DIAGNOSIS — E78.00 PURE HYPERCHOLESTEROLEMIA: ICD-10-CM

## 2024-10-22 DIAGNOSIS — I10 ESSENTIAL HYPERTENSION WITH GOAL BLOOD PRESSURE LESS THAN 140/90: ICD-10-CM

## 2024-10-22 DIAGNOSIS — I25.83 CORONARY ARTERY DISEASE DUE TO LIPID RICH PLAQUE: Primary | ICD-10-CM

## 2024-10-22 PROCEDURE — 3017F COLORECTAL CA SCREEN DOC REV: CPT | Performed by: INTERNAL MEDICINE

## 2024-10-22 PROCEDURE — 3078F DIAST BP <80 MM HG: CPT | Performed by: INTERNAL MEDICINE

## 2024-10-22 PROCEDURE — 1036F TOBACCO NON-USER: CPT | Performed by: INTERNAL MEDICINE

## 2024-10-22 PROCEDURE — 99214 OFFICE O/P EST MOD 30 MIN: CPT | Performed by: INTERNAL MEDICINE

## 2024-10-22 PROCEDURE — G8484 FLU IMMUNIZE NO ADMIN: HCPCS | Performed by: INTERNAL MEDICINE

## 2024-10-22 PROCEDURE — 1123F ACP DISCUSS/DSCN MKR DOCD: CPT | Performed by: INTERNAL MEDICINE

## 2024-10-22 PROCEDURE — G8427 DOCREV CUR MEDS BY ELIG CLIN: HCPCS | Performed by: INTERNAL MEDICINE

## 2024-10-22 PROCEDURE — G8417 CALC BMI ABV UP PARAM F/U: HCPCS | Performed by: INTERNAL MEDICINE

## 2024-10-22 PROCEDURE — 3075F SYST BP GE 130 - 139MM HG: CPT | Performed by: INTERNAL MEDICINE

## 2024-10-22 NOTE — PROGRESS NOTES
Identified pt with two pt identifiers(name and ). Reviewed record in preparation for visit and have obtained necessary documentation.    Terry Escamilla presents today for   Chief Complaint   Patient presents with    Follow-up      6m         Pt denied DIZZINESS, SOB, CHEST PAIN/ PRESSURE, FATIGUE/WEAKNESS, HEADACHES, SWELLING.             Terry Escamilla preferred language for health care discussion is english/other.    Personal Protective Equipment:   Personal Protective Equipment was used including: mask-surgical and hands-gloves. Patient was placed on no precaution(s). Patient was not masked.    Precautions:   Patient currently on None  Patient currently roomed with door closed.    Is someone accompanying this pt? no    Is the patient using any DME equipment during OV? no    Depression Screenin/9/2024     8:49 AM 2/3/2023     1:49 PM   PHQ-9 Questionaire   Little interest or pleasure in doing things 0 0   Feeling down, depressed, or hopeless 0 0   PHQ-9 Total Score 0 0        Learning Assessment:  Completed  Abuse Screenin/30/2024    11:00 AM   AMB Abuse Screening   Do you ever feel afraid of your partner? N   Are you in a relationship with someone who physically or mentally threatens you? N   Is it safe for you to go home? Y          Fall Risk      2024    11:05 AM 2024     8:49 AM   Fall Risk   Do you feel unsteady or are you worried about falling?  no yes   2 or more falls in past year? no no   Fall with injury in past year? no no         Pt currently taking Anticoagulant /Antiplatelet therapy? no    Coordination of Care:  1. Have you been to the ER, urgent care clinic since your last visit? Hospitalized since your last visit? no    2. Have you seen or consulted any other health care providers outside of the [facility] since your last visit? Include any pap smears or colon screening. no      Please see Red banners under Allergies and Med Rec to remove outside inquires. 
root diameter is 4.3 cm. Dilated ascending aorta. Ao ascending diameter is 3.9 cm.    Image quality is adequate.    Study Details: Patient declined use of left ventricular opacification agent to enhance imaging, due to adverse reaction to Definity in past.    NM STRESS TEST WITH MYOCARDIAL PERFUSION 03/06/2024  5:14 PM (Final)  Interpretation Summary    Stress Test: A pharmacological stress test was performed using lexiscan. The patient reported no symptoms during the stress test.    ECG: Resting ECG demonstrates normal sinus rhythm.    Perfusion Conclusion: TID ratio is 0.94.    Stress Function: Post-stress calculated ejection fraction is 29%, however, I suspect significantly underestimated due to gating artifact.  Likely much better than reported value.    Perfusion Comments: LV perfusion is normal.    Stress Combined Conclusion: Findings suggest a low risk of cardiac events.  No evidence of ischemia or previous infarct.  EF calculated of 29% likely due to gating artifact.  Consider additional modality to evaluate EF.    CATHETERIZATION: March and April 2016:   LM - normal  LAD - 80-90% ostial/prox (4.0 x 18mm XIENCE 4/16)  D1 - 70%  Cx - 50-60% prox  OM1 - 70-80% mid (4.0 x 15mm XIENCE 4/16)  RCA - plaquing  RPDA - normal  RPLV - normal  EF 50%   MR 2+     IMPRESSION & PLAN:  Terry Escamilla is a 64 y.o. male with:     Coronary artery disease:   [facility] in 04/2016 status post ostial proximal LAD PAM, LCx/OM  PAM  He does have documented 70% stenosis of first diagonal branch by cath in 2016  NST in 01/17 without on going ischemia.  NST in July 2020, low risk.  NST 02/2024, Low risk   Continued on statin, BB and aspirin 325 mg daily, denies s/s bleeding   Denies recent SL NTG use since last visit.  No significant chest pain or chest tightness     Dyspnea on exertion  Low risk NST 03/2024  TTE 02/2024, EF 55-60%, normal diastolic function  No evidence of fluid overload.  Has appointment with pulmonary team in

## 2024-12-02 ENCOUNTER — PATIENT MESSAGE (OUTPATIENT)
Dept: FAMILY MEDICINE CLINIC | Facility: CLINIC | Age: 64
End: 2024-12-02

## 2024-12-02 RX ORDER — ERGOCALCIFEROL 1.25 MG/1
50000 CAPSULE, LIQUID FILLED ORAL WEEKLY
Qty: 12 CAPSULE | Refills: 3 | Status: SHIPPED | OUTPATIENT
Start: 2024-12-02

## 2024-12-02 RX ORDER — EZETIMIBE 10 MG/1
10 TABLET ORAL DAILY
Qty: 100 TABLET | Refills: 3 | Status: SHIPPED | OUTPATIENT
Start: 2024-12-02

## 2024-12-02 NOTE — TELEPHONE ENCOUNTER
Vitamin d last refilled 8.21.24 for 12 with 3 refills . Zetia 12/6/23 for 100 with 3 refills. Last ov 7/9/24   Future Appointments   Date Time Provider Department Center   1/8/2025  8:[facility]   1/15/2025  8:00 AM [facility]   7/24/2025 10:15 AM Kam Soto MD [facility] BS AMB

## 2025-01-02 SDOH — HEALTH STABILITY: PHYSICAL HEALTH: ON AVERAGE, HOW MANY MINUTES DO YOU ENGAGE IN EXERCISE AT THIS LEVEL?: 30 MIN

## 2025-01-02 SDOH — HEALTH STABILITY: PHYSICAL HEALTH: ON AVERAGE, HOW MANY DAYS PER WEEK DO YOU ENGAGE IN MODERATE TO STRENUOUS EXERCISE (LIKE A BRISK WALK)?: 2 DAYS

## 2025-01-02 ASSESSMENT — LIFESTYLE VARIABLES
HOW OFTEN DURING THE LAST YEAR HAVE YOU HAD A FEELING OF GUILT OR REMORSE AFTER DRINKING: NEVER
HOW OFTEN DURING THE LAST YEAR HAVE YOU HAD A FEELING OF GUILT OR REMORSE AFTER DRINKING: NEVER
HOW OFTEN DURING THE LAST YEAR HAVE YOU BEEN UNABLE TO REMEMBER WHAT HAPPENED THE NIGHT BEFORE BECAUSE YOU HAD BEEN DRINKING: NEVER
HOW OFTEN DURING THE LAST YEAR HAVE YOU BEEN UNABLE TO REMEMBER WHAT HAPPENED THE NIGHT BEFORE BECAUSE YOU HAD BEEN DRINKING: NEVER
HAVE YOU OR SOMEONE ELSE BEEN INJURED AS A RESULT OF YOUR DRINKING: NO
HOW OFTEN DURING THE LAST YEAR HAVE YOU NEEDED AN ALCOHOLIC DRINK FIRST THING IN THE MORNING TO GET YOURSELF GOING AFTER A NIGHT OF HEAVY DRINKING: NEVER
HOW OFTEN DURING THE LAST YEAR HAVE YOU FOUND THAT YOU WERE NOT ABLE TO STOP DRINKING ONCE YOU HAD STARTED: NEVER
HAS A RELATIVE, FRIEND, DOCTOR, OR ANOTHER HEALTH PROFESSIONAL EXPRESSED CONCERN ABOUT YOUR DRINKING OR SUGGESTED YOU CUT DOWN: NO
HOW OFTEN DO YOU HAVE A DRINK CONTAINING ALCOHOL: 2-4 TIMES A MONTH
HOW OFTEN DO YOU HAVE SIX OR MORE DRINKS ON ONE OCCASION: 2
HOW OFTEN DURING THE LAST YEAR HAVE YOU FAILED TO DO WHAT WAS NORMALLY EXPECTED FROM YOU BECAUSE OF DRINKING: NEVER
HAS A RELATIVE, FRIEND, DOCTOR, OR ANOTHER HEALTH PROFESSIONAL EXPRESSED CONCERN ABOUT YOUR DRINKING OR SUGGESTED YOU CUT DOWN: NO
HOW MANY STANDARD DRINKS CONTAINING ALCOHOL DO YOU HAVE ON A TYPICAL DAY: 3
HOW OFTEN DURING THE LAST YEAR HAVE YOU NEEDED AN ALCOHOLIC DRINK FIRST THING IN THE MORNING TO GET YOURSELF GOING AFTER A NIGHT OF HEAVY DRINKING: NEVER
HOW OFTEN DURING THE LAST YEAR HAVE YOU FAILED TO DO WHAT WAS NORMALLY EXPECTED FROM YOU BECAUSE OF DRINKING: NEVER
HOW OFTEN DURING THE LAST YEAR HAVE YOU FOUND THAT YOU WERE NOT ABLE TO STOP DRINKING ONCE YOU HAD STARTED: NEVER
HOW OFTEN DO YOU HAVE A DRINK CONTAINING ALCOHOL: 3
HOW MANY STANDARD DRINKS CONTAINING ALCOHOL DO YOU HAVE ON A TYPICAL DAY: 5 OR 6
HAVE YOU OR SOMEONE ELSE BEEN INJURED AS A RESULT OF YOUR DRINKING: NO

## 2025-01-02 ASSESSMENT — PATIENT HEALTH QUESTIONNAIRE - PHQ9
SUM OF ALL RESPONSES TO PHQ9 QUESTIONS 1 & 2: 0
2. FEELING DOWN, DEPRESSED OR HOPELESS: NOT AT ALL
SUM OF ALL RESPONSES TO PHQ QUESTIONS 1-9: 0
1. LITTLE INTEREST OR PLEASURE IN DOING THINGS: NOT AT ALL
SUM OF ALL RESPONSES TO PHQ QUESTIONS 1-9: 0

## 2025-01-08 ENCOUNTER — HOSPITAL ENCOUNTER (OUTPATIENT)
Facility: HOSPITAL | Age: 65
Setting detail: SPECIMEN
Discharge: HOME | End: 2025-01-11
Payer: MEDICARE

## 2025-01-08 LAB
25(OH)D3 SERPL-MCNC: 79 NG/ML (ref 30–100)
ALBUMIN SERPL-MCNC: 3.8 G/DL (ref 3.4–5)
ALBUMIN/GLOB SERPL: 1 (ref 0.8–1.7)
ALP SERPL-CCNC: 94 U/L (ref 45–117)
ALT SERPL-CCNC: 33 U/L (ref 16–61)
ANION GAP SERPL CALC-SCNC: 4 MMOL/L (ref 3–18)
APPEARANCE UR: CLEAR
AST SERPL-CCNC: 23 U/L (ref 10–38)
BASOPHILS # BLD: 0.07 K/UL (ref 0–0.1)
BASOPHILS NFR BLD: 0.8 % (ref 0–2)
BILIRUB SERPL-MCNC: 0.8 MG/DL (ref 0.2–1)
BILIRUB UR QL: NEGATIVE
BUN SERPL-MCNC: 25 MG/DL (ref 7–18)
BUN/CREAT SERPL: 30 (ref 12–20)
CALCIUM SERPL-MCNC: 9.1 MG/DL (ref 8.5–10.1)
CHLORIDE SERPL-SCNC: 105 MMOL/L (ref 100–111)
CHOLEST SERPL-MCNC: 119 MG/DL
CO2 SERPL-SCNC: 29 MMOL/L (ref 21–32)
COLOR UR: YELLOW
CREAT SERPL-MCNC: 0.83 MG/DL (ref 0.6–1.3)
DIFFERENTIAL METHOD BLD: ABNORMAL
EOSINOPHIL # BLD: 0.21 K/UL (ref 0–0.4)
EOSINOPHIL NFR BLD: 2.5 % (ref 0–5)
ERYTHROCYTE [DISTWIDTH] IN BLOOD BY AUTOMATED COUNT: 12.5 % (ref 11.6–14.5)
EST. AVERAGE GLUCOSE BLD GHB EST-MCNC: 131 MG/DL
GLOBULIN SER CALC-MCNC: 4 G/DL (ref 2–4)
GLUCOSE SERPL-MCNC: 140 MG/DL (ref 74–99)
GLUCOSE UR STRIP.AUTO-MCNC: NEGATIVE MG/DL
HBA1C MFR BLD: 6.2 % (ref 4.2–5.6)
HCT VFR BLD AUTO: 43.4 % (ref 36–48)
HDLC SERPL-MCNC: 44 MG/DL (ref 40–60)
HDLC SERPL: 2.7 (ref 0–5)
HGB BLD-MCNC: 14.7 G/DL (ref 13–16)
HGB UR QL STRIP: NEGATIVE
IMM GRANULOCYTES # BLD AUTO: 0.02 K/UL (ref 0–0.04)
IMM GRANULOCYTES NFR BLD AUTO: 0.2 % (ref 0–0.5)
KETONES UR QL STRIP.AUTO: NEGATIVE MG/DL
LDLC SERPL CALC-MCNC: 53.2 MG/DL (ref 0–100)
LEUKOCYTE ESTERASE UR QL STRIP.AUTO: NEGATIVE
LIPID PANEL: NORMAL
LYMPHOCYTES # BLD: 3.69 K/UL (ref 0.9–3.6)
LYMPHOCYTES NFR BLD: 44.5 % (ref 21–52)
MCH RBC QN AUTO: 31.6 PG (ref 24–34)
MCHC RBC AUTO-ENTMCNC: 33.9 G/DL (ref 31–37)
MCV RBC AUTO: 93.3 FL (ref 78–100)
MONOCYTES # BLD: 0.71 K/UL (ref 0.05–1.2)
MONOCYTES NFR BLD: 8.6 % (ref 3–10)
NEUTS SEG # BLD: 3.59 K/UL (ref 1.8–8)
NEUTS SEG NFR BLD: 43.4 % (ref 40–73)
NITRITE UR QL STRIP.AUTO: NEGATIVE
NRBC # BLD: 0 K/UL (ref 0–0.01)
NRBC BLD-RTO: 0 PER 100 WBC
PH UR STRIP: 5 (ref 5–8)
PLATELET # BLD AUTO: 188 K/UL (ref 135–420)
PMV BLD AUTO: 9.1 FL (ref 9.2–11.8)
POTASSIUM SERPL-SCNC: 4.6 MMOL/L (ref 3.5–5.5)
PROT SERPL-MCNC: 7.8 G/DL (ref 6.4–8.2)
PROT UR STRIP-MCNC: NEGATIVE MG/DL
PSA SERPL-MCNC: 0.6 NG/ML (ref 0–4)
RBC # BLD AUTO: 4.65 M/UL (ref 4.35–5.65)
SODIUM SERPL-SCNC: 138 MMOL/L (ref 136–145)
SP GR UR REFRACTOMETRY: 1.02 (ref 1–1.03)
TRIGL SERPL-MCNC: 109 MG/DL
TSH SERPL DL<=0.05 MIU/L-ACNC: 2.02 UIU/ML (ref 0.36–3.74)
UROBILINOGEN UR QL STRIP.AUTO: 0.2 EU/DL (ref 0.2–1)
VLDLC SERPL CALC-MCNC: 21.8 MG/DL
WBC # BLD AUTO: 8.3 K/UL (ref 4.6–13.2)

## 2025-01-08 PROCEDURE — G0103 PSA SCREENING: HCPCS

## 2025-01-08 PROCEDURE — 36415 COLL VENOUS BLD VENIPUNCTURE: CPT

## 2025-01-08 PROCEDURE — 80053 COMPREHEN METABOLIC PANEL: CPT

## 2025-01-08 PROCEDURE — 85025 COMPLETE CBC W/AUTO DIFF WBC: CPT

## 2025-01-08 PROCEDURE — 82306 VITAMIN D 25 HYDROXY: CPT

## 2025-01-08 PROCEDURE — 84443 ASSAY THYROID STIM HORMONE: CPT

## 2025-01-08 PROCEDURE — 81003 URINALYSIS AUTO W/O SCOPE: CPT

## 2025-01-08 PROCEDURE — 83036 HEMOGLOBIN GLYCOSYLATED A1C: CPT

## 2025-01-08 PROCEDURE — 80061 LIPID PANEL: CPT

## 2025-01-08 RX ORDER — LOSARTAN POTASSIUM 100 MG/1
100 TABLET ORAL DAILY
Qty: 90 TABLET | Refills: 3 | Status: SHIPPED | OUTPATIENT
Start: 2025-01-08

## 2025-01-08 NOTE — TELEPHONE ENCOUNTER
Last refilled 1/26/24 for 90 with 3 refills.last ov 7/9/24   Future Appointments   Date Time Provider Department Center   1/8/2025  8:00 AM [facility]   1/15/2025  8:00 AM [facility]ade [facility]   1/20/2025 10:00 AM [facility]   7/24/2025 10:15 AM Kam Soto MD [facility] BS AMB

## 2025-01-14 PROBLEM — I50.22 HEART FAILURE WITH MILDLY REDUCED EJECTION FRACTION (HFMREF) (HCC): Status: ACTIVE | Noted: 2025-01-14

## 2025-01-14 SDOH — ECONOMIC STABILITY: INCOME INSECURITY: IN THE LAST 12 MONTHS, WAS THERE A TIME WHEN YOU WERE NOT ABLE TO PAY THE MORTGAGE OR RENT ON TIME?: NO

## 2025-01-14 SDOH — ECONOMIC STABILITY: FOOD INSECURITY: WITHIN THE PAST 12 MONTHS, THE FOOD YOU BOUGHT JUST DIDN'T LAST AND YOU DIDN'T HAVE MONEY TO GET MORE.: NEVER TRUE

## 2025-01-14 SDOH — ECONOMIC STABILITY: FOOD INSECURITY: WITHIN THE PAST 12 MONTHS, YOU WORRIED THAT YOUR FOOD WOULD RUN OUT BEFORE YOU GOT MONEY TO BUY MORE.: NEVER TRUE

## 2025-01-14 SDOH — ECONOMIC STABILITY: TRANSPORTATION INSECURITY
IN THE PAST 12 MONTHS, HAS THE LACK OF TRANSPORTATION KEPT YOU FROM MEDICAL APPOINTMENTS OR FROM GETTING MEDICATIONS?: NO

## 2025-01-14 ASSESSMENT — ENCOUNTER SYMPTOMS
VOMITING: 0
DIARRHEA: 0
SORE THROAT: 0
BLOOD IN STOOL: 0
NAUSEA: 0
ABDOMINAL PAIN: 0
CHEST TIGHTNESS: 0
COUGH: 0
ANAL BLEEDING: 0
CONSTIPATION: 0

## 2025-01-14 NOTE — PROGRESS NOTES
HISTORY OF PRESENT ILLNESS  Terry Escamilla  is a 64 y.o. y.o. male    He presents for follow-up with a history of hypertension, prediabetes, hyperlipidemia, coronary artery disease and thoracic aortic aneurysm followed by cardiology, COPD, obesity and vitamin D deficiency as well as for Medicare wellness evaluation.        Mr#: [id number]      Past Medical History:   Diagnosis Date    Aorta aneurysm (HCC)     Coronary artery disease     LAD - 4.0 x 18mm XIENCE 4/16, OM1 - 4.0 x 15mm XIENCE 4/16     Dyslipidemia     Granuloma annulare     Hypertension     Hypertensive cardiovascular disease     Mitral regurgitation     moderate (TTE 02/17),  moderate (MARIBEL 4/16)    Obesity     Pulmonary fibrosis (HCC)     upper lobes bilat (CTA 12/16)    Seborrheic dermatitis     Tobacco abuse, in remission     quit 2012       Past Surgical History:   Procedure Laterality Date    CERVICAL FUSION      cervical fusion    ORTHOPEDIC SURGERY      prior (L) knee surgery       Family History   Problem Relation Age of Onset    Post-op Nausea/Vomiting Mother     Cancer Mother         lung cancer     Heart Disease Father     Headache Father     Heart Attack Father     Heart Disease Maternal Grandmother     Hypertension Maternal Grandmother     Cancer Paternal Grandmother         throat cancer     Stroke Neg Hx     Diabetes Neg Hx        Allergies   Allergen Reactions    Definity [Perflutren Lipid Microsphere] Other (See Comments)     Severe, sharp, lingering back pain. Reaction was experienced on 7/7/20.     Ticagrelor Other (See Comments)     dyspnea    Other Reaction(s): Not available    Sildenafil Other (See Comments) and Nausea And Vomiting     Flushing    Vardenafil Other (See Comments) and Nausea And Vomiting     Flushing       Social History     Tobacco Use   Smoking Status Former    Current packs/day: 1.00    Average packs/day: 1 pack/day for 12.1 years (12.1 ttl pk-yrs)    Types: Cigarettes    Start date: 12/2012    Quit date: 10/1/1975

## 2025-01-15 ENCOUNTER — OFFICE VISIT (OUTPATIENT)
Dept: FAMILY MEDICINE CLINIC | Facility: CLINIC | Age: 65
End: 2025-01-15

## 2025-01-15 VITALS
RESPIRATION RATE: 16 BRPM | DIASTOLIC BLOOD PRESSURE: 70 MMHG | HEIGHT: 73 IN | OXYGEN SATURATION: 96 % | BODY MASS INDEX: 36.45 KG/M2 | TEMPERATURE: 97.9 F | HEART RATE: 77 BPM | SYSTOLIC BLOOD PRESSURE: 132 MMHG | WEIGHT: 275 LBS

## 2025-01-15 DIAGNOSIS — I10 ESSENTIAL HYPERTENSION: Primary | ICD-10-CM

## 2025-01-15 DIAGNOSIS — J43.9 PULMONARY EMPHYSEMA, UNSPECIFIED EMPHYSEMA TYPE (HCC): ICD-10-CM

## 2025-01-15 DIAGNOSIS — E55.9 VITAMIN D DEFICIENCY: ICD-10-CM

## 2025-01-15 DIAGNOSIS — I50.22 HEART FAILURE WITH MILDLY REDUCED EJECTION FRACTION (HFMREF) (HCC): ICD-10-CM

## 2025-01-15 DIAGNOSIS — R73.03 PREDIABETES: ICD-10-CM

## 2025-01-15 DIAGNOSIS — Z00.00 INITIAL MEDICARE ANNUAL WELLNESS VISIT: ICD-10-CM

## 2025-01-15 DIAGNOSIS — E66.01 SEVERE OBESITY (BMI 35.0-39.9) WITH COMORBIDITY: ICD-10-CM

## 2025-01-15 DIAGNOSIS — E78.2 MIXED HYPERLIPIDEMIA: ICD-10-CM

## 2025-01-15 DIAGNOSIS — I25.10 CORONARY ARTERY DISEASE INVOLVING NATIVE CORONARY ARTERY OF NATIVE HEART WITHOUT ANGINA PECTORIS: ICD-10-CM

## 2025-01-15 DIAGNOSIS — G56.03 BILATERAL CARPAL TUNNEL SYNDROME: ICD-10-CM

## 2025-01-15 RX ORDER — METFORMIN HYDROCHLORIDE 500 MG/1
500 TABLET, EXTENDED RELEASE ORAL
Qty: 90 TABLET | Refills: 3 | Status: SHIPPED | OUTPATIENT
Start: 2025-01-15

## 2025-01-15 RX ORDER — BUDESONIDE, GLYCOPYRROLATE, AND FORMOTEROL FUMARATE 160; 9; 4.8 UG/1; UG/1; UG/1
2 AEROSOL, METERED RESPIRATORY (INHALATION) 2 TIMES DAILY
COMMUNITY
Start: 2024-12-09

## 2025-01-15 RX ORDER — EZETIMIBE 10 MG/1
10 TABLET ORAL DAILY
Qty: 100 TABLET | Refills: 3 | Status: SHIPPED | OUTPATIENT
Start: 2025-01-15

## 2025-01-15 RX ORDER — ALBUTEROL SULFATE 90 UG/1
2 INHALANT RESPIRATORY (INHALATION) EVERY 6 HOURS PRN
COMMUNITY
Start: 2024-12-09

## 2025-01-15 ASSESSMENT — ENCOUNTER SYMPTOMS
SHORTNESS OF BREATH: 1
WHEEZING: 1

## 2025-01-15 NOTE — PROGRESS NOTES
Terry Escamilla is a 64 y.o. male (: 1960) presenting to address:    Chief Complaint   Patient presents with    Medicare AWV    Medication Problem     Thinks he is having a reaction to Breztri .        Vitals:    01/15/25 0752   BP: 132/70   Pulse: 77   Resp: 16   Temp: 97.9 °F (36.6 °C)   SpO2: 96%       \"Have you been to the ER, urgent care clinic since your last visit?  Hospitalized since your last visit?\"    NO    “Have you seen or consulted any other health care providers outside of [facility] since your last visit?”    NO

## 2025-01-15 NOTE — PATIENT INSTRUCTIONS
doctor if you think you are having a problem with your medicine.     If your doctor recommends aspirin, take the amount directed each day. Make sure you take aspirin and not another kind of pain reliever, such as acetaminophen (Tylenol).   When should you call for help?   Call 911 if you have symptoms of a heart attack. These may include:    Chest pain or pressure, or a strange feeling in the chest.     Sweating.     Shortness of breath.     Pain, pressure, or a strange feeling in the back, neck, jaw, or upper belly or in one or both shoulders or arms.     Lightheadedness or sudden weakness.     A fast or irregular heartbeat.   After you call 911, the  may tell you to chew 1 adult-strength or 2 to 4 low-dose aspirin. Wait for an ambulance. Do not try to drive yourself.  Watch closely for changes in your health, and be sure to contact your doctor if you have any problems.  Where can you learn more?  Go to https://www.[employer].net/patientEd and enter F075 to learn more about \"A Healthy Heart: Care Instructions.\"  Current as of: July 31, 2024  Content Version: 14.3  © 2024 [employer].   Care instructions adapted under license by [employer]. If you have questions about a medical condition or this instruction, always ask your healthcare professional. [employer] disclaims any warranty or liability for your use of this information.    Personalized Preventive Plan for Terry Escamilla - 1/15/2025  Medicare offers a range of preventive health benefits. Some of the tests and screenings are paid in full while other may be subject to a deductible, co-insurance, and/or copay.  Some of these benefits include a comprehensive review of your medical history including lifestyle, illnesses that may run in your family, and various assessments and screenings as appropriate.  After reviewing your medical record and screening and assessments performed today your provider may have ordered immunizations,

## 2025-01-30 RX ORDER — EZETIMIBE 10 MG/1
10 TABLET ORAL DAILY
Qty: 100 TABLET | Refills: 3 | OUTPATIENT
Start: 2025-01-30

## 2025-04-04 ENCOUNTER — FOLLOW UP (OUTPATIENT)
Age: 65
End: 2025-04-04

## 2025-04-04 DIAGNOSIS — H25.813: ICD-10-CM

## 2025-04-04 PROCEDURE — 92015 DETERMINE REFRACTIVE STATE: CPT

## 2025-04-04 PROCEDURE — 99213 OFFICE O/P EST LOW 20 MIN: CPT

## 2025-04-07 RX ORDER — ATORVASTATIN CALCIUM 80 MG/1
80 TABLET, FILM COATED ORAL DAILY
Qty: 90 TABLET | Refills: 3 | Status: SHIPPED | OUTPATIENT
Start: 2025-04-07

## 2025-04-07 NOTE — TELEPHONE ENCOUNTER
Last refilled 4/8/24 for 90 with 3 refills .last ov 1/15/25   Future Appointments   Date Time Provider Department Center   7/15/2025  9:00 AM [facility]   7/24/2025 10:15 AM Kam Soto MD [facility]

## 2025-06-03 ENCOUNTER — PRE-OP/H&P (OUTPATIENT)
Age: 65
End: 2025-06-03

## 2025-06-03 VITALS
BODY MASS INDEX: 37.11 KG/M2 | HEIGHT: 73 IN | DIASTOLIC BLOOD PRESSURE: 65 MMHG | SYSTOLIC BLOOD PRESSURE: 131 MMHG | WEIGHT: 280 LBS | HEART RATE: 79 BPM

## 2025-06-03 DIAGNOSIS — H25.813: ICD-10-CM

## 2025-06-03 PROCEDURE — 99499 UNLISTED E&M SERVICE: CPT

## 2025-06-03 PROCEDURE — 92025 CPTRIZED CORNEAL TOPOGRAPHY: CPT

## 2025-06-03 PROCEDURE — 92136 OPHTHALMIC BIOMETRY: CPT

## 2025-06-11 ENCOUNTER — SURGERY/PROCEDURE (OUTPATIENT)
Age: 65
End: 2025-06-11

## 2025-06-11 DIAGNOSIS — H25.812: ICD-10-CM

## 2025-06-11 PROCEDURE — 66984 XCAPSL CTRC RMVL W/O ECP: CPT

## 2025-06-12 ENCOUNTER — POST-OP (OUTPATIENT)
Age: 65
End: 2025-06-12

## 2025-06-12 DIAGNOSIS — Z96.1: ICD-10-CM

## 2025-06-12 DIAGNOSIS — H25.811: ICD-10-CM

## 2025-06-12 PROCEDURE — 92025 CPTRIZED CORNEAL TOPOGRAPHY: CPT | Mod: NC

## 2025-06-12 PROCEDURE — 92136 OPHTHALMIC BIOMETRY: CPT | Mod: 26

## 2025-06-25 ENCOUNTER — SURGERY/PROCEDURE (OUTPATIENT)
Age: 65
End: 2025-06-25

## 2025-06-25 DIAGNOSIS — H25.811: ICD-10-CM

## 2025-06-25 PROCEDURE — 66984 XCAPSL CTRC RMVL W/O ECP: CPT | Mod: 79,RT

## 2025-06-26 ENCOUNTER — POST-OP (OUTPATIENT)
Age: 65
End: 2025-06-26

## 2025-06-26 DIAGNOSIS — Z96.1: ICD-10-CM

## 2025-07-14 ASSESSMENT — ENCOUNTER SYMPTOMS
SHORTNESS OF BREATH: 0
CHEST TIGHTNESS: 0

## 2025-07-14 NOTE — PROGRESS NOTES
HISTORY OF PRESENT ILLNESS  Terry Escamilla  is a 64 y.o. y.o. male    He presents for 6-month interval follow-up of hypertension, prediabetes, hyperlipidemia, as well as coronary artery disease, thoracic aortic aneurysm followed by cardiology, COPD followed by pulmonary medicine, obesity and vitamin D deficiency.  He was started on metformin  mg daily 6 months ago because of a rising hemoglobin A1c.        Mr#: [id number]      Past Medical History:   Diagnosis Date    Aorta aneurysm     Coronary artery disease     LAD - 4.0 x 18mm XIENCE 4/16, OM1 - 4.0 x 15mm XIENCE 4/16     Dyslipidemia     Emphysema of lung (HCC)     Granuloma annulare     Hypertension     Hypertensive cardiovascular disease     Mitral regurgitation     moderate (TTE 02/17),  moderate (MARIBEL 4/16)    Obesity     Pulmonary fibrosis (HCC)     upper lobes bilat (CTA 12/16)    Seborrheic dermatitis     Tobacco abuse, in remission     quit 2012       Past Surgical History:   Procedure Laterality Date    CERVICAL FUSION      cervical fusion    ORTHOPEDIC SURGERY      prior (L) knee surgery       Family History   Problem Relation Age of Onset    Post-op Nausea/Vomiting Mother     Cancer Mother         lung cancer     Heart Disease Father     Headache Father     Heart Attack Father     Heart Disease Maternal Grandmother     Hypertension Maternal Grandmother     Cancer Paternal Grandmother         throat cancer     Stroke Neg Hx     Diabetes Neg Hx        Allergies   Allergen Reactions    Definity [Perflutren Lipid Microsphere] Other (See Comments)     Severe, sharp, lingering back pain. Reaction was experienced on 7/7/20.     Ticagrelor Other (See Comments)     dyspnea    Other Reaction(s): Not available    Sildenafil Other (See Comments) and Nausea And Vomiting     Flushing    Vardenafil Other (See Comments) and Nausea And Vomiting     Flushing       Social History     Tobacco Use   Smoking Status Former    Current packs/day: 1.00    Average

## 2025-07-15 ENCOUNTER — OFFICE VISIT (OUTPATIENT)
Dept: FAMILY MEDICINE CLINIC | Facility: CLINIC | Age: 65
End: 2025-07-15
Payer: MEDICARE

## 2025-07-15 VITALS
HEART RATE: 84 BPM | DIASTOLIC BLOOD PRESSURE: 70 MMHG | WEIGHT: 281 LBS | SYSTOLIC BLOOD PRESSURE: 130 MMHG | BODY MASS INDEX: 37.24 KG/M2 | OXYGEN SATURATION: 93 % | HEIGHT: 73 IN | TEMPERATURE: 98.6 F | RESPIRATION RATE: 20 BRPM

## 2025-07-15 DIAGNOSIS — I71.21 ANEURYSM OF THE ASCENDING AORTA, WITHOUT RUPTURE: ICD-10-CM

## 2025-07-15 DIAGNOSIS — E78.2 MIXED HYPERLIPIDEMIA: ICD-10-CM

## 2025-07-15 DIAGNOSIS — I10 ESSENTIAL HYPERTENSION: Primary | ICD-10-CM

## 2025-07-15 DIAGNOSIS — Z12.5 PROSTATE CANCER SCREENING: ICD-10-CM

## 2025-07-15 DIAGNOSIS — E66.01 SEVERE OBESITY (BMI 35.0-39.9) WITH COMORBIDITY (HCC): ICD-10-CM

## 2025-07-15 DIAGNOSIS — I25.10 CORONARY ARTERY DISEASE INVOLVING NATIVE CORONARY ARTERY OF NATIVE HEART WITHOUT ANGINA PECTORIS: ICD-10-CM

## 2025-07-15 DIAGNOSIS — E55.9 VITAMIN D DEFICIENCY: ICD-10-CM

## 2025-07-15 DIAGNOSIS — R73.03 PREDIABETES: ICD-10-CM

## 2025-07-15 DIAGNOSIS — L21.9 SEBORRHEIC DERMATITIS OF SCALP: ICD-10-CM

## 2025-07-15 DIAGNOSIS — J43.9 PULMONARY EMPHYSEMA, UNSPECIFIED EMPHYSEMA TYPE (HCC): ICD-10-CM

## 2025-07-15 DIAGNOSIS — L21.9 SEBORRHEIC DERMATITIS: ICD-10-CM

## 2025-07-15 LAB — HBA1C MFR BLD: 6.4 %

## 2025-07-15 PROCEDURE — 1160F RVW MEDS BY RX/DR IN RCRD: CPT | Performed by: FAMILY MEDICINE

## 2025-07-15 PROCEDURE — 1123F ACP DISCUSS/DSCN MKR DOCD: CPT | Performed by: FAMILY MEDICINE

## 2025-07-15 PROCEDURE — 1159F MED LIST DOCD IN RCRD: CPT | Performed by: FAMILY MEDICINE

## 2025-07-15 PROCEDURE — 83036 HEMOGLOBIN GLYCOSYLATED A1C: CPT | Performed by: FAMILY MEDICINE

## 2025-07-15 PROCEDURE — 1125F AMNT PAIN NOTED PAIN PRSNT: CPT | Performed by: FAMILY MEDICINE

## 2025-07-15 PROCEDURE — 3078F DIAST BP <80 MM HG: CPT | Performed by: FAMILY MEDICINE

## 2025-07-15 PROCEDURE — 99214 OFFICE O/P EST MOD 30 MIN: CPT | Performed by: FAMILY MEDICINE

## 2025-07-15 PROCEDURE — 3075F SYST BP GE 130 - 139MM HG: CPT | Performed by: FAMILY MEDICINE

## 2025-07-15 RX ORDER — CLOTRIMAZOLE AND BETAMETHASONE DIPROPIONATE 10; .64 MG/G; MG/G
CREAM TOPICAL
Qty: 45 G | Refills: 3 | Status: SHIPPED | OUTPATIENT
Start: 2025-07-15

## 2025-07-15 RX ORDER — KETOCONAZOLE 20 MG/ML
SHAMPOO, SUSPENSION TOPICAL
Qty: 240 ML | Refills: 5 | Status: SHIPPED | OUTPATIENT
Start: 2025-07-15

## 2025-07-15 RX ORDER — BUDESONIDE AND FORMOTEROL FUMARATE DIHYDRATE 160; 4.5 UG/1; UG/1
2 AEROSOL RESPIRATORY (INHALATION) EVERY 6 HOURS PRN
COMMUNITY
Start: 2025-06-13 | End: 2025-07-15 | Stop reason: ALTCHOICE

## 2025-07-15 RX ORDER — METFORMIN HYDROCHLORIDE 500 MG/1
1000 TABLET, EXTENDED RELEASE ORAL
Qty: 180 TABLET | Refills: 3 | Status: SHIPPED | OUTPATIENT
Start: 2025-07-15

## 2025-07-15 ASSESSMENT — PATIENT HEALTH QUESTIONNAIRE - PHQ9
1. LITTLE INTEREST OR PLEASURE IN DOING THINGS: NOT AT ALL
SUM OF ALL RESPONSES TO PHQ QUESTIONS 1-9: 0
2. FEELING DOWN, DEPRESSED OR HOPELESS: NOT AT ALL

## 2025-07-15 NOTE — PROGRESS NOTES
Terry Escamilla is a 64 y.o. male (: 1960) presenting to address:    Chief Complaint   Patient presents with    Blood Sugar Problem       Vitals:    07/15/25 0854   BP: 130/70   Pulse: 84   Resp: 20   Temp: 98.6 °F (37 °C)   SpO2: 93%       \"Have you been to the ER, urgent care clinic since your last visit?  Hospitalized since your last visit?\"    NO    “Have you seen or consulted any other health care providers outside of [facility] since your last visit?”    Pulmonary .

## 2025-07-15 NOTE — PATIENT INSTRUCTIONS
Current Status:  Hypertension adequately controlled  Prediabetes worsening with a hemoglobin A1c of 6.4% up from 6.2% 6 months ago despite starting metformin  mg daily  Satisfactory lipid levels as of annual check on 1/8/2025  Coronary artery disease followed by cardiology most recent evaluation October 2024  Ascending thoracic aortic aneurysm followed by cardiology  Pulmonary emphysema followed by pulmonary medicine, most recent evaluation June 2025  Obesity worsening with an additional 6 pound weight gain in the past 6 months  Satisfactory vitamin D level as of January 2025    Health Maintenance Recommendations:  Influenza immunization every fall  COVID-19 immunization booster and RSV immunization-available at the pharmacy  One-time ultrasound screening for abdominal aortic aneurysm  Colorectal cancer screening via Cologuard due July 2027  Low-dose chest CT for lung cancer screening followed by pulmonary medicine    Plan:  Continue current medications but increase metformin  from 1 to 2 tablets daily with food  Nizoral shampoo, used to shampoo scalp daily x 1 week then 2-3 times weekly as needed  Discontinue triamcinolone cream and replace with Lotrisone cream to be applied to affected areas twice daily  Avoid dietary salt, starch and sugar and as much as possible follow program of regular aerobic exercise.  Cardiology and pulmonary medicine follow-up as recommended by the consultants  Please schedule lab appointment followed by Medicare wellness evaluation appointment after 1/15/2026  Return sooner with any problems  Please always arrive at least 15 minutes before your scheduled appointment time.

## 2025-07-24 ENCOUNTER — OFFICE VISIT (OUTPATIENT)
Age: 65
End: 2025-07-24
Payer: MEDICARE

## 2025-07-24 VITALS
WEIGHT: 278 LBS | BODY MASS INDEX: 36.68 KG/M2 | HEART RATE: 77 BPM | OXYGEN SATURATION: 98 % | SYSTOLIC BLOOD PRESSURE: 138 MMHG | DIASTOLIC BLOOD PRESSURE: 68 MMHG

## 2025-07-24 DIAGNOSIS — I25.10 CORONARY ARTERY DISEASE DUE TO LIPID RICH PLAQUE: Primary | ICD-10-CM

## 2025-07-24 DIAGNOSIS — I25.83 CORONARY ARTERY DISEASE DUE TO LIPID RICH PLAQUE: Primary | ICD-10-CM

## 2025-07-24 DIAGNOSIS — I10 ESSENTIAL HYPERTENSION WITH GOAL BLOOD PRESSURE LESS THAN 140/90: ICD-10-CM

## 2025-07-24 PROCEDURE — 1126F AMNT PAIN NOTED NONE PRSNT: CPT | Performed by: INTERNAL MEDICINE

## 2025-07-24 PROCEDURE — 3078F DIAST BP <80 MM HG: CPT | Performed by: INTERNAL MEDICINE

## 2025-07-24 PROCEDURE — 1123F ACP DISCUSS/DSCN MKR DOCD: CPT | Performed by: INTERNAL MEDICINE

## 2025-07-24 PROCEDURE — 3075F SYST BP GE 130 - 139MM HG: CPT | Performed by: INTERNAL MEDICINE

## 2025-07-24 PROCEDURE — 99214 OFFICE O/P EST MOD 30 MIN: CPT | Performed by: INTERNAL MEDICINE

## 2025-07-24 PROCEDURE — 93000 ELECTROCARDIOGRAM COMPLETE: CPT | Performed by: INTERNAL MEDICINE

## 2025-07-24 RX ORDER — NITROGLYCERIN 0.4 MG/1
0.4 TABLET SUBLINGUAL EVERY 5 MIN PRN
Qty: 25 TABLET | Refills: 5 | Status: SHIPPED | OUTPATIENT
Start: 2025-07-24

## 2025-07-24 NOTE — PROGRESS NOTES
[facility]    Terry Escamilla is a 64 y.o. male, pmhx of CAD s/p LAD, Lcx/OM stents, AA, Mild LV dysfunction, HTN, HLD, COPD and obesity     Patient has known history of CAD s/p ostial/proximal LAD 4.0 x 18 mm PAM in 04/2016 along with proximal LCx/OM 4.0 x 15 mm PAM in 04/2016  Mitral regurgitation which was moderate by echo in 2016   Patient with aortic aneurysm with dilation up to 4.2 cm by CAT scan in 2016  NST in 02/2024, low risk without any evidence of ischemia or infarct.  Echo in 02/2024 with normal EF, mild MR    Patient with mild dyspnea on moderate exertion, unchanged.  Have not used any nitroglycerin.  Taking medication as prescribed  No hospital admission or ER visits since last time  Denies diaphoresis, N/V/D, weight gain, LE edema, orthopnea, PND, palpitations, near syncope or syncope, dizziness, melena, BRBPR.     Past Medical History:   Diagnosis Date    Aorta aneurysm     Coronary artery disease     LAD - 4.0 x 18mm XIENCE 4/16, OM1 - 4.0 x 15mm XIENCE 4/16     Dyslipidemia     Emphysema of lung (HCC)     Granuloma annulare     Hypertension     Hypertensive cardiovascular disease     Mitral regurgitation     moderate (TTE 02/17),  moderate (MARIBEL 4/16)    Obesity     Pulmonary fibrosis (HCC)     upper lobes bilat (CTA 12/16)    Seborrheic dermatitis     Tobacco abuse, in remission     quit 2012     Past Surgical History:   Procedure Laterality Date    CERVICAL FUSION      cervical fusion    ORTHOPEDIC SURGERY      prior (L) knee surgery     Current Outpatient Medications   Medication Sig    metFORMIN (GLUCOPHAGE-XR) 500 MG extended release tablet Take 2 tablets by mouth daily (with breakfast)    ketoconazole (NIZORAL) 2 % shampoo Shampoo scalp daily x 1 week, then 2-3 times weekly as needed    clotrimazole-betamethasone (LOTRISONE) 1-0.05 % cream Apply topically 2 times daily.    BUDESONIDE RE Place rectally    atorvastatin (LIPITOR) 80 MG tablet TAKE 1 TABLET BY MOUTH DAILY    ezetimibe

## 2025-07-24 NOTE — PROGRESS NOTES
Identified pt with two pt identifiers(name and ). Reviewed record in preparation for visit and have obtained necessary documentation.    Terry Escamilla presents today for   Chief Complaint   Patient presents with    Follow-up     9mo f/u       Pt c/o LYNN             Terry Escamilla preferred language for health care discussion is english/other.    Personal Protective Equipment:   Personal Protective Equipment was used including: mask-surgical and hands-gloves. Patient was placed on no precaution(s). Patient was not masked.    Precautions:   Patient currently on None  Patient currently roomed with door closed.    Is someone accompanying this pt? no    Is the patient using any DME equipment during OV? no    Depression Screenin/15/2025     8:58 AM 2025    12:41 PM 2024     8:49 AM 2/3/2023     1:49 PM   PHQ-9 Questionaire   Little interest or pleasure in doing things 0 0 0 0   Feeling down, depressed, or hopeless 0 0 0 0   PHQ-9 Total Score 0 0  0 0       Patient-reported        Learning Assessment:  Completed    Abuse Screenin/24/2025    10:00 AM 10/22/2024    10:00 AM 2024    11:00 AM   AMB Abuse Screening   Do you ever feel afraid of your partner? N N N   Are you in a relationship with someone who physically or mentally threatens you? N N N   Is it safe for you to go home? Y Y Y        Fall Risk      2025    10:13 AM 2025    12:41 PM 10/22/2024     9:59 AM 2024    11:05 AM 2024     8:49 AM   Fall Risk   Do you feel unsteady or are you worried about falling?  no no no no yes   2 or more falls in past year? no no no no no   Fall with injury in past year? no no no no no         Pt currently taking Anticoagulant /Antiplatelet therapy? Asprin 325 mg tab     Coordination of Care:  1. Have you been to the ER, urgent care clinic since your last visit? Hospitalized since your last visit? no    2. Have you seen or consulted any other health care providers outside of the [facility]

## 2025-07-30 ENCOUNTER — DIAGNOSTICS ONLY (OUTPATIENT)
Age: 65
End: 2025-07-30

## 2025-07-30 DIAGNOSIS — Z96.1: ICD-10-CM

## 2025-07-30 PROCEDURE — 92015 DETERMINE REFRACTIVE STATE: CPT | Mod: NC

## 2025-08-22 RX ORDER — METOPROLOL SUCCINATE 25 MG/1
25 TABLET, EXTENDED RELEASE ORAL DAILY
Qty: 90 TABLET | Refills: 3 | Status: SHIPPED | OUTPATIENT
Start: 2025-08-22